# Patient Record
Sex: FEMALE | Race: OTHER
[De-identification: names, ages, dates, MRNs, and addresses within clinical notes are randomized per-mention and may not be internally consistent; named-entity substitution may affect disease eponyms.]

---

## 2017-03-10 ENCOUNTER — HOSPITAL ENCOUNTER (OUTPATIENT)
Dept: HOSPITAL 53 - M LAB | Age: 52
End: 2017-03-10
Attending: PHYSICIAN ASSISTANT
Payer: MEDICAID

## 2017-03-10 DIAGNOSIS — E11.9: Primary | ICD-10-CM

## 2017-03-10 DIAGNOSIS — J44.9: ICD-10-CM

## 2017-03-10 LAB
ALBUMIN SERPL BCG-MCNC: 3.6 GM/DL (ref 3.2–5.2)
ALBUMIN/GLOB SERPL: 1.03 {RATIO} (ref 1–1.93)
ALP SERPL-CCNC: 97 U/L (ref 45–117)
ALT SERPL W P-5'-P-CCNC: 24 U/L (ref 12–78)
ANION GAP SERPL CALC-SCNC: 8 MEQ/L (ref 8–16)
AST SERPL-CCNC: 14 U/L (ref 15–37)
BASOPHILS # BLD AUTO: 0.1 K/MM3 (ref 0–0.2)
BASOPHILS NFR BLD AUTO: 0.8 % (ref 0–1)
BILIRUB SERPL-MCNC: 0.3 MG/DL (ref 0.2–1)
BUN SERPL-MCNC: 10 MG/DL (ref 7–18)
CALCIUM SERPL-MCNC: 9.1 MG/DL (ref 8.5–10.1)
CHLORIDE SERPL-SCNC: 102 MEQ/L (ref 98–107)
CHOLEST SERPL-MCNC: 184 MG/DL (ref ?–200)
CO2 SERPL-SCNC: 31 MEQ/L (ref 21–32)
CREAT SERPL-MCNC: 0.8 MG/DL (ref 0.55–1.02)
EOSINOPHIL # BLD AUTO: 0.2 K/MM3 (ref 0–0.5)
EOSINOPHIL NFR BLD AUTO: 2.3 % (ref 0–3)
ERYTHROCYTE [DISTWIDTH] IN BLOOD BY AUTOMATED COUNT: 13.8 % (ref 11.5–14.5)
EST. AVERAGE GLUCOSE BLD GHB EST-MCNC: 169 MG/DL (ref 60–110)
GFR SERPL CREATININE-BSD FRML MDRD: > 60 ML/MIN/{1.73_M2} (ref 51–?)
GLUCOSE SERPL-MCNC: 175 MG/DL (ref 70–105)
LYMPHOCYTES # BLD AUTO: 1.7 K/MM3 (ref 1.5–4.5)
LYMPHOCYTES NFR BLD AUTO: 20 % (ref 24–44)
MCH RBC QN AUTO: 29.9 PG (ref 27–33)
MCHC RBC AUTO-ENTMCNC: 34.1 G/DL (ref 32–36.5)
MCV RBC AUTO: 87.5 FL (ref 80–96)
MONOCYTES # BLD AUTO: 0.3 K/MM3 (ref 0–0.8)
MONOCYTES NFR BLD AUTO: 3.6 % (ref 0–5)
NEUTROPHILS # BLD AUTO: 6.1 K/MM3 (ref 1.8–7.7)
NEUTROPHILS NFR BLD AUTO: 71.7 % (ref 36–66)
PLATELET # BLD AUTO: 276 K/MM3 (ref 150–450)
POTASSIUM SERPL-SCNC: 4.6 MEQ/L (ref 3.5–5.1)
PROT SERPL-MCNC: 7.1 GM/DL (ref 6.4–8.2)
SODIUM SERPL-SCNC: 141 MEQ/L (ref 136–145)
TRIGL SERPL-MCNC: 161 MG/DL (ref ?–150)
WBC # BLD AUTO: 8.6 K/MM3 (ref 4–10)

## 2017-03-10 NOTE — REP
Chest two views

 

HISTORY: COPD

 

Comparison: 01/28/2016

 

The lungs are hyperinflated.  The lungs are clear.  The cardiac silhouette is

enlarged.  The pulmonary vasculature is normal in appearance.  The bony structure

is intact.

 

IMPRESSION: Cardiomegaly.

 

 

Signed by

Ravi Greenwood MD 03/10/2017 09:57 A

## 2017-03-24 ENCOUNTER — HOSPITAL ENCOUNTER (OUTPATIENT)
Dept: HOSPITAL 53 - M RAD | Age: 52
End: 2017-03-24
Attending: PHYSICIAN ASSISTANT
Payer: MEDICAID

## 2017-03-24 DIAGNOSIS — Z12.31: Primary | ICD-10-CM

## 2017-03-24 NOTE — REPMRS
Patient History

The patient states she has not had a clinical breast exam in over

a year.

Patient is postmenopausal.

No known family history of cancer.

 

Digital Mammo Screening Bilat: March 24, 2017 - Exam #: 

[id number]

Bilateral CC and MLO view(s) were taken.

 

Technologist: Kerri Ramsey Technologist

Prior study comparison: January 28, 2016, digital woman screen 

mammo, performed at [facility].

 

FINDINGS: There are scattered fibroglandular densities.  There 

has been no change in the appearance of the mammogram from the 

prior studies.  There is a mild amount of residual fibroglandular

tissue which is fairly symmetric. There is no interval 

development of dominant mass, architectural distortion, or 

clustered microcalcification suggestive of malignancy.

 

ASSESSMENT: BI-RADS/ACR category 1 mammogram. Negative.

 

Recommendation

Routine screening mammogram in 1 year (for women over age 40).

This mammogram was interpreted with the aid of an FDA-approved 

computer-aided dectection system.

 

Electronically Signed By: Kahlil Black MD 03/24/17 [id number]

## 2017-08-23 ENCOUNTER — HOSPITAL ENCOUNTER (OUTPATIENT)
Dept: HOSPITAL 53 - M SLEEP | Age: 52
End: 2017-08-23
Attending: INTERNAL MEDICINE
Payer: COMMERCIAL

## 2017-08-23 DIAGNOSIS — G47.33: Primary | ICD-10-CM

## 2017-08-23 DIAGNOSIS — G47.61: ICD-10-CM

## 2017-08-26 NOTE — SLEEPCENT
DATE OF PROCEDURE:  08/23/2017

 

ORDERED BY:  Dr. Merino.

 

INTERPRETATION:  Nocturnal polysomnography was performed due to concern for the

obstructive sleep apnea syndrome in this patient with a history of nonrestorative

sleep and excessive somnolence.

 

8 hours and 5 minutes of data were reviewed. There were 328 minutes of sleep

identified.  Sleep latency was prolonged at 24 minutes.  REM latency was

prolonged at 365 minutes.  Sleep architecture was poor with significant

fragmentation and REM delay.  Overall sleep efficiency is 68.9%.  The patient's

EKG showed a sinus rhythm with an average heart rate of 82 beats per minute.

Rate variability was seen surrounding respiratory events.  Rate ranged .

EEG showed coarsening in background.  No focal events were seen.  There were 49

respiratory events identified of 10 seconds in duration or greater for an

apnea-hypopnea index of 9.  The events were primarily obstructive more frequent

in the supine posture not exclusive to sleep stage.  Arousal from respiratory

events occurred 7.9 times per hour.  Oxygen desaturations were seen into the low

80s.  There was also significant limb activity with four to five trains of 30

events.  Limb movement arousal index 13.2.

 

IMPRESSION:

1.  Obstructive sleep apnea syndrome (G47.31).  Apnea-hypopnea index of 9.

2.  Periodic limb movement disorder (G47.61).  Limb movement arousal index 13.2.

 

 

RECOMMENDATION:  The patient should be encouraged to return to the sleep disorder

center for pressure therapy.  In the interim, alcohol and sedative avoidance

should be practiced and caution exercised during the operation of motor vehicles.

Pending response to pressure therapy, interventions to reduce the frequency or

arousal from limb activity may also be helpful.

 

 

cc:    BEAU Rivera

## 2017-09-13 ENCOUNTER — HOSPITAL ENCOUNTER (OUTPATIENT)
Dept: HOSPITAL 53 - M SLEEP | Age: 52
End: 2017-09-13
Attending: INTERNAL MEDICINE
Payer: COMMERCIAL

## 2017-09-13 DIAGNOSIS — G47.33: Primary | ICD-10-CM

## 2017-09-14 NOTE — SLEEPCENT
DATE OF PROCEDURE:   09/13/2017

 

REQUESTING PROVIDER:  Ailin Ayers NP

 

INTERPRETATION: Nocturnal polysomnography was performed for titration of pressure

therapy in this patient with obstructive sleep apnea syndrome, apnea-hypopnea

index of 9.

 

For testing, a Resmed Quattro full face mask of medium size was used, 4 cm of

water pressure were applied to the circuit and the lights were extinguished.

 

6 hours and 45 minutes of data were reviewed.  There were 367 minutes of sleep

identified.  Sleep latency was short at 3.5 minutes.  Rapid eye movement (REM)

latency was short at 51 minutes. Sleep architecture was fairly good with two long

REM periods appreciated.  There was mild fragmentation at the end of the study.

Overall sleep efficiency was 91.8%.  The patient's EKG showed a sinus rhythm with

an average heart rate of 78 beats per minute.  Occasional unifocal ventricular

ectopic beats were seen.  EEG showed normal waveforms for awake and sleep.

Respiratory events were fully palliated with a CPAP to a pressure of +10.

Hypoventilatory oxygen desaturation prompted the addition of supplemental oxygen.

Best sleep was seen on CPAP +10 with 2 liters of oxygen bled through the system.

Remaining measures of sleep physiology were reasonably normal.

 

IMPRESSION:

1.  Obstructive sleep apnea syndrome (G47.33).

 

RECOMMENDATIONS:   Nightly use of pressure therapy at 10 cm of water with 2

liters of oxygen bled through the system to address hypoventilatory oxygen

desaturations.

## 2018-01-18 ENCOUNTER — HOSPITAL ENCOUNTER (OUTPATIENT)
Dept: HOSPITAL 53 - M LAB | Age: 53
End: 2018-01-18
Attending: NURSE PRACTITIONER
Payer: COMMERCIAL

## 2018-01-18 ENCOUNTER — HOSPITAL ENCOUNTER (OUTPATIENT)
Dept: HOSPITAL 53 - M LAB | Age: 53
End: 2018-01-18
Attending: PHYSICIAN ASSISTANT
Payer: COMMERCIAL

## 2018-01-18 DIAGNOSIS — Z79.899: ICD-10-CM

## 2018-01-18 DIAGNOSIS — Z51.81: Primary | ICD-10-CM

## 2018-01-18 DIAGNOSIS — E11.9: Primary | ICD-10-CM

## 2018-01-18 LAB
25(OH)D3 SERPL-MCNC: 35.8 NG/ML (ref 30–100)
ALBUMIN/GLOBULIN RATIO: 1.03 (ref 1–1.93)
ALBUMIN: 3.6 GM/DL (ref 3.2–5.2)
ALKALINE PHOSPHATASE: 104 U/L (ref 45–117)
ALT SERPL W P-5'-P-CCNC: 22 U/L (ref 12–78)
ANION GAP: 6 MEQ/L (ref 8–16)
AST SERPL-CCNC: 12 U/L (ref 7–37)
BASO #: 0.1 10^3/UL (ref 0–0.2)
BASO %: 1 % (ref 0–1)
BILIRUBIN,TOTAL: 0.5 MG/DL (ref 0.2–1)
BLOOD UREA NITROGEN: 10 MG/DL (ref 7–18)
CALCIUM LEVEL: 9.3 MG/DL (ref 8.5–10.1)
CARBON DIOXIDE LEVEL: 32 MEQ/L (ref 21–32)
CHLORIDE LEVEL: 99 MEQ/L (ref 98–107)
CHOLEST SERPL-MCNC: 159 MG/DL (ref ?–200)
CHOLEST SERPL-MCNC: 162 MG/DL (ref ?–200)
CHOLESTEROL RISK RATIO: 2.79 (ref ?–5)
CHOLESTEROL RISK RATIO: 2.79 (ref ?–5)
CREATININE FOR GFR: 0.7 MG/DL (ref 0.55–1.02)
EOS #: 0.2 10^3/UL (ref 0–0.5)
EOSINOPHIL NFR BLD AUTO: 1.6 % (ref 0–3)
EST. AVERAGE GLUCOSE BLD GHB EST-MCNC: 180 MG/DL (ref 60–110)
EST. AVERAGE GLUCOSE BLD GHB EST-MCNC: 192 MG/DL (ref 60–110)
GFR SERPL CREATININE-BSD FRML MDRD: > 60 ML/MIN/{1.73_M2} (ref 51–?)
GLUCOSE, FASTING: 198 MG/DL (ref 70–105)
GLUCOSE,RANDOM: 198 MG/DL (ref ?–200)
HDLC SERPL-MCNC: 57 MG/DL (ref 40–?)
HDLC SERPL-MCNC: 58 MG/DL (ref 40–?)
HEMATOCRIT: 52.2 % (ref 36–47)
HEMOGLOBIN: 18 G/DL (ref 12–16)
IMMATURE GRANULOCYTE #: 0.1 10^3/UL (ref 0–0)
IMMATURE GRANULOCYTE %: 0.6 % (ref 0–0)
LDL CHOLESTEROL: 78.2 MG/DL (ref ?–100)
LDL CHOLESTEROL: 79.8 MG/DL (ref ?–100)
LYMPH #: 1.8 10^3/UL (ref 1.5–4.5)
LYMPH %: 16.4 % (ref 24–44)
MEAN CORPUSCULAR HEMOGLOBIN: 29.7 PG (ref 27–33)
MEAN CORPUSCULAR HGB CONC: 34.5 G/DL (ref 32–36.5)
MEAN CORPUSCULAR VOLUME: 86.1 FL (ref 80–96)
MONO #: 0.6 10^3/UL (ref 0–0.8)
MONO %: 5.1 % (ref 0–5)
NEUTROPHILS #: 8.2 10^3/UL (ref 1.8–7.7)
NEUTROPHILS %: 75.3 % (ref 36–66)
NONHDLC SERPL-MCNC: 102 MG/DL
NONHDLC SERPL-MCNC: 104 MG/DL
NRBC BLD AUTO-RTO: 0 % (ref 0–0)
PLATELET COUNT, AUTOMATED: 269 10^3/UL (ref 150–450)
POTASSIUM SERUM: 4.7 MEQ/L (ref 3.5–5.1)
RED BLOOD COUNT: 6.06 10^6/UL (ref 4–5.4)
RED CELL DISTRIBUTION WIDTH: 13.7 % (ref 11.5–14.5)
SODIUM LEVEL: 137 MEQ/L (ref 136–145)
THYROID STIMULATING HORMONE: 2.23 UIU/ML (ref 0.36–3.74)
TOTAL PROTEIN: 7.1 GM/DL (ref 6.4–8.2)
TRIGLYCERIDES LEVEL: 119 MG/DL (ref ?–150)
TRIGLYCERIDES LEVEL: 121 MG/DL (ref ?–150)
WHITE BLOOD COUNT: 10.8 10^3/UL (ref 4–10)

## 2018-01-18 PROCEDURE — 82947 ASSAY GLUCOSE BLOOD QUANT: CPT

## 2018-01-18 PROCEDURE — 36415 COLL VENOUS BLD VENIPUNCTURE: CPT

## 2019-08-18 ENCOUNTER — HOSPITAL ENCOUNTER (EMERGENCY)
Dept: HOSPITAL 53 - M ED | Age: 54
Discharge: ANOTHER HOSPITAL | End: 2019-08-18
Payer: COMMERCIAL

## 2019-08-18 VITALS — BODY MASS INDEX: 50.02 KG/M2 | WEIGHT: 293 LBS | HEIGHT: 64 IN

## 2019-08-18 VITALS — DIASTOLIC BLOOD PRESSURE: 79 MMHG | SYSTOLIC BLOOD PRESSURE: 174 MMHG

## 2019-08-18 DIAGNOSIS — Z79.4: ICD-10-CM

## 2019-08-18 DIAGNOSIS — Z79.82: ICD-10-CM

## 2019-08-18 DIAGNOSIS — F20.9: ICD-10-CM

## 2019-08-18 DIAGNOSIS — E11.9: ICD-10-CM

## 2019-08-18 DIAGNOSIS — Z79.899: ICD-10-CM

## 2019-08-18 DIAGNOSIS — I63.9: Primary | ICD-10-CM

## 2019-08-18 DIAGNOSIS — I10: ICD-10-CM

## 2019-08-18 DIAGNOSIS — Z79.02: ICD-10-CM

## 2019-08-18 DIAGNOSIS — F17.210: ICD-10-CM

## 2019-08-18 DIAGNOSIS — F41.9: ICD-10-CM

## 2019-08-18 LAB
APTT BLD: 28.8 SECONDS (ref 25–38.4)
BASOPHILS # BLD AUTO: 0.1 10^3/UL (ref 0–0.2)
BASOPHILS NFR BLD AUTO: 0.9 % (ref 0–1)
CK MB CFR.DF SERPL CALC: 2.27
CK MB SERPL-MCNC: < 1 NG/ML (ref ?–3.6)
CK SERPL-CCNC: 44 U/L (ref 26–192)
EOSINOPHIL # BLD AUTO: 0.2 10^3/UL (ref 0–0.5)
EOSINOPHIL NFR BLD AUTO: 1.8 % (ref 0–3)
HCT VFR BLD AUTO: 46.9 % (ref 36–47)
HGB BLD-MCNC: 16.1 G/DL (ref 12–15.5)
INR PPP: 0.98
LYMPHOCYTES # BLD AUTO: 2.1 10^3/UL (ref 1.5–4.5)
LYMPHOCYTES NFR BLD AUTO: 21.1 % (ref 24–44)
MCH RBC QN AUTO: 30.3 PG (ref 27–33)
MCHC RBC AUTO-ENTMCNC: 34.3 G/DL (ref 32–36.5)
MCV RBC AUTO: 88.3 FL (ref 80–96)
MONOCYTES # BLD AUTO: 0.5 10^3/UL (ref 0–0.8)
MONOCYTES NFR BLD AUTO: 5.4 % (ref 0–5)
NEUTROPHILS # BLD AUTO: 6.8 10^3/UL (ref 1.8–7.7)
NEUTROPHILS NFR BLD AUTO: 70.3 % (ref 36–66)
PLATELET # BLD AUTO: 277 10^3/UL (ref 150–450)
PROTHROMBIN TIME: 12.7 SECONDS (ref 11.8–14)
RBC # BLD AUTO: 5.31 10^6/UL (ref 4–5.4)
TROPONIN I SERPL-MCNC: < 0.02 NG/ML (ref ?–0.1)
WBC # BLD AUTO: 9.7 10^3/UL (ref 4–10)

## 2019-08-18 NOTE — REPVR
EXAM: 

CT Head Without Contrast 



EXAM DATE/TIME: 

8/18/2019 9:44 PM 



CLINICAL HISTORY: 

54 years old, female; Weakness, extremity; Left; Additional info: Left-sided 

numbness 



TECHNIQUE: 

Imaging protocol: Computed tomography images of the head without contrast. 

Radiation optimization: All CT scans at this facility use at least one of these 

dose optimization techniques: automated exposure control; mA and/or kV 

adjustment per patient size (includes targeted exams where dose is matched to 

clinical indication); or iterative reconstruction. 

Other technique: STROKE PROTOCOL was implemented. 



COMPARISON: 

No relevant prior studies available. 



FINDINGS: 

Brain: There are multiple small round areas of low density in the 

periventricular white matter especially along the lateral ventricles greater on 

the right and consistent with chronic ischemic changes. 

Ventricles: Normal. No ventriculomegaly. 

Bones/joints: There is no evidence of fracture. 

Sinuses: Clear paranasal sinuses. 

Mastoid air cells: Clear mastoid air cells there is no evidence of intracranial 

bleed. 

Soft tissues: Unremarkable. 

Other findings: There is no evidence of acute stroke, however, a stroke of less 

than 24 hours might not be seen on CT. MRI is more sensitive for very early 

stroke. 



IMPRESSION: 

1. Small areas of old ischemic change in white matter along the ventricles. 

2. No evidence of acute stroke, however, MRI is more sensitive for this. 



ASSESSMENT: 

ASPECTS (Alberta Stroke Program Early CT Score) is 10.



Electronically signed by: Kvng Bentley On 08/18/2019  22:06:20 PM

## 2019-08-19 NOTE — REP
Portable chest, 10:56 p.m., single AP view with the patient semi upright:

Comparisons are the PA and lateral chest dated 01/28/2016 and CT of the abdomen

day 05/23/2009.

 

There is focal increased density inferomedially in the right lung, unchanged.  On

the comparison CT there is a large epicardial fat pad likely accounting for this

density.

Lung fields otherwise clear.

Cardiac size is mildly enlarged.  The sonya, mediastinum, and skeletal structures

are unremarkable.

Mild cardiomegaly.  Increased density inferomedially on the right, likely

artifact from a large epicardial fat pad.  The

 

 

Electronically Signed by

Kahlil Vargas MD 08/19/2019 07:11 A

## 2019-08-20 NOTE — ECGEPIP
[facility] - ED

                                       

                                       Test Date:    2019

Pat Name:     JOCELYN EPPS           Department:   

Patient ID:   [id number]                 Room:         -

Gender:       Female                   Technician:   AK

:          1965               Requested By: FRANNIE GOEL

Order Number: [id number]     Reading MD:   Viki Thompson

                                 Measurements

Intervals                              Axis          

Rate:         78                       P:            58

NM:           170                      QRS:          40

QRSD:         90                       T:            75

QT:           383                                    

QTc:          437                                    

                           Interpretive Statements

SINUS RHYTHM WITH OCCASIONAL SUPRAVENTRICULAR PREMATURE COMPLEXES

NSTTW abnormalities

NO PRIOR

Electronically Signed on 2019 10:12:13 EDT by Viki Thompson

## 2019-08-22 ENCOUNTER — HOSPITAL ENCOUNTER (INPATIENT)
Dept: HOSPITAL 53 - M PM&R | Age: 54
LOS: 34 days | Discharge: HOME HEALTH | DRG: 58 | End: 2019-09-25
Attending: PHYSICAL MEDICINE & REHABILITATION | Admitting: PHYSICAL MEDICINE & REHABILITATION
Payer: COMMERCIAL

## 2019-08-22 VITALS — DIASTOLIC BLOOD PRESSURE: 73 MMHG | SYSTOLIC BLOOD PRESSURE: 158 MMHG

## 2019-08-22 VITALS — HEIGHT: 62 IN | WEIGHT: 287.92 LBS | BODY MASS INDEX: 52.98 KG/M2

## 2019-08-22 DIAGNOSIS — Z87.891: ICD-10-CM

## 2019-08-22 DIAGNOSIS — Z90.49: ICD-10-CM

## 2019-08-22 DIAGNOSIS — E11.9: ICD-10-CM

## 2019-08-22 DIAGNOSIS — I10: ICD-10-CM

## 2019-08-22 DIAGNOSIS — G47.33: ICD-10-CM

## 2019-08-22 DIAGNOSIS — F17.200: ICD-10-CM

## 2019-08-22 DIAGNOSIS — E78.5: ICD-10-CM

## 2019-08-22 DIAGNOSIS — E66.01: ICD-10-CM

## 2019-08-22 DIAGNOSIS — I69.392: ICD-10-CM

## 2019-08-22 DIAGNOSIS — I69.354: ICD-10-CM

## 2019-08-22 DIAGNOSIS — R13.10: ICD-10-CM

## 2019-08-22 DIAGNOSIS — Z79.4: ICD-10-CM

## 2019-08-22 DIAGNOSIS — F20.9: ICD-10-CM

## 2019-08-22 DIAGNOSIS — I69.398: ICD-10-CM

## 2019-08-22 DIAGNOSIS — N39.0: ICD-10-CM

## 2019-08-22 DIAGNOSIS — Z79.899: ICD-10-CM

## 2019-08-22 DIAGNOSIS — Z79.82: ICD-10-CM

## 2019-08-22 DIAGNOSIS — I69.391: Primary | ICD-10-CM

## 2019-08-22 DIAGNOSIS — R26.89: ICD-10-CM

## 2019-08-22 RX ADMIN — SENNOSIDES SCH TAB: 8.6 TABLET, FILM COATED ORAL at 22:11

## 2019-08-22 RX ADMIN — DOCUSATE SODIUM SCH MG: 100 CAPSULE, LIQUID FILLED ORAL at 22:11

## 2019-08-22 RX ADMIN — INSULIN LISPRO SCH UNITS: 100 INJECTION, SOLUTION INTRAVENOUS; SUBCUTANEOUS at 21:00

## 2019-08-22 RX ADMIN — LATANOPROST SCH DROP: 50 SOLUTION OPHTHALMIC at 21:00

## 2019-08-22 RX ADMIN — FLUTICASONE PROPIONATE SCH SPRAY: 50 SPRAY, METERED NASAL at 21:00

## 2019-08-23 VITALS — SYSTOLIC BLOOD PRESSURE: 125 MMHG | DIASTOLIC BLOOD PRESSURE: 68 MMHG

## 2019-08-23 VITALS — SYSTOLIC BLOOD PRESSURE: 123 MMHG | DIASTOLIC BLOOD PRESSURE: 72 MMHG

## 2019-08-23 VITALS — SYSTOLIC BLOOD PRESSURE: 126 MMHG | DIASTOLIC BLOOD PRESSURE: 71 MMHG

## 2019-08-23 LAB
ALBUMIN SERPL BCG-MCNC: 3.3 GM/DL (ref 3.2–5.2)
ALT SERPL W P-5'-P-CCNC: 23 U/L (ref 12–78)
BASOPHILS # BLD AUTO: 0.1 10^3/UL (ref 0–0.2)
BASOPHILS NFR BLD AUTO: 0.8 % (ref 0–1)
BILIRUB SERPL-MCNC: 0.8 MG/DL (ref 0.2–1)
BUN SERPL-MCNC: 18 MG/DL (ref 7–18)
CALCIUM SERPL-MCNC: 9.8 MG/DL (ref 8.5–10.1)
CHLORIDE SERPL-SCNC: 101 MEQ/L (ref 98–107)
CO2 SERPL-SCNC: 31 MEQ/L (ref 21–32)
CREAT SERPL-MCNC: 0.81 MG/DL (ref 0.55–1.3)
EOSINOPHIL # BLD AUTO: 0.1 10^3/UL (ref 0–0.5)
EOSINOPHIL NFR BLD AUTO: 1.6 % (ref 0–3)
GFR SERPL CREATININE-BSD FRML MDRD: > 60 ML/MIN/{1.73_M2} (ref 51–?)
GLUCOSE SERPL-MCNC: 145 MG/DL (ref 70–100)
HCT VFR BLD AUTO: 49.6 % (ref 36–47)
HGB BLD-MCNC: 16.7 G/DL (ref 12–15.5)
LYMPHOCYTES # BLD AUTO: 1.6 10^3/UL (ref 1.5–4.5)
LYMPHOCYTES NFR BLD AUTO: 18.2 % (ref 24–44)
MCH RBC QN AUTO: 28.9 PG (ref 27–33)
MCHC RBC AUTO-ENTMCNC: 33.7 G/DL (ref 32–36.5)
MCV RBC AUTO: 85.8 FL (ref 80–96)
MONOCYTES # BLD AUTO: 0.7 10^3/UL (ref 0–0.8)
MONOCYTES NFR BLD AUTO: 7.7 % (ref 0–5)
NEUTROPHILS # BLD AUTO: 6.2 10^3/UL (ref 1.8–7.7)
NEUTROPHILS NFR BLD AUTO: 71.2 % (ref 36–66)
PLATELET # BLD AUTO: 261 10^3/UL (ref 150–450)
POTASSIUM SERPL-SCNC: 3.6 MEQ/L (ref 3.5–5.1)
PROT SERPL-MCNC: 7.4 GM/DL (ref 6.4–8.2)
RBC # BLD AUTO: 5.78 10^6/UL (ref 4–5.4)
SODIUM SERPL-SCNC: 137 MEQ/L (ref 136–145)
WBC # BLD AUTO: 8.7 10^3/UL (ref 4–10)

## 2019-08-23 RX ADMIN — FLUTICASONE PROPIONATE SCH SPRAY: 50 SPRAY, METERED NASAL at 22:43

## 2019-08-23 RX ADMIN — CLOPIDOGREL BISULFATE SCH MG: 75 TABLET ORAL at 08:50

## 2019-08-23 RX ADMIN — ASPIRIN 81 MG CHEWABLE TABLET SCH MG: 81 TABLET CHEWABLE at 08:50

## 2019-08-23 RX ADMIN — SENNOSIDES SCH TAB: 8.6 TABLET, FILM COATED ORAL at 22:41

## 2019-08-23 RX ADMIN — INSULIN LISPRO SCH UNITS: 100 INJECTION, SOLUTION INTRAVENOUS; SUBCUTANEOUS at 22:42

## 2019-08-23 RX ADMIN — LATANOPROST SCH DROP: 50 SOLUTION OPHTHALMIC at 22:42

## 2019-08-23 RX ADMIN — ACETAMINOPHEN PRN MG: 325 TABLET ORAL at 02:53

## 2019-08-23 RX ADMIN — ENOXAPARIN SODIUM SCH MG: 40 INJECTION SUBCUTANEOUS at 08:49

## 2019-08-23 RX ADMIN — LISINOPRIL SCH MG: 10 TABLET ORAL at 22:42

## 2019-08-23 RX ADMIN — IPRATROPIUM BROMIDE AND ALBUTEROL SULFATE SCH ML: .5; 3 SOLUTION RESPIRATORY (INHALATION) at 19:37

## 2019-08-23 RX ADMIN — PANTOPRAZOLE SODIUM SCH MG: 40 TABLET, DELAYED RELEASE ORAL at 08:50

## 2019-08-23 RX ADMIN — DOCUSATE SODIUM SCH MG: 100 CAPSULE, LIQUID FILLED ORAL at 22:41

## 2019-08-23 RX ADMIN — ATORVASTATIN CALCIUM SCH MG: 20 TABLET, FILM COATED ORAL at 08:50

## 2019-08-23 RX ADMIN — FLUTICASONE PROPIONATE SCH SPRAY: 50 SPRAY, METERED NASAL at 08:50

## 2019-08-23 RX ADMIN — DOCUSATE SODIUM SCH MG: 100 CAPSULE, LIQUID FILLED ORAL at 08:50

## 2019-08-23 RX ADMIN — IPRATROPIUM BROMIDE AND ALBUTEROL SULFATE SCH ML: .5; 3 SOLUTION RESPIRATORY (INHALATION) at 12:00

## 2019-08-23 RX ADMIN — INSULIN LISPRO SCH UNITS: 100 INJECTION, SOLUTION INTRAVENOUS; SUBCUTANEOUS at 12:30

## 2019-08-23 RX ADMIN — INSULIN LISPRO SCH UNITS: 100 INJECTION, SOLUTION INTRAVENOUS; SUBCUTANEOUS at 17:31

## 2019-08-23 RX ADMIN — BUPROPION HYDROCHLORIDE SCH MG: 150 TABLET, FILM COATED, EXTENDED RELEASE ORAL at 08:50

## 2019-08-23 RX ADMIN — INSULIN LISPRO SCH UNITS: 100 INJECTION, SOLUTION INTRAVENOUS; SUBCUTANEOUS at 08:49

## 2019-08-23 NOTE — HPEPDOC
Physiatrist Note


DATE OF ADMISSION: 8/22/19





SOURCE OF ADMISSION INFORMATION: [facility] records and patient





CHIEF COMPLAINT: stroke





HISTORY OF PRESENT ILLNESS: 





54F pm DM, HTN, HLD, schizophrenia,  morbid obesity, smoker, GIULIANO, recent CVA 

with unclear residual deficits, who developed left sided facial droop and left 

upper extremity weakness initially presented to [facility] ED and transferred to [facility] where she was admitted to the stroke service on 8/19/19. MRI on 8/20/19 

showed, Acute infarcts bilateral corona radiata and basal ganglia. 2. Right 

retinal detachment.  CTA head and neck showed calcified plaques in both ICA 

without significant stenosis. ECHO was negative for a LA appendage thrombus. She

was started on dual antiplatelet therapy, evaluated by therapy and noted have 

severe impairments in mobility and ADL management in addition to dysphagia. She 

was deemed medically appropriate for discharge to ARU on 8/22/19. 








REVIEW OF SYSTEMS: The following is a completed review of systems and has been 

reviewed. Review of systems otherwise unremarkable.


PAIN: Patient self reports no pain


EYES: No recent vision changes


EARS, NOSE, & THROAT: +dysphagia


CARDIOVASCULAR: Denies chest pain or palpitations


PULMONARY: Denies shortness of breath, except with exertion


GASTROINTESTINAL: Denies constipation/diarrhea


GENITOURINARY: denies dysuria


MUSCULOSKELETAL: LUE weakness and LLE weakness


NEUROLOGICAL: left sided paresis, facial droop


SKIN: no rash


PSYCHIATRIC: +schizophrenia


All other review of systems found to be negative.





PAST MEDICAL HISTORY:


as per HPI





PAST SURGICAL HISTORY:


appendectomy, cardiac cath, eye surgery





ALLERGIES: Please see below.





MEDICATIONS: Please see below.





FAMILY HISTORY:  heart disease





SOCIAL HISTORY: quit smoking a couple weeks ago, 1ppd x 33 years, 





DIET: level 2 and nectar





PHYSICAL EXAMINATION:


VITAL SIGNS: Please see below.


GENERAL: Pleasant and cooperative. No acute distress. obese


HEENT: PERRL. Extraocular movements intact. Clear conjunctiva, left sided facial

droop


CARDIOVASCULAR: Regular rate and rhythm. No murmurs, rubs, or gallops


LUNGS: decreased breath sounds, no wheezes appreciated


ABDOMEN: Soft, nontender, nondistended. Positive bowel sounds. Normal active 

bowel sounds


NEUROLOGICAL: Alert and oriented times three. Cranial nerves II through XII 

grossly intact except left facial droop. Sensation grossly intact  to light 

touch all 4 extremities


equivocal babinksi bilat, no clonus


EXTREMITIES: 5\5 strength right upper extremities. flaccid LUE, 5\5 strength 

right lower extremity. 4/5 strength in left lower extremity. 





SKIN:keratotic 





LABORATORY DATA: Please see below.





IMAGING:Imaging documentation personally reviewed by record





FUNCTIONAL STATUS: 





Premorbid:  Mod-Independent with all activities of daily life as well as 

mobility household distances with RW





On Admission: 


Maximum assistance for bathing, upper body dressing, bed chair and wheelchair 

transfers, toilet transfers, ambulation.








GOALS: Mod-I with RW household distances, supervision bathing, Mod-I toileting, 

dressing, grooming, advance diet, medical optimization, caregiver training, 

assess for DME needs





ASSESSMENT:54-year-old F with past medical history of obesity, HTN, who presents

status post stroke.





PLAN:


1. Rehab: PT- strengthen/stretch/maintain ROM bilat LE, improve gait and 

endurance, family training


OT-strengthen/stretch/maintain ROM bilat UE, improve trunk control and ADL 

management


SLP- significant dysphagia, c/u puree diet with nectar (downgraded from level 2 

per inhouse SLP recs-appreciated)


2. Neuro: recent CVA with new onset stroke in the bilateral basal ganglia and 

corona radiata- c/u ASA and Plavix, c/u statin, f/u with [facility] stroke clinic


-proza  q for motor recovery


3. Cardio: pmh HTN, c/u lisinopril and HCTZ- medicine consulted to assist in 

management


4. Resp: heavy smoker, c/u Duonebs, GIULIANO c/u CPAP, monitor pulse-ox and for 

infection


5. Psych- pmh schizophrenia c/u Wellbutryn


6. DVT ppx: lovenox and TEDs


7. GI ppx- protonix


8. : monitor PVRs


9. Dispo: tbd








POST ADMISSION PHYSICIAN EVALUATION: 


Medical and functional status: Description of medical status, medical 

assessment: As above. Rehabilitation diagnosis and current and prior cold morbid

medical conditions as above. Risk of complications and plans to mitigate them as

above. Description of functional status current status is as above. Prior status

as above.


Status compared to preadmission: There are no clinically significant differences

between the patient's current status and the information described on the 

preadmission screening document.


Treatment plan anticipated: Treatment plan is as described above. Required 

disciplines including physical therapy, occupational therapy, others as noted 

above.


Intensity of services: 3 hours a day, 6 days a week. 


Special considerations: There are no specific special or safety considerations 

that would likely preclude immediate implementation of an intensive 

rehabilitation program or subsequently influence the plan of care.





ATTESTATION: Considering all the information above, it is my best judgment that 

this patient requires intensive rehabilitation therapy as described above and an

inpatient hospital environment due to the complexity of nursing, medical, and 

rehabilitation needs required by the patient. Furthermore, this patient can 

reasonably be expected to participate in an benefit from an inpatient 

rehabilitation stay with an interdisciplinary team approach to the delivery of 

rehabilitation care under the direction and supervision of rehabilitation 

physician





PROGNOSIS: good





ESTIMATED LENGTH OF STAY:24-28 days.





PROJECTED DISCHARGE DESTINATION: Home with family support and any durable 

medical equipment required to increase functional safety and mobility





TIME SPENT COUNSELING AND COORDINATING INITIAL CARE: Greater than 70 minutes.


Vital Signs





Vital Sign - Last 24 Hours








 8/22/19 8/23/19





 19:40 06:00


 


Temp 97.1 97.1


 


Pulse 99 68


 


Resp 18 18


 


B/P (MAP) 158/73 (101) 123/72 (89)


 


Pulse Ox 91 95











Laboratory Data


CBC/BMP


Laboratory Tests


8/23/19 06:58








Red Blood Count 5.78 H, Mean Corpuscular Volume 85.8, Mean Corpuscular 

Hemoglobin 28.9, Mean Corpuscular Hemoglobin Concent 33.7, Red Cell Distribution

Width 13.9, Neutrophils (%) (Auto) 71.2 H, Lymphocytes (%) (Auto) 18.2 L, 

Monocytes (%) (Auto) 7.7 H, Eosinophils (%) (Auto) 1.6, Basophils (%) (Auto) 

0.8, Neutrophils # (Auto) 6.2, Lymphocytes # (Auto) 1.6, Monocytes # (Auto) 0.7,

Eosinophils # (Auto) 0.1, Basophils # (Auto) 0.1, Calcium Level 9.8, Aspartate 

Amino Transf (AST/SGOT) 12, Alanine Aminotransferase (ALT/SGPT) 23, Alkaline 

Phosphatase 77, Total Bilirubin 0.8, Total Protein 7.4, Albumin 3.3


Labs 24H


Laboratory Tests 2


8/22/19 20:58: Bedside Glucose (Misc Panel) 168H


8/23/19 06:45: Bedside Glucose (Misc Panel) 159H


8/23/19 06:58: 


Immature Granulocyte % (Auto) 0.5, White Blood Count 8.7, Red Blood Count 5.78H,

Hemoglobin 16.7H, Hematocrit 49.6H, Mean Corpuscular Volume 85.8, Mean 

Corpuscular Hemoglobin 28.9, Mean Corpuscular Hemoglobin Concent 33.7, Red Cell 

Distribution Width 13.9, Platelet Count 261, Neutrophils (%) (Auto) 71.2H, 

Lymphocytes (%) (Auto) 18.2L, Monocytes (%) (Auto) 7.7H, Eosinophils (%) (Auto) 

1.6, Basophils (%) (Auto) 0.8, Neutrophils # (Auto) 6.2, Lymphocytes # (Auto) 

1.6, Monocytes # (Auto) 0.7, Eosinophils # (Auto) 0.1, Basophils # (Auto) 0.1, 

Nucleated Red Blood Cells % (auto) 0.0, Anion Gap 5L, Glomerular Filtration Rate

> 60.0, Blood Urea Nitrogen 18, Creatinine 0.81, Sodium Level 137, Potassium 

Level 3.6, Chloride Level 101, Carbon Dioxide Level 31, Calcium Level 9.8, 

Aspartate Amino Transf (AST/SGOT) 12, Alanine Aminotransferase (ALT/SGPT) 23, 

Alkaline Phosphatase 77, Total Bilirubin 0.8, Total Protein 7.4, Albumin 3.3, 

Albumin/Globulin Ratio 0.80L


8/23/19 11:50: Bedside Glucose (Misc Panel) 161H


FSBS





Laboratory Tests








Test


 8/22/19


20:58 8/23/19


06:45 8/23/19


11:50 Range/Units


 


 


Bedside Glucose (Misc Panel) 168 159 161   MG/DL











Home Medications


Scheduled


Aspirin (Aspirin) 81 Mg Tab.chew, 81 MG PO DAILY, (Reported)


Atorvastatin Calcium (Atorvastatin Calcium) 80 Mg Tablet, 80 MG PO DAILY, 

(Reported)


Bupropion Hcl (Bupropion Xl) 150 Mg Tab, 150 MG PO QAM, (Reported)


Clopidogrel Bisulfate (Clopidogrel) 75 Mg Tablet, 75 MG PO DAILY, (Reported)


Docusate Sodium (Docusate Sodium) 100 Mg Capsule, 100 MG PO BID, (Reported)


Fluticasone Propionate (Flovent Hfa) 44 Mcg/Act Aer.w.adap, 2 PUFF INH BID, 

(Reported)


Hydrochlorothiazide (Hydrochlorothiazide) 25 Mg Tablet, 25 MG PO DAILY, 

(Reported)


Insulin Human Lispro (Humalog) 100 Unit/1 Ml Vial, 1 DOSE SC AC, (Reported)


   per sliding scale 


Ipratropium/Albuterol Sulfate (Iprat-Albut 0.5-3(2.5) mg/3 ml) 3 Ml Ampul.neb, 1

VIAL NEB Q8H, (Reported)


Latanoprost (Xalatan) 0.005% 2.5ML Drops, 1 DROP OU QPM, (Reported)


Lisinopril (Lisinopril) 10 Mg Tablet, 10 MG PO DAILY, (Reported)





Allergies


Coded Allergies:  


     No Known Allergies (Verified , 8/26/04)





A-FIB/CHADSVASC


A-FIB History


Current/History of A-Fib/PAF?:  No





Age/Risk Factor Scoring


CHADSVASC:  








CHADSVASC Response (Comments) Value


 


Age Risk Factor Age < 65 years old 0


 


Gender Risk Factor Female 1


 


Hx of CHF No 0


 


Hx of HTN Yes 1


 


Hx of Stroke/TIA/or VTE Yes 2


 


Hx of Diabetes Yes 1


 


Hx of Vascular Disease No 0


 


Total  5

















JASWANT ALFARO MD         Aug 23, 2019 14:59

## 2019-08-23 NOTE — REP
PA and lateral chest:

Comparison is 08/18/2019.

 

There is cardiomegaly, unchanged.

There are bilateral epicardial fat pads, unchanged.

Lung fields otherwise clear.  The sonya, mediastinum, skeletal structures are

unremarkable.

Impression:

There is chronic mild cardiomegaly, unchanged from the comparison study and also

unchanged from 01/01/2014.

Lung fields are clear.

There are bilateral epicardial fat pads.

There are no acute cardiopulmonary findings.

 

 

Electronically Signed by

Kahlil Vargas MD 08/23/2019 05:05 P

## 2019-08-23 NOTE — HPEPDOC
[facility] Medical History & Physical


Date of Admission


Aug 22, 2019


Date of Service:  Aug 22, 2019





History and Physical


CHIEF COMPLAINT: [left sided hemiparesis ]





HISTORY OF PRESENT ILLNESS: [This is a 54b yo female with pmhx of right sided 

cva dm2, htn, hld, morbid obesity, retinal detachment and right eye blindness 

who was transferred from [facility] to rehab for acute rehab for recent left sided 

cva. She denied any symptoms other than left arm weakness. She is cooperative 

and answers questions appropriately. She denied fever,chills, chest pain, sob, 

headache , new vision changes, dysuria or cough.   ]





PAST MEDICAL HISTORY:


right sided cva dm2, htn, hld, morbid obesity, retinal detachment and right eye 

blindness





PAST SURGICAL HISTORY:


tubal ligation , 


b/l cataract 





SOCIAL HISTORY:


quit smoking few weeks ago - smoked 1ppd for 33 yrs 


denied etoh use, or drug use


lives with son and his wife and her mother 





FAMILY HISTORY:


brother  of HF at 40s 





ALLERGIES: Please see below.





HOME MEDICATIONS: Please see below. 





ROS  - all 10 point review of system is negative except for whats listed in HPI







Physical exam 


Gen: NAD, healthy appearing , 


HEENT: normocephalic, atraumatic, no discharge from ears or nose, no 

oropharyngeal erythema or exudate, neck is supple, no lymphadenopathy, trachea 

midline 


CVS: RRR, normal S1n S2, no murmur, rubs, or gallops, no edema, no jvd 


Resp: LCTAB, no rhonchi, wheezes or crackles 


Abd : soft nontender, normal bowel sounds, no rebound tenderness or guarding 


MSK: no swelling, full range of motion, left arm 0/5 strength, but sensation 

intact, left facial droop but forehead and eyes are preserved 


Neuro: AOAx3, no confusion, left arm paralysis 


Psych: normal mood and affect, good judgment








LABORATORY DATA: See below.





IMAGING: [see chart ]





MICROBIOLOGY: Please see below. 





ASSESSMENT and plan 


left sided cva 


per endorsement - patient has b/l basal ganlia stroke 


aspirin 


statin 


c/w acute rehab //pt 


on plavix as well





dm2 , chronic 


hypoglycemic protocol 


on iss 





htn, chronic stable  


c/w lisinopril and hctz 





dvt ppx - lovenox 





full code





Vital Signs





Vital Signs








  Date Time  Temp Pulse Resp B/P (MAP) Pulse Ox O2 Delivery O2 Flow Rate FiO2


 


19 06:00 97.1 68 18 123/72 (89) 95   











Laboratory Data


Labs 24H


Laboratory Tests 2


19 20:58: Bedside Glucose (Misc Panel) 168H


19 06:45: Bedside Glucose (Misc Panel) 159H





Home Medications


Scheduled


Aspirin (Aspirin) 81 Mg Tab.chew, 81 MG PO DAILY


Atorvastatin Calcium (Atorvastatin Calcium) 80 Mg Tablet, 80 MG PO DAILY


Bupropion Hcl (Bupropion Xl) 150 Mg Tab, 150 MG PO QAM


Clopidogrel Bisulfate (Clopidogrel) 75 Mg Tablet, 75 MG PO DAILY


Docusate Sodium (Docusate Sodium) 100 Mg Capsule, 100 MG PO BID


Fluticasone Propionate (Flovent Hfa) 44 Mcg/Act Aer.w.adap, 2 PUFF INH BID


Hydrochlorothiazide (Hydrochlorothiazide) 25 Mg Tablet, 25 MG PO DAILY


Insulin Human Lispro (Humalog) 100 Unit/1 Ml Vial, 1 DOSE SC AC


   per sliding scale 


Ipratropium/Albuterol Sulfate (Iprat-Albut 0.5-3(2.5) mg/3 ml) 3 Ml Ampul.neb, 1

VIAL NEB Q8H


Latanoprost (Xalatan) 0.005% 2.5ML Drops, 1 DROP OU QPM


Lisinopril (Lisinopril) 10 Mg Tablet, 10 MG PO DAILY





Allergies


Coded Allergies:  


     No Known Allergies (Verified , 04)





A-FIB/CHADSVASC


A-FIB History


Current/History of A-Fib/PAF?:  No


Current PO Anticoag Therapy:  No





Age/Risk Factor Scoring


CHADSVASC:  








CHADSVASC Response (Comments) Value


 


Age Risk Factor Age < 65 years old 0


 


Gender Risk Factor Female 1


 


Hx of CHF No 0


 


Hx of HTN Yes 1


 


Hx of Stroke/TIA/or VTE Yes 2


 


Hx of Diabetes Yes 1


 


Hx of Vascular Disease No 0


 


Total  5











Treatment


Treatment ordered:  NONE


Reason Anticoagulant not given:  Not indicated/[id number]











KIMBERLI MADSEN MD               Aug 23, 2019 07:33

## 2019-08-24 VITALS — DIASTOLIC BLOOD PRESSURE: 66 MMHG | SYSTOLIC BLOOD PRESSURE: 126 MMHG

## 2019-08-24 VITALS — DIASTOLIC BLOOD PRESSURE: 58 MMHG | SYSTOLIC BLOOD PRESSURE: 118 MMHG

## 2019-08-24 VITALS — SYSTOLIC BLOOD PRESSURE: 112 MMHG | DIASTOLIC BLOOD PRESSURE: 56 MMHG

## 2019-08-24 RX ADMIN — ATORVASTATIN CALCIUM SCH MG: 20 TABLET, FILM COATED ORAL at 09:53

## 2019-08-24 RX ADMIN — ACETAMINOPHEN PRN MG: 325 TABLET ORAL at 09:54

## 2019-08-24 RX ADMIN — CLOPIDOGREL BISULFATE SCH MG: 75 TABLET ORAL at 09:53

## 2019-08-24 RX ADMIN — INSULIN LISPRO SCH UNITS: 100 INJECTION, SOLUTION INTRAVENOUS; SUBCUTANEOUS at 17:30

## 2019-08-24 RX ADMIN — FLUTICASONE PROPIONATE SCH SPRAY: 50 SPRAY, METERED NASAL at 09:52

## 2019-08-24 RX ADMIN — IPRATROPIUM BROMIDE AND ALBUTEROL SULFATE SCH ML: .5; 3 SOLUTION RESPIRATORY (INHALATION) at 19:51

## 2019-08-24 RX ADMIN — LISINOPRIL SCH MG: 10 TABLET ORAL at 21:51

## 2019-08-24 RX ADMIN — BUPROPION HYDROCHLORIDE SCH MG: 150 TABLET, FILM COATED, EXTENDED RELEASE ORAL at 09:52

## 2019-08-24 RX ADMIN — LATANOPROST SCH DROP: 50 SOLUTION OPHTHALMIC at 21:52

## 2019-08-24 RX ADMIN — PANTOPRAZOLE SODIUM SCH MG: 40 TABLET, DELAYED RELEASE ORAL at 09:53

## 2019-08-24 RX ADMIN — FLUTICASONE PROPIONATE SCH SPRAY: 50 SPRAY, METERED NASAL at 21:52

## 2019-08-24 RX ADMIN — ASPIRIN 81 MG CHEWABLE TABLET SCH MG: 81 TABLET CHEWABLE at 09:53

## 2019-08-24 RX ADMIN — IPRATROPIUM BROMIDE AND ALBUTEROL SULFATE SCH ML: .5; 3 SOLUTION RESPIRATORY (INHALATION) at 07:11

## 2019-08-24 RX ADMIN — INSULIN LISPRO SCH UNITS: 100 INJECTION, SOLUTION INTRAVENOUS; SUBCUTANEOUS at 12:46

## 2019-08-24 RX ADMIN — DOCUSATE SODIUM SCH MG: 100 CAPSULE, LIQUID FILLED ORAL at 21:00

## 2019-08-24 RX ADMIN — DOCUSATE SODIUM SCH MG: 100 CAPSULE, LIQUID FILLED ORAL at 09:52

## 2019-08-24 RX ADMIN — INSULIN LISPRO SCH UNITS: 100 INJECTION, SOLUTION INTRAVENOUS; SUBCUTANEOUS at 21:00

## 2019-08-24 RX ADMIN — INSULIN LISPRO SCH UNITS: 100 INJECTION, SOLUTION INTRAVENOUS; SUBCUTANEOUS at 07:30

## 2019-08-24 RX ADMIN — SENNOSIDES SCH TAB: 8.6 TABLET, FILM COATED ORAL at 21:52

## 2019-08-24 RX ADMIN — ENOXAPARIN SODIUM SCH MG: 40 INJECTION SUBCUTANEOUS at 09:52

## 2019-08-25 VITALS — DIASTOLIC BLOOD PRESSURE: 70 MMHG | SYSTOLIC BLOOD PRESSURE: 149 MMHG

## 2019-08-25 VITALS — SYSTOLIC BLOOD PRESSURE: 136 MMHG | DIASTOLIC BLOOD PRESSURE: 70 MMHG

## 2019-08-25 VITALS — DIASTOLIC BLOOD PRESSURE: 79 MMHG | SYSTOLIC BLOOD PRESSURE: 129 MMHG

## 2019-08-25 LAB
HCT VFR BLD AUTO: 49.5 % (ref 36–47)
HGB BLD-MCNC: 16.3 G/DL (ref 12–15.5)
MCH RBC QN AUTO: 29.5 PG (ref 27–33)
MCHC RBC AUTO-ENTMCNC: 32.9 G/DL (ref 32–36.5)
MCV RBC AUTO: 89.7 FL (ref 80–96)
PLATELET # BLD AUTO: 242 10^3/UL (ref 150–450)
RBC # BLD AUTO: 5.52 10^6/UL (ref 4–5.4)
WBC # BLD AUTO: 8.5 10^3/UL (ref 4–10)

## 2019-08-25 RX ADMIN — INSULIN LISPRO SCH UNITS: 100 INJECTION, SOLUTION INTRAVENOUS; SUBCUTANEOUS at 17:41

## 2019-08-25 RX ADMIN — FLUTICASONE PROPIONATE SCH SPRAY: 50 SPRAY, METERED NASAL at 07:47

## 2019-08-25 RX ADMIN — CLOPIDOGREL BISULFATE SCH MG: 75 TABLET ORAL at 07:45

## 2019-08-25 RX ADMIN — ENOXAPARIN SODIUM SCH MG: 40 INJECTION SUBCUTANEOUS at 07:47

## 2019-08-25 RX ADMIN — INSULIN LISPRO SCH UNITS: 100 INJECTION, SOLUTION INTRAVENOUS; SUBCUTANEOUS at 12:08

## 2019-08-25 RX ADMIN — ASPIRIN 81 MG CHEWABLE TABLET SCH MG: 81 TABLET CHEWABLE at 07:46

## 2019-08-25 RX ADMIN — INSULIN LISPRO SCH UNITS: 100 INJECTION, SOLUTION INTRAVENOUS; SUBCUTANEOUS at 20:54

## 2019-08-25 RX ADMIN — SENNOSIDES SCH TAB: 8.6 TABLET, FILM COATED ORAL at 20:53

## 2019-08-25 RX ADMIN — INSULIN LISPRO SCH UNITS: 100 INJECTION, SOLUTION INTRAVENOUS; SUBCUTANEOUS at 07:45

## 2019-08-25 RX ADMIN — LISINOPRIL SCH MG: 10 TABLET ORAL at 20:52

## 2019-08-25 RX ADMIN — LATANOPROST SCH DROP: 50 SOLUTION OPHTHALMIC at 20:54

## 2019-08-25 RX ADMIN — BUPROPION HYDROCHLORIDE SCH MG: 150 TABLET, FILM COATED, EXTENDED RELEASE ORAL at 07:46

## 2019-08-25 RX ADMIN — DOCUSATE SODIUM SCH MG: 100 CAPSULE, LIQUID FILLED ORAL at 07:46

## 2019-08-25 RX ADMIN — FLUTICASONE PROPIONATE SCH SPRAY: 50 SPRAY, METERED NASAL at 20:53

## 2019-08-25 RX ADMIN — NYSTATIN SCH DOSE: 100000 POWDER TOPICAL at 20:54

## 2019-08-25 RX ADMIN — IPRATROPIUM BROMIDE AND ALBUTEROL SULFATE SCH ML: .5; 3 SOLUTION RESPIRATORY (INHALATION) at 07:10

## 2019-08-25 RX ADMIN — DOCUSATE SODIUM SCH MG: 100 CAPSULE, LIQUID FILLED ORAL at 20:53

## 2019-08-25 RX ADMIN — IPRATROPIUM BROMIDE AND ALBUTEROL SULFATE SCH ML: .5; 3 SOLUTION RESPIRATORY (INHALATION) at 20:12

## 2019-08-25 RX ADMIN — ATORVASTATIN CALCIUM SCH MG: 20 TABLET, FILM COATED ORAL at 07:46

## 2019-08-25 RX ADMIN — PANTOPRAZOLE SODIUM SCH MG: 40 TABLET, DELAYED RELEASE ORAL at 07:46

## 2019-08-26 VITALS — DIASTOLIC BLOOD PRESSURE: 78 MMHG | SYSTOLIC BLOOD PRESSURE: 131 MMHG

## 2019-08-26 VITALS — DIASTOLIC BLOOD PRESSURE: 62 MMHG | SYSTOLIC BLOOD PRESSURE: 128 MMHG

## 2019-08-26 VITALS — SYSTOLIC BLOOD PRESSURE: 123 MMHG | DIASTOLIC BLOOD PRESSURE: 57 MMHG

## 2019-08-26 RX ADMIN — ATORVASTATIN CALCIUM SCH MG: 20 TABLET, FILM COATED ORAL at 08:42

## 2019-08-26 RX ADMIN — IPRATROPIUM BROMIDE AND ALBUTEROL SULFATE SCH ML: .5; 3 SOLUTION RESPIRATORY (INHALATION) at 07:19

## 2019-08-26 RX ADMIN — PANTOPRAZOLE SODIUM SCH MG: 40 TABLET, DELAYED RELEASE ORAL at 08:42

## 2019-08-26 RX ADMIN — NYSTATIN SCH DOSE: 100000 POWDER TOPICAL at 21:03

## 2019-08-26 RX ADMIN — FLUTICASONE PROPIONATE SCH SPRAY: 50 SPRAY, METERED NASAL at 08:43

## 2019-08-26 RX ADMIN — DOCUSATE SODIUM SCH MG: 100 CAPSULE, LIQUID FILLED ORAL at 08:43

## 2019-08-26 RX ADMIN — BUPROPION HYDROCHLORIDE SCH MG: 150 TABLET, FILM COATED, EXTENDED RELEASE ORAL at 08:42

## 2019-08-26 RX ADMIN — LATANOPROST SCH DROP: 50 SOLUTION OPHTHALMIC at 21:04

## 2019-08-26 RX ADMIN — LISINOPRIL SCH MG: 10 TABLET ORAL at 21:03

## 2019-08-26 RX ADMIN — INSULIN LISPRO SCH UNITS: 100 INJECTION, SOLUTION INTRAVENOUS; SUBCUTANEOUS at 17:59

## 2019-08-26 RX ADMIN — NYSTATIN SCH DOSE: 100000 POWDER TOPICAL at 08:44

## 2019-08-26 RX ADMIN — FLUTICASONE PROPIONATE SCH SPRAY: 50 SPRAY, METERED NASAL at 21:04

## 2019-08-26 RX ADMIN — CLOPIDOGREL BISULFATE SCH MG: 75 TABLET ORAL at 08:42

## 2019-08-26 RX ADMIN — INSULIN LISPRO SCH UNITS: 100 INJECTION, SOLUTION INTRAVENOUS; SUBCUTANEOUS at 21:00

## 2019-08-26 RX ADMIN — INSULIN LISPRO SCH UNITS: 100 INJECTION, SOLUTION INTRAVENOUS; SUBCUTANEOUS at 08:43

## 2019-08-26 RX ADMIN — DOCUSATE SODIUM SCH MG: 100 CAPSULE, LIQUID FILLED ORAL at 21:02

## 2019-08-26 RX ADMIN — SENNOSIDES SCH TAB: 8.6 TABLET, FILM COATED ORAL at 21:02

## 2019-08-26 RX ADMIN — ACETAMINOPHEN PRN MG: 325 TABLET ORAL at 08:41

## 2019-08-26 RX ADMIN — INSULIN LISPRO SCH UNITS: 100 INJECTION, SOLUTION INTRAVENOUS; SUBCUTANEOUS at 12:08

## 2019-08-26 RX ADMIN — IPRATROPIUM BROMIDE AND ALBUTEROL SULFATE SCH ML: .5; 3 SOLUTION RESPIRATORY (INHALATION) at 21:18

## 2019-08-26 RX ADMIN — ASPIRIN 81 MG CHEWABLE TABLET SCH MG: 81 TABLET CHEWABLE at 08:42

## 2019-08-26 RX ADMIN — ENOXAPARIN SODIUM SCH MG: 40 INJECTION SUBCUTANEOUS at 08:43

## 2019-08-27 VITALS — DIASTOLIC BLOOD PRESSURE: 73 MMHG | SYSTOLIC BLOOD PRESSURE: 119 MMHG

## 2019-08-27 VITALS — DIASTOLIC BLOOD PRESSURE: 75 MMHG | SYSTOLIC BLOOD PRESSURE: 136 MMHG

## 2019-08-27 VITALS — DIASTOLIC BLOOD PRESSURE: 56 MMHG | SYSTOLIC BLOOD PRESSURE: 118 MMHG

## 2019-08-27 RX ADMIN — NYSTATIN SCH DOSE: 100000 POWDER TOPICAL at 20:41

## 2019-08-27 RX ADMIN — IPRATROPIUM BROMIDE AND ALBUTEROL SULFATE SCH ML: .5; 3 SOLUTION RESPIRATORY (INHALATION) at 07:31

## 2019-08-27 RX ADMIN — PANTOPRAZOLE SODIUM SCH MG: 40 TABLET, DELAYED RELEASE ORAL at 08:45

## 2019-08-27 RX ADMIN — SENNOSIDES SCH TAB: 8.6 TABLET, FILM COATED ORAL at 20:39

## 2019-08-27 RX ADMIN — ATORVASTATIN CALCIUM SCH MG: 20 TABLET, FILM COATED ORAL at 08:45

## 2019-08-27 RX ADMIN — DOCUSATE SODIUM SCH MG: 100 CAPSULE, LIQUID FILLED ORAL at 20:40

## 2019-08-27 RX ADMIN — DOCUSATE SODIUM SCH MG: 100 CAPSULE, LIQUID FILLED ORAL at 08:45

## 2019-08-27 RX ADMIN — BUPROPION HYDROCHLORIDE SCH MG: 150 TABLET, FILM COATED, EXTENDED RELEASE ORAL at 08:45

## 2019-08-27 RX ADMIN — NYSTATIN SCH DOSE: 100000 POWDER TOPICAL at 08:46

## 2019-08-27 RX ADMIN — LISINOPRIL SCH MG: 10 TABLET ORAL at 20:39

## 2019-08-27 RX ADMIN — FLUTICASONE PROPIONATE SCH SPRAY: 50 SPRAY, METERED NASAL at 08:46

## 2019-08-27 RX ADMIN — LATANOPROST SCH DROP: 50 SOLUTION OPHTHALMIC at 20:40

## 2019-08-27 RX ADMIN — IPRATROPIUM BROMIDE AND ALBUTEROL SULFATE SCH ML: .5; 3 SOLUTION RESPIRATORY (INHALATION) at 21:33

## 2019-08-27 RX ADMIN — INSULIN LISPRO SCH UNITS: 100 INJECTION, SOLUTION INTRAVENOUS; SUBCUTANEOUS at 17:08

## 2019-08-27 RX ADMIN — INSULIN LISPRO SCH UNITS: 100 INJECTION, SOLUTION INTRAVENOUS; SUBCUTANEOUS at 12:58

## 2019-08-27 RX ADMIN — ENOXAPARIN SODIUM SCH MG: 40 INJECTION SUBCUTANEOUS at 08:44

## 2019-08-27 RX ADMIN — FLUTICASONE PROPIONATE SCH SPRAY: 50 SPRAY, METERED NASAL at 20:40

## 2019-08-27 RX ADMIN — INSULIN LISPRO SCH UNITS: 100 INJECTION, SOLUTION INTRAVENOUS; SUBCUTANEOUS at 20:40

## 2019-08-27 RX ADMIN — INSULIN LISPRO SCH UNITS: 100 INJECTION, SOLUTION INTRAVENOUS; SUBCUTANEOUS at 08:45

## 2019-08-27 RX ADMIN — ASPIRIN 81 MG CHEWABLE TABLET SCH MG: 81 TABLET CHEWABLE at 08:45

## 2019-08-27 RX ADMIN — CLOPIDOGREL BISULFATE SCH MG: 75 TABLET ORAL at 08:45

## 2019-08-27 NOTE — IPN
DATE OF SERVICE:  08/27/2019

 

This is a 54-year-old female on the acute rehab unit with a history of

right-sided CVA, left-sided hemiparesis who was transferred from [facility] to Rehab

for acute rehab for recent left-sided CVA. She has a slight left facial droop,

weakened left arm. She had no specific complaints today. Vital signs have been

stable. Hemoglobin and hematocrit is slightly elevated at 16.3, 49.5.

Electrolytes were normal. BUN 18, creatinine 0.81. She has a history of diabetes,

fasting blood sugar was 174. She is on sliding scale, pureed diet. She continues

on dual antiplatelet therapy. Recheck CBC in the morning. She is having slight

dysphagia, is tolerating purees, level 2, and nectar. She has a history of

obstructive sleep apnea, continues with continuous positive airway pressure

(CPAP).

 

OBJECTIVE:

Blood pressure 136/75, pulse 66, respirations 18, temperature 97.8, oxygen

saturation (O2 sat) 91% on room air.

The patient is alert and oriented times three.

Pupils equal and reactive to light. Extraocular movements intact. Cornea and

sclerae clear. Conjunctivae normal. She has a slight left-sided facial droop.

Tongue is midline.

Neck is supple without lymphadenopathy. No thyromegaly. No goiter.

Chest is clear to auscultation without wheeze or retractions.

Heart is regular.

Abdomen benign. Bowel sounds positive.

Genital/Rectal: Not done.

Extremities: No cyanosis, clubbing or edema. Left upper extremity flaccid.

Skin is warm and dry.

Peripheral pulses equal and palpable bilaterally.

 

IMPRESSION/PLAN:

Status post CVA, new onset stroke, bilateral basal ganglia. She continues with

aspirin, Plavix, statin.

 

Hypertension, stable. Continue with lisinopril and hydrochlorothiazide.

 

Diabetes. Continue fingerstick blood sugars and insulin coverage.

 

Obstructive sleep apnea (GIULIANO). Continues with CPAP.

 

Deep vein thrombosis (DVT) prophylaxis with Lovenox and thromboembolism

deterrents (TEDs).

 

Gastrointestinal (GI) prophylaxis with Protonix.

 

Dysphagia. Continues with level 2 diet, nectar-thick liquids.

## 2019-08-28 VITALS — DIASTOLIC BLOOD PRESSURE: 67 MMHG | SYSTOLIC BLOOD PRESSURE: 137 MMHG

## 2019-08-28 VITALS — DIASTOLIC BLOOD PRESSURE: 72 MMHG | SYSTOLIC BLOOD PRESSURE: 130 MMHG

## 2019-08-28 VITALS — SYSTOLIC BLOOD PRESSURE: 140 MMHG | DIASTOLIC BLOOD PRESSURE: 88 MMHG

## 2019-08-28 LAB
ALBUMIN SERPL BCG-MCNC: 3.2 GM/DL (ref 3.2–5.2)
ALT SERPL W P-5'-P-CCNC: 21 U/L (ref 12–78)
BASOPHILS # BLD AUTO: 0.1 10^3/UL (ref 0–0.2)
BASOPHILS NFR BLD AUTO: 1.1 % (ref 0–1)
BILIRUB SERPL-MCNC: 0.5 MG/DL (ref 0.2–1)
BUN SERPL-MCNC: 25 MG/DL (ref 7–18)
CALCIUM SERPL-MCNC: 9.3 MG/DL (ref 8.5–10.1)
CHLORIDE SERPL-SCNC: 102 MEQ/L (ref 98–107)
CO2 SERPL-SCNC: 31 MEQ/L (ref 21–32)
CREAT SERPL-MCNC: 0.73 MG/DL (ref 0.55–1.3)
EOSINOPHIL # BLD AUTO: 0.2 10^3/UL (ref 0–0.5)
EOSINOPHIL NFR BLD AUTO: 1.8 % (ref 0–3)
GFR SERPL CREATININE-BSD FRML MDRD: > 60 ML/MIN/{1.73_M2} (ref 51–?)
GLUCOSE SERPL-MCNC: 177 MG/DL (ref 70–100)
HCT VFR BLD AUTO: 48 % (ref 36–47)
HCT VFR BLD AUTO: 48.5 % (ref 36–47)
HGB BLD-MCNC: 16.1 G/DL (ref 12–15.5)
HGB BLD-MCNC: 16.1 G/DL (ref 12–15.5)
LYMPHOCYTES # BLD AUTO: 1.6 10^3/UL (ref 1.5–4.5)
LYMPHOCYTES NFR BLD AUTO: 18 % (ref 24–44)
MCH RBC QN AUTO: 29.5 PG (ref 27–33)
MCH RBC QN AUTO: 30 PG (ref 27–33)
MCHC RBC AUTO-ENTMCNC: 33.2 G/DL (ref 32–36.5)
MCHC RBC AUTO-ENTMCNC: 33.5 G/DL (ref 32–36.5)
MCV RBC AUTO: 89 FL (ref 80–96)
MCV RBC AUTO: 89.4 FL (ref 80–96)
MONOCYTES # BLD AUTO: 0.6 10^3/UL (ref 0–0.8)
MONOCYTES NFR BLD AUTO: 6.6 % (ref 0–5)
NEUTROPHILS # BLD AUTO: 6.6 10^3/UL (ref 1.8–7.7)
NEUTROPHILS NFR BLD AUTO: 72.2 % (ref 36–66)
PLATELET # BLD AUTO: 242 10^3/UL (ref 150–450)
PLATELET # BLD AUTO: 246 10^3/UL (ref 150–450)
POTASSIUM SERPL-SCNC: 4.2 MEQ/L (ref 3.5–5.1)
PROT SERPL-MCNC: 6.5 GM/DL (ref 6.4–8.2)
RBC # BLD AUTO: 5.37 10^6/UL (ref 4–5.4)
RBC # BLD AUTO: 5.45 10^6/UL (ref 4–5.4)
SODIUM SERPL-SCNC: 138 MEQ/L (ref 136–145)
WBC # BLD AUTO: 9 10^3/UL (ref 4–10)
WBC # BLD AUTO: 9.1 10^3/UL (ref 4–10)

## 2019-08-28 RX ADMIN — ASPIRIN 81 MG CHEWABLE TABLET SCH MG: 81 TABLET CHEWABLE at 08:30

## 2019-08-28 RX ADMIN — PANTOPRAZOLE SODIUM SCH MG: 40 TABLET, DELAYED RELEASE ORAL at 08:31

## 2019-08-28 RX ADMIN — LATANOPROST SCH DROP: 50 SOLUTION OPHTHALMIC at 21:05

## 2019-08-28 RX ADMIN — INSULIN LISPRO SCH UNITS: 100 INJECTION, SOLUTION INTRAVENOUS; SUBCUTANEOUS at 21:00

## 2019-08-28 RX ADMIN — FLUTICASONE PROPIONATE SCH SPRAY: 50 SPRAY, METERED NASAL at 08:32

## 2019-08-28 RX ADMIN — ATORVASTATIN CALCIUM SCH MG: 20 TABLET, FILM COATED ORAL at 08:31

## 2019-08-28 RX ADMIN — FLUTICASONE PROPIONATE SCH SPRAY: 50 SPRAY, METERED NASAL at 21:05

## 2019-08-28 RX ADMIN — SENNOSIDES SCH TAB: 8.6 TABLET, FILM COATED ORAL at 21:04

## 2019-08-28 RX ADMIN — LISINOPRIL SCH MG: 10 TABLET ORAL at 21:05

## 2019-08-28 RX ADMIN — IPRATROPIUM BROMIDE AND ALBUTEROL SULFATE SCH ML: .5; 3 SOLUTION RESPIRATORY (INHALATION) at 07:34

## 2019-08-28 RX ADMIN — NYSTATIN SCH DOSE: 100000 POWDER TOPICAL at 08:32

## 2019-08-28 RX ADMIN — DOCUSATE SODIUM SCH MG: 100 CAPSULE, LIQUID FILLED ORAL at 08:32

## 2019-08-28 RX ADMIN — NYSTATIN SCH DOSE: 100000 POWDER TOPICAL at 21:06

## 2019-08-28 RX ADMIN — CLOPIDOGREL BISULFATE SCH MG: 75 TABLET ORAL at 08:30

## 2019-08-28 RX ADMIN — ACETAMINOPHEN PRN MG: 325 TABLET ORAL at 08:30

## 2019-08-28 RX ADMIN — BUPROPION HYDROCHLORIDE SCH MG: 150 TABLET, FILM COATED, EXTENDED RELEASE ORAL at 08:31

## 2019-08-28 RX ADMIN — LIDOCAINE SCH PATCH: 50 PATCH CUTANEOUS at 21:05

## 2019-08-28 RX ADMIN — IPRATROPIUM BROMIDE AND ALBUTEROL SULFATE SCH ML: .5; 3 SOLUTION RESPIRATORY (INHALATION) at 21:33

## 2019-08-28 RX ADMIN — INSULIN LISPRO SCH UNITS: 100 INJECTION, SOLUTION INTRAVENOUS; SUBCUTANEOUS at 12:54

## 2019-08-28 RX ADMIN — INSULIN LISPRO SCH UNITS: 100 INJECTION, SOLUTION INTRAVENOUS; SUBCUTANEOUS at 17:16

## 2019-08-28 RX ADMIN — DOCUSATE SODIUM SCH MG: 100 CAPSULE, LIQUID FILLED ORAL at 21:04

## 2019-08-28 RX ADMIN — ENOXAPARIN SODIUM SCH MG: 40 INJECTION SUBCUTANEOUS at 08:31

## 2019-08-28 RX ADMIN — INSULIN LISPRO SCH UNITS: 100 INJECTION, SOLUTION INTRAVENOUS; SUBCUTANEOUS at 08:31

## 2019-08-28 NOTE — IPNPDOC
PM&R Progress Note


DATE OF SERVICE:  Aug 28, 2019


Physiatrist Progress Note


Subjective:


Patient states she is having trouble sleeping because her low back is hurting 

her. She also has a bruise on her right lower abdomen, but does not recall inj

uring it.





REVIEW OF SYSTEMS: The following is a completed review of systems and has been 

reviewed. Review of systems otherwise unremarkable.


PAIN: Patient self reports no pain


EYES: No recent vision changes


EARS, NOSE, & THROAT: +dysphagia


CARDIOVASCULAR: Denies chest pain or palpitations


PULMONARY: Denies shortness of breath, except with exertion


GASTROINTESTINAL: Denies constipation/diarrhea


GENITOURINARY: denies dysuria


MUSCULOSKELETAL: LUE weakness and LLE weakness


NEUROLOGICAL: left sided paresis, facial droop


SKIN: no rash


PSYCHIATRIC: +schizophrenia


All other review of systems found to be negative.








PHYSICAL EXAMINATION:


VITAL SIGNS: Please see below.


GENERAL: Pleasant and cooperative. No acute distress. obese


HEENT: PERRL. Extraocular movements intact. Clear conjunctiva, left sided facial

droop


CARDIOVASCULAR: Regular rate and rhythm. No murmurs, rubs, or gallops


LUNGS: decreased breath sounds, no wheezes appreciated


ABDOMEN: Soft, nontender, nondistended. Positive bowel sounds. Normal active 

bowel sounds


NEUROLOGICAL: Alert and oriented times three. Cranial nerves II through XII 

grossly intact except left facial droop. Sensation grossly intact  to light 

touch all 4 extremities


equivocal babinksi bilat, no clonus


EXTREMITIES: 5\5 strength right upper extremities. flaccid LUE, 5\5 strength 

right lower extremity. 4/5 strength in left lower extremity. 





SKIN:keratotic, RLQ of her abdomen with ecchymosis, no induration, non-TTP








ASSESSMENT:54-year-old F with past medical history of obesity, HTN, who presents

status post stroke.





PLAN:


1. Rehab: PT- strengthen/stretch/maintain ROM bilat LE, improve gait and 

endurance, family training- poor trunk control


OT-strengthen/stretch/maintain ROM bilat UE, improve trunk control and ADL 

management


SLP- significant dysphagia, c/u puree diet with honey thickened  


2. Neuro: recent CVA with new onset stroke in the bilateral basal ganglia and 

corona radiata- c/u ASA and Plavix, c/u statin, f/u with [facility] stroke clinic


-prozac  q for motor recovery


3. Cardio: pmh HTN, c/u lisinopril and HCTZ- medicine consulted to assist in 

management


4. Resp: heavy smoker, c/u Duonebs, GIULIANO c/u CPAP, monitor pulse-ox and for 

infection


-CXR 8/23/19, no infiltrate


5. Psych- pmh schizophrenia c/u Wellbutryn


6. DVT ppx: lovenox and TEDs


7. GI ppx- protonix


8. : monitor PVRs


9. Pain: lidoderm patch to low back qHS, will add standing tylenol


10. Skin: abdominal ecchymosis likely due transfers, will monitor- Hgb stable


9. Dispo: tbd


Allergies


Coded Allergies:  


     No Known Allergies (Verified , 8/26/04)





Vital Signs





Vital Signs








  Date Time  Temp Pulse Resp B/P (MAP) Pulse Ox O2 Delivery O2 Flow Rate FiO2


 


8/28/19 14:00 97.6 72 18 137/67 (90) 90   











Laboratory Data


CBC/BMP


Laboratory Tests


8/28/19 06:51








Red Blood Count 5.37, Mean Corpuscular Volume 89.4, Mean Corpuscular Hemoglobin 

30.0, Mean Corpuscular Hemoglobin Concent 33.5, Red Cell Distribution Width 

13.6, Neutrophils (%) (Auto) 72.2 H, Lymphocytes (%) (Auto) 18.0 L, Monocytes 

(%) (Auto) 6.6 H, Eosinophils (%) (Auto) 1.8, Basophils (%) (Auto) 1.1 H, [place]

trophils # (Auto) 6.6, Lymphocytes # (Auto) 1.6, Monocytes # (Auto) 0.6, 

Eosinophils # (Auto) 0.2, Basophils # (Auto) 0.1, Calcium Level 9.3, Aspartate 

Amino Transf (AST/SGOT) 10, Alanine Aminotransferase (ALT/SGPT) 21, Alkaline 

Phosphatase 78, Total Bilirubin 0.5, Total Protein 6.5, Albumin 3.2


Labs 24H


Laboratory Tests 2


8/27/19 16:42: Bedside Glucose (Misc Panel) 176H


8/27/19 20:28: Bedside Glucose (Misc Panel) 187H


8/28/19 06:51: 


Immature Granulocyte % (Auto) 0.3, White Blood Count 9.1, Red Blood Count 5.37, 

Hemoglobin 16.1H, Hematocrit 48.0H, Mean Corpuscular Volume 89.4, Mean 

Corpuscular Hemoglobin 30.0, Mean Corpuscular Hemoglobin Concent 33.5, Red Cell 

Distribution Width 13.6, Platelet Count 246, Neutrophils (%) (Auto) 72.2H, 

Lymphocytes (%) (Auto) 18.0L, Monocytes (%) (Auto) 6.6H, Eosinophils (%) (Auto) 

1.8, Basophils (%) (Auto) 1.1H, Neutrophils # (Auto) 6.6, Lymphocytes # (Auto) 

1.6, Monocytes # (Auto) 0.6, Eosinophils # (Auto) 0.2, Basophils # (Auto) 0.1, 

Nucleated Red Blood Cells % (auto) 0.0, Anion Gap 5L, Glomerular Filtration Rate

> 60.0, Blood Urea Nitrogen 25H, Creatinine 0.73, Sodium Level 138, Potassium 

Level 4.2, Chloride Level 102, Carbon Dioxide Level 31, Calcium Level 9.3, 

Aspartate Amino Transf (AST/SGOT) 10, Alanine Aminotransferase (ALT/SGPT) 21, 

Alkaline Phosphatase 78, Total Bilirubin 0.5, Total Protein 6.5, Albumin 3.2, 

Albumin/Globulin Ratio 0.97L


8/28/19 12:50: Bedside Glucose (Misc Panel) 195H





Microbiology





Microbiology


8/25/19 Urine Culture - Final, Complete





Current Medications


Current Medications





Current Medications








 Medications


  (Trade)  Dose


 Ordered  Sig/Rose


 Route


 PRN Reason  Start Time


 Stop Time Status Last Admin


Dose Admin


 


 Acetaminophen


  (Tylenol Tab)  650 mg  Q4HP  PRN


 PO


 fever/MILD PAIN (PS 1-4)  8/22/19 20:15


    8/28/19 08:30





 


 Albuterol/


 Ipratropium


  (Duoneb (Ipr


 0.5mg/Alb 2.5mg))  3 ml  RBID


 NEB


   8/23/19 08:00


    8/28/19 07:34





 


 Albuterol/


 Ipratropium


  (Duoneb (Ipr


 0.5mg/Alb 2.5mg))  3 ml  RQ8H  PRN


 NEB


 WHEEZING  8/22/19 20:15


     





 


 Aspirin


  (Aspirin


 Chewable)  81 mg  DAILY


 PO


   8/23/19 09:00


    8/28/19 08:30





 


 Atorvastatin


 Calcium


  (Lipitor)  80 mg  DAILY


 PO


   8/23/19 09:00


    8/28/19 08:31





 


 Bisacodyl


  (Dulcolax


 Suppository)  10 mg  DAILYPRN  PRN


 AR


 CONSTIPATION  8/22/19 20:15


     





 


 Bupropion HCl


  (Wellbutrin Xl)  150 mg  QAM


 PO


   8/23/19 09:00


    8/28/19 08:31





 


 Clopidogrel


 Bisulfate


  (PLAVix)  75 mg  DAILY


 PO


   8/23/19 09:00


    8/28/19 08:30





 


 Dextrose


  (Dextrose 50%)  25 ml  ASDIRECTED  PRN


 IV


 SEE LABEL COMMENTS  8/22/19 20:15


     





 


 Docusate Sodium


  (Colace)  100 mg  BID


 PO


   8/22/19 21:00


    8/28/19 08:32





 


 Enoxaparin Sodium


  (Lovenox)  40 mg  DAILY


 SC


   8/23/19 09:00


    8/28/19 08:31





 


 Fluoxetine HCl


  (PROzac)  20 mg  QHS


 PO


   8/23/19 21:00


    8/27/19 20:40





 


 Fluticasone


 Propionate


  (Flonase 0.05%


 Nasal Spray)  1 spray  BID


 NARES


   8/22/19 21:00


    8/28/19 08:32





 


 Glucagon


  (Glucagon)  1 mg  ASDIRECTED  PRN


 SC


 SEE LABEL COMMENTS  8/22/19 20:15


     





 


 Glucose


  (Glucose)  16 GM  ASDIRECTED  PRN


 PO


 SEE LABEL COMMENTS  8/22/19 20:15


     





 


 Guaifenesin


  (Robitussin Tab)  400 mg  TID


 PO


   8/23/19 16:00


    8/28/19 16:18





 


 Home Med


  (Med Rec


 Complete!)    ASDIRECTED


 XX


   8/22/19 21:15


 8/22/19 21:15 DC  





 


 Hydrochlorothiazide


  (Hydrodiuril)  25 mg  DAILY


 PO


   8/23/19 09:00


    8/28/19 08:31





 


 Insulin Human


 Lispro


  (HumaLOG INSULIN)  SEE


 PROTOCOL


 TABLE  AC


 SC


   8/23/19 07:30


    8/28/19 12:54





 


 Insulin Human


 Lispro


  (HumaLOG INSULIN)  SEE


 PROTOCOL


 TABLE  QHS


 SC


   8/22/19 21:00


     





 


 Latanoprost


  (Xalatan 0.005%


 Op Soln)  1 drop  QHS


 OU


   8/22/19 21:00


    8/27/19 20:40





 


 Lidocaine


  (Lidoderm Patch)  2 patch  QHS


 TD


   8/28/19 21:00


     





 


 Lisinopril


  (Prinivil)  10 mg  QHS


 PO


   8/23/19 21:00


    8/27/19 20:39





 


 Magnesium


 Hydroxide


  (Milk Of


 Magnesia)  30 ml  DAILYPRN  PRN


 PO


 CONSTIPATION  8/22/19 20:15


     





 


 Non-Formulary


 Medication


  (** See Comment


 Field Below **)  REMOVE


 LIDODERM


 PATCH  DAILY@0900


 XX


   8/29/19 09:00


     





 


 Nystatin


  (Mycostatin


 Powder, Nystop)    BID


 TOP


   8/25/19 21:00


    8/28/19 08:32





 


 Pantoprazole


 Sodium


  (Protonix)  40 mg  DAILY


 PO


   8/23/19 09:00


    8/28/19 08:31





 


 Senna


  (Senokot)  1 tab  QHS


 PO


   8/22/19 21:00


    8/27/19 20:39




















JASWANT ALFARO MD         Aug 28, 2019 16:25

## 2019-08-28 NOTE — REP
COOKIE SWALLOW

 

The procedure was performed under the direct supervision of Dr. Garcia.

 

The procedure was performed with Tena Savage from speech pathology present.

 

5 ml aliquots of thin, nectar, honey, pudding and soft solid consistency barium

was administered.  With thin, nectar and honey consistency barium there is

laryngeal penetration.

 

The detailed report of this examination will be provided by speech pathology.

 

0.8 minutes of fluoroscopy time was utilized for this procedure.

 

 

Reviewed by

ANI Rose 08/28/2019 04:04 P

Electronically Signed by

Andre Garcia MD 08/28/2019 04:33 P

## 2019-08-28 NOTE — IPNPDOC
PM&R Progress Note


DATE OF SERVICE:  Aug 27, 2019


Physiatrist Progress Note


Subjective:


Patient states she feels ok, she urinated on herself stating she needs help 

getting to eh bathroom.





REVIEW OF SYSTEMS: The following is a completed review of systems and has been 

reviewed. Review of systems otherwise unremarkable.


PAIN: Patient self reports no pain


EYES: No recent vision changes


EARS, NOSE, & THROAT: +dysphagia


CARDIOVASCULAR: Denies chest pain or palpitations


PULMONARY: Denies shortness of breath, except with exertion


GASTROINTESTINAL: Denies constipation/diarrhea


GENITOURINARY: denies dysuria


MUSCULOSKELETAL: LUE weakness and LLE weakness


NEUROLOGICAL: left sided paresis, facial droop


SKIN: no rash


PSYCHIATRIC: +schizophrenia


All other review of systems found to be negative.








PHYSICAL EXAMINATION:


VITAL SIGNS: Please see below.


GENERAL: Pleasant and cooperative. No acute distress. obese


HEENT: PERRL. Extraocular movements intact. Clear conjunctiva, left sided facial

droop


CARDIOVASCULAR: Regular rate and rhythm. No murmurs, rubs, or gallops


LUNGS: decreased breath sounds, no wheezes appreciated


ABDOMEN: Soft, nontender, nondistended. Positive bowel sounds. Normal active 

bowel sounds


NEUROLOGICAL: Alert and oriented times three. Cranial nerves II through XII 

grossly intact except left facial droop. Sensation grossly intact  to light 

touch all 4 extremities


equivocal babinksi bilat, no clonus


EXTREMITIES: 5\5 strength right upper extremities. flaccid LUE, 5\5 strength 

right lower extremity. 4/5 strength in left lower extremity. 





SKIN:keratotic 








ASSESSMENT:54-year-old F with past medical history of obesity, HTN, who presents

status post stroke.





PLAN:


1. Rehab: PT- strengthen/stretch/maintain ROM bilat LE, improve gait and 

endurance, family training- poor trunk control


OT-strengthen/stretch/maintain ROM bilat UE, improve trunk control and ADL 

management


SLP- significant dysphagia, c/u puree diet with honey thickened  


2. Neuro: recent CVA with new onset stroke in the bilateral basal ganglia and 

corona radiata- c/u ASA and Plavix, c/u statin, f/u with [facility] stroke clinic


-proza  qhs for motor recovery


3. Cardio: pmh HTN, c/u lisinopril and HCTZ- medicine consulted to assist in 

management


4. Resp: heavy smoker, c/u Duonebs, GIULIANO c/u CPAP, monitor pulse-ox and for 

infection


-CXR 8/23/19, no infiltrate


5. Psych- pmh schizophrenia c/u Wellbutryn


6. DVT ppx: lovenox and TEDs


7. GI ppx- protonix


8. : monitor PVRs


9. Dispo: tbd


Allergies


Coded Allergies:  


     No Known Allergies (Verified , 8/26/04)





Vital Signs





Vital Signs








  Date Time  Temp Pulse Resp B/P (MAP) Pulse Ox O2 Delivery O2 Flow Rate FiO2


 


8/28/19 14:00 97.6 72 18 137/67 (90) 90   











Laboratory Data


CBC/BMP


Laboratory Tests


8/28/19 06:51








Red Blood Count 5.37, Mean Corpuscular Volume 89.4, Mean Corpuscular Hemoglobin 

30.0, Mean Corpuscular Hemoglobin Concent 33.5, Red Cell Distribution Width 

13.6, Neutrophils (%) (Auto) 72.2 H, Lymphocytes (%) (Auto) 18.0 L, Monocytes 

(%) (Auto) 6.6 H, Eosinophils (%) (Auto) 1.8, Basophils (%) (Auto) 1.1 H, 

Neutrophils # (Auto) 6.6, Lymphocytes # (Auto) 1.6, Monocytes # (Auto) 0.6, 

Eosinophils # (Auto) 0.2, Basophils # (Auto) 0.1, Calcium Level 9.3, Aspartate 

Amino Transf (AST/SGOT) 10, Alanine Aminotransferase (ALT/SGPT) 21, Alkaline 

Phosphatase 78, Total Bilirubin 0.5, Total Protein 6.5, Albumin 3.2


Labs 24H


Laboratory Tests 2


8/27/19 16:42: Bedside Glucose (Misc Panel) 176H


8/27/19 20:28: Bedside Glucose (Misc Panel) 187H


8/28/19 06:51: 


Immature Granulocyte % (Auto) 0.3, White Blood Count 9.1, Red Blood Count 5.37, 

Hemoglobin 16.1H, Hematocrit 48.0H, Mean Corpuscular Volume 89.4, Mean 

Corpuscular Hemoglobin 30.0, Mean Corpuscular Hemoglobin Concent 33.5, Red Cell 

Distribution Width 13.6, Platelet Count 246, Neutrophils (%) (Auto) 72.2H, 

Lymphocytes (%) (Auto) 18.0L, Monocytes (%) (Auto) 6.6H, Eosinophils (%) (Auto) 

1.8, Basophils (%) (Auto) 1.1H, Neutrophils # (Auto) 6.6, Lymphocytes # (Auto) 

1.6, Monocytes # (Auto) 0.6, Eosinophils # (Auto) 0.2, Basophils # (Auto) 0.1, 

Nucleated Red Blood Cells % (auto) 0.0, Anion Gap 5L, Glomerular Filtration Rate

> 60.0, Blood Urea Nitrogen 25H, Creatinine 0.73, Sodium Level 138, Potassium 

Level 4.2, Chloride Level 102, Carbon Dioxide Level 31, Calcium Level 9.3, 

Aspartate Amino Transf (AST/SGOT) 10, Alanine Aminotransferase (ALT/SGPT) 21, 

Alkaline Phosphatase 78, Total Bilirubin 0.5, Total Protein 6.5, Albumin 3.2, 

Albumin/Globulin Ratio 0.97L


8/28/19 12:50: Bedside Glucose (Misc Panel) 195H





Microbiology





Microbiology


8/25/19 Urine Culture - Final, Complete





Current Medications


Current Medications





Current Medications








 Medications


  (Trade)  Dose


 Ordered  Sig/Rose


 Route


 PRN Reason  Start Time


 Stop Time Status Last Admin


Dose Admin


 


 Acetaminophen


  (Tylenol Tab)  650 mg  Q4HP  PRN


 PO


 fever/MILD PAIN (PS 1-4)  8/22/19 20:15


    8/28/19 08:30





 


 Albuterol/


 Ipratropium


  (Duoneb (Ipr


 0.5mg/Alb 2.5mg))  3 ml  RBID


 NEB


   8/23/19 08:00


    8/28/19 07:34





 


 Albuterol/


 Ipratropium


  (Duoneb (Ipr


 0.5mg/Alb 2.5mg))  3 ml  RQ8H  PRN


 NEB


 WHEEZING  8/22/19 20:15


     





 


 Aspirin


  (Aspirin


 Chewable)  81 mg  DAILY


 PO


   8/23/19 09:00


    8/28/19 08:30





 


 Atorvastatin


 Calcium


  (Lipitor)  80 mg  DAILY


 PO


   8/23/19 09:00


    8/28/19 08:31





 


 Bisacodyl


  (Dulcolax


 Suppository)  10 mg  DAILYPRN  PRN


 ND


 CONSTIPATION  8/22/19 20:15


     





 


 Bupropion HCl


  (Wellbutrin Xl)  150 mg  QAM


 PO


   8/23/19 09:00


    8/28/19 08:31





 


 Clopidogrel


 Bisulfate


  (PLAVix)  75 mg  DAILY


 PO


   8/23/19 09:00


    8/28/19 08:30





 


 Dextrose


  (Dextrose 50%)  25 ml  ASDIRECTED  PRN


 IV


 SEE LABEL COMMENTS  8/22/19 20:15


     





 


 Docusate Sodium


  (Colace)  100 mg  BID


 PO


   8/22/19 21:00


    8/28/19 08:32





 


 Enoxaparin Sodium


  (Lovenox)  40 mg  DAILY


 SC


   8/23/19 09:00


    8/28/19 08:31





 


 Fluoxetine HCl


  (PROzac)  20 mg  QHS


 PO


   8/23/19 21:00


    8/27/19 20:40





 


 Fluticasone


 Propionate


  (Flonase 0.05%


 Nasal Spray)  1 spray  BID


 NARES


   8/22/19 21:00


    8/28/19 08:32





 


 Glucagon


  (Glucagon)  1 mg  ASDIRECTED  PRN


 SC


 SEE LABEL COMMENTS  8/22/19 20:15


     





 


 Glucose


  (Glucose)  16 GM  ASDIRECTED  PRN


 PO


 SEE LABEL COMMENTS  8/22/19 20:15


     





 


 Guaifenesin


  (Robitussin Tab)  400 mg  TID


 PO


   8/23/19 16:00


    8/28/19 16:18





 


 Home Med


  (Med Rec


 Complete!)    ASDIRECTED


 XX


   8/22/19 21:15


 8/22/19 21:15 DC  





 


 Hydrochlorothiazide


  (Hydrodiuril)  25 mg  DAILY


 PO


   8/23/19 09:00


    8/28/19 08:31





 


 Insulin Human


 Lispro


  (HumaLOG INSULIN)  SEE


 PROTOCOL


 TABLE  AC


 SC


   8/23/19 07:30


    8/28/19 12:54





 


 Insulin Human


 Lispro


  (HumaLOG INSULIN)  SEE


 PROTOCOL


 TABLE  QHS


 SC


   8/22/19 21:00


     





 


 Latanoprost


  (Xalatan 0.005%


 Op Soln)  1 drop  QHS


 OU


   8/22/19 21:00


    8/27/19 20:40





 


 Lidocaine


  (Lidoderm Patch)  2 patch  QHS


 TD


   8/28/19 21:00


     





 


 Lisinopril


  (Prinivil)  10 mg  QHS


 PO


   8/23/19 21:00


    8/27/19 20:39





 


 Magnesium


 Hydroxide


  (Milk Of


 Magnesia)  30 ml  DAILYPRN  PRN


 PO


 CONSTIPATION  8/22/19 20:15


     





 


 Non-Formulary


 Medication


  (** See Comment


 Field Below **)  REMOVE


 LIDODERM


 PATCH  DAILY@0900


 XX


   8/29/19 09:00


     





 


 Nystatin


  (Mycostatin


 Powder, Nystop)    BID


 TOP


   8/25/19 21:00


    8/28/19 08:32





 


 Pantoprazole


 Sodium


  (Protonix)  40 mg  DAILY


 PO


   8/23/19 09:00


    8/28/19 08:31





 


 Senna


  (Senokot)  1 tab  QHS


 PO


   8/22/19 21:00


    8/27/19 20:39




















JASWANT ALFARO MD         Aug 28, 2019 16:22

## 2019-08-28 NOTE — IPNPDOC
Text Note


Date of Service


The patient was seen on 8/28/19.





NOTE


Subjective:


Patient is a 54-year-old  female with who transferred to ARU from [facility] for left-sided hemiparesis after she had a CVA. 





Patient was seen and examined at the bedside. Patient has no new concerns 

overnight. . She denies nausea, vomiting, abdominal pain, constipation, or 

urinary discomfort.





Objective:


Vitals (See below)


General: Lying in bed, no acute distress, comfortable, AAOx3


HEENT: NC, AT


CVS: RRR, +S1S2


Lungs: Fair air entry b/l, -w/r/r


Abdomen: Soft, ND, NT


Extremities: - Edema, - Calf tenderness


Neuro: Weakness of left upper and lower extremities





Assessment and plan:


s/p Acute CVA at bilateral basal ganglia


- c/w ASA, Plavix and Atorvastatin


- Physical therapy at the direction of ARU





Dysphagia / Slurred speech - 2/2 above


- c/w level 2 diet, nectar-thick liquids





HTN


- Blood pressure remains well-controlled


- c/w HCTZ and Lisinopril





DM2


- c/w ISS


 


GIULIANO on CPAP


- c/w CPAP





Mood disorder


- c/w Fluoxetine and Bupropion 





GI prophylaxis


- c/w Protonix 





DVT prophylaxis


- c/w Lovenox





VS,Fishbone, I+O


VS, Fishbone, I+O


Laboratory Tests


8/28/19 06:51








Red Blood Count 5.37, Mean Corpuscular Volume 89.4, Mean Corpuscular Hemoglobin 

30.0, Mean Corpuscular Hemoglobin Concent 33.5, Red Cell Distribution Width 

13.6, Neutrophils (%) (Auto) 72.2 H, Lymphocytes (%) (Auto) 18.0 L, Monocytes 

(%) (Auto) 6.6 H, Eosinophils (%) (Auto) 1.8, Basophils (%) (Auto) 1.1 H, 

Neutrophils # (Auto) 6.6, Lymphocytes # (Auto) 1.6, Monocytes # (Auto) 0.6, 

Eosinophils # (Auto) 0.2, Basophils # (Auto) 0.1, Calcium Level 9.3, Aspartate 

Amino Transf (AST/SGOT) 10, Alanine Aminotransferase (ALT/SGPT) 21, Alkaline 

Phosphatase 78, Total Bilirubin 0.5, Total Protein 6.5, Albumin 3.2








Vital Signs








  Date Time  Temp Pulse Resp B/P (MAP) Pulse Ox O2 Delivery O2 Flow Rate FiO2


 


8/28/19 06:00 96.4 78 18 130/72 (91) 92   














I&O- Last 24 Hours up to 6 AM 


 


 8/28/19





 06:00


 


Intake Total 620 ml


 


Balance 620 ml

















KAMAR DIXON MD                Aug 28, 2019 10:44 1.77

## 2019-08-29 VITALS — SYSTOLIC BLOOD PRESSURE: 116 MMHG | DIASTOLIC BLOOD PRESSURE: 70 MMHG

## 2019-08-29 VITALS — DIASTOLIC BLOOD PRESSURE: 80 MMHG | SYSTOLIC BLOOD PRESSURE: 135 MMHG

## 2019-08-29 VITALS — DIASTOLIC BLOOD PRESSURE: 68 MMHG | SYSTOLIC BLOOD PRESSURE: 131 MMHG

## 2019-08-29 RX ADMIN — IPRATROPIUM BROMIDE AND ALBUTEROL SULFATE SCH ML: .5; 3 SOLUTION RESPIRATORY (INHALATION) at 07:18

## 2019-08-29 RX ADMIN — ACETAMINOPHEN SCH MG: 500 TABLET ORAL at 20:08

## 2019-08-29 RX ADMIN — IPRATROPIUM BROMIDE AND ALBUTEROL SULFATE SCH ML: .5; 3 SOLUTION RESPIRATORY (INHALATION) at 20:05

## 2019-08-29 RX ADMIN — LISINOPRIL SCH MG: 10 TABLET ORAL at 20:09

## 2019-08-29 RX ADMIN — INSULIN LISPRO SCH UNITS: 100 INJECTION, SOLUTION INTRAVENOUS; SUBCUTANEOUS at 20:10

## 2019-08-29 RX ADMIN — ASPIRIN 81 MG CHEWABLE TABLET SCH MG: 81 TABLET CHEWABLE at 09:10

## 2019-08-29 RX ADMIN — ATORVASTATIN CALCIUM SCH MG: 20 TABLET, FILM COATED ORAL at 09:11

## 2019-08-29 RX ADMIN — DOCUSATE SODIUM SCH MG: 100 CAPSULE, LIQUID FILLED ORAL at 09:11

## 2019-08-29 RX ADMIN — SENNOSIDES SCH TAB: 8.6 TABLET, FILM COATED ORAL at 20:09

## 2019-08-29 RX ADMIN — Medication SCH: at 09:24

## 2019-08-29 RX ADMIN — INSULIN LISPRO SCH UNITS: 100 INJECTION, SOLUTION INTRAVENOUS; SUBCUTANEOUS at 12:32

## 2019-08-29 RX ADMIN — INSULIN LISPRO SCH UNITS: 100 INJECTION, SOLUTION INTRAVENOUS; SUBCUTANEOUS at 17:24

## 2019-08-29 RX ADMIN — CLOPIDOGREL BISULFATE SCH MG: 75 TABLET ORAL at 09:10

## 2019-08-29 RX ADMIN — LIDOCAINE SCH PATCH: 50 PATCH CUTANEOUS at 20:10

## 2019-08-29 RX ADMIN — FLUTICASONE PROPIONATE SCH SPRAY: 50 SPRAY, METERED NASAL at 20:10

## 2019-08-29 RX ADMIN — BUPROPION HYDROCHLORIDE SCH MG: 150 TABLET, FILM COATED, EXTENDED RELEASE ORAL at 09:11

## 2019-08-29 RX ADMIN — PANTOPRAZOLE SODIUM SCH MG: 40 TABLET, DELAYED RELEASE ORAL at 09:11

## 2019-08-29 RX ADMIN — INSULIN LISPRO SCH UNITS: 100 INJECTION, SOLUTION INTRAVENOUS; SUBCUTANEOUS at 09:12

## 2019-08-29 RX ADMIN — LATANOPROST SCH DROP: 50 SOLUTION OPHTHALMIC at 20:10

## 2019-08-29 RX ADMIN — NYSTATIN SCH DOSE: 100000 POWDER TOPICAL at 09:17

## 2019-08-29 RX ADMIN — NYSTATIN SCH DOSE: 100000 POWDER TOPICAL at 20:10

## 2019-08-29 RX ADMIN — DOCUSATE SODIUM SCH MG: 100 CAPSULE, LIQUID FILLED ORAL at 20:09

## 2019-08-29 RX ADMIN — ENOXAPARIN SODIUM SCH MG: 40 INJECTION SUBCUTANEOUS at 09:10

## 2019-08-29 RX ADMIN — ACETAMINOPHEN SCH MG: 500 TABLET ORAL at 16:38

## 2019-08-29 RX ADMIN — FLUTICASONE PROPIONATE SCH SPRAY: 50 SPRAY, METERED NASAL at 09:16

## 2019-08-29 NOTE — IPNPDOC
PM&R Progress Note


DATE OF SERVICE:  Aug 29, 2019


Physiatrist Progress Note


Subjective:


Patient stating the lidoderm patch partially helped, but she needs the patch 

closer to her right side. 





REVIEW OF SYSTEMS: The following is a completed review of systems and has been 

reviewed. Review of systems otherwise unremarkable.


PAIN: Patient self reports no pain


EYES: No recent vision changes


EARS, NOSE, & THROAT: +dysphagia


CARDIOVASCULAR: Denies chest pain or palpitations


PULMONARY: Denies shortness of breath, except with exertion


GASTROINTESTINAL: Denies constipation/diarrhea


GENITOURINARY: denies dysuria


MUSCULOSKELETAL: LUE weakness and LLE weakness


NEUROLOGICAL: left sided paresis, facial droop


SKIN: no rash


PSYCHIATRIC: +schizophrenia


All other review of systems found to be negative.








PHYSICAL EXAMINATION:


VITAL SIGNS: Please see below.


GENERAL: Pleasant and cooperative. No acute distress. obese


HEENT: PERRL. Extraocular movements intact. Clear conjunctiva, left sided facial

droop


CARDIOVASCULAR: Regular rate and rhythm. No murmurs, rubs, or gallops


LUNGS: decreased breath sounds, no wheezes appreciated


ABDOMEN: Soft, nontender, nondistended. Positive bowel sounds. Normal active 

bowel sounds


NEUROLOGICAL: Alert and oriented times three. Cranial nerves II through XII 

grossly intact except left facial droop. Sensation grossly intact  to light 

touch all 4 extremities


equivocal babinksi bilat, no clonus


EXTREMITIES: 5\5 strength right upper extremities. flaccid LUE, 5\5 strength 

right lower extremity. 4/5 strength in left lower extremity. 





SKIN:keratotic, RLQ of her abdomen with ecchymosis, no induration, non-TTP








ASSESSMENT:54-year-old F with past medical history of obesity, HTN, who presents

status post stroke.





PLAN:


1. Rehab: PT- strengthen/stretch/maintain ROM bilat LE, improve gait and 

endurance, family training- poor trunk control


OT-strengthen/stretch/maintain ROM bilat UE, improve trunk control and ADL 

management


SLP- significant dysphagia, c/u puree diet with honey thickened  


2. Neuro: recent CVA with new onset stroke in the bilateral basal ganglia and 

corona radiata- c/u ASA and Plavix, c/u statin, f/u with [facility] stroke [facility]


-proza  q for motor recovery


3. Cardio: pmh HTN, c/u lisinopril and HCTZ- medicine consulted to assist in 

management


4. Resp: heavy smoker, c/u Duonebs, GIULIANO c/u CPAP, monitor pulse-ox and for infec

tion


-CXR 8/23/19, no infiltrate


5. Psych- pmh schizophrenia c/u Wellbutryn


6. DVT ppx: lovenox and TEDs


7. GI ppx- protonix


8. : monitor PVRs


9. Pain: lidoderm patch to low back qHS, c/u standing tylenol


10. Skin: abdominal ecchymosis likely due transfers, will monitor- Hgb stable


9. Dispo: 9/17/19 to home, progressing slowly towards goals


Allergies


Coded Allergies:  


     No Known Allergies (Verified , 8/26/04)





Vital Signs





Vital Signs








  Date Time  Temp Pulse Resp B/P (MAP) Pulse Ox O2 Delivery O2 Flow Rate FiO2


 


8/29/19 05:35 97.8 60 18 131/68 (89) 95   











Laboratory Data


Labs 24H


Laboratory Tests 2


8/28/19 12:50: Bedside Glucose (Misc Panel) 195H


8/28/19 17:05: Bedside Glucose (Misc Panel) 126H


8/28/19 20:08: Bedside Glucose (Misc Panel) 165H


8/29/19 06:28: Bedside Glucose (Misc Panel) 184H





Microbiology





Microbiology


8/25/19 Urine Culture - Final, Complete





Current Medications


Current Medications





Current Medications








 Medications


  (Trade)  Dose


 Ordered  Sig/Rose


 Route


 PRN Reason  Start Time


 Stop Time Status Last Admin


Dose Admin


 


 Acetaminophen


  (Tylenol Tab)  650 mg  Q4HP  PRN


 PO


 fever/MILD PAIN (PS 1-4)  8/22/19 20:15


    8/28/19 08:30





 


 Albuterol/


 Ipratropium


  (Duoneb (Ipr


 0.5mg/Alb 2.5mg))  3 ml  RBID


 NEB


   8/23/19 08:00


    8/29/19 07:18





 


 Albuterol/


 Ipratropium


  (Duoneb (Ipr


 0.5mg/Alb 2.5mg))  3 ml  RQ8H  PRN


 NEB


 WHEEZING  8/22/19 20:15


     





 


 Aspirin


  (Aspirin


 Chewable)  81 mg  DAILY


 PO


   8/23/19 09:00


    8/29/19 09:10





 


 Atorvastatin


 Calcium


  (Lipitor)  80 mg  DAILY


 PO


   8/23/19 09:00


    8/29/19 09:11





 


 Bisacodyl


  (Dulcolax


 Suppository)  10 mg  DAILYPRN  PRN


 CO


 CONSTIPATION  8/22/19 20:15


     





 


 Bupropion HCl


  (Wellbutrin Xl)  150 mg  QAM


 PO


   8/23/19 09:00


    8/29/19 09:11





 


 Clopidogrel


 Bisulfate


  (PLAVix)  75 mg  DAILY


 PO


   8/23/19 09:00


    8/29/19 09:10





 


 Dextrose


  (Dextrose 50%)  25 ml  ASDIRECTED  PRN


 IV


 SEE LABEL COMMENTS  8/22/19 20:15


     





 


 Docusate Sodium


  (Colace)  100 mg  BID


 PO


   8/22/19 21:00


    8/29/19 09:11





 


 Enoxaparin Sodium


  (Lovenox)  40 mg  DAILY


 SC


   8/23/19 09:00


    8/29/19 09:10





 


 Fluoxetine HCl


  (PROzac)  20 mg  QHS


 PO


   8/23/19 21:00


    8/28/19 21:05





 


 Fluticasone


 Propionate


  (Flonase 0.05%


 Nasal Spray)  1 spray  BID


 NARES


   8/22/19 21:00


    8/29/19 09:16





 


 Glucagon


  (Glucagon)  1 mg  ASDIRECTED  PRN


 SC


 SEE LABEL COMMENTS  8/22/19 20:15


     





 


 Glucose


  (Glucose)  16 GM  ASDIRECTED  PRN


 PO


 SEE LABEL COMMENTS  8/22/19 20:15


     





 


 Guaifenesin


  (Robitussin Tab)  400 mg  TID


 PO


   8/23/19 16:00


    8/29/19 09:11





 


 Home Med


  (Med Rec


 Complete!)    ASDIRECTED


 XX


   8/22/19 21:15


 8/22/19 21:15 DC  





 


 Hydrochlorothiazide


  (Hydrodiuril)  25 mg  DAILY


 PO


   8/23/19 09:00


    8/29/19 09:11





 


 Insulin Human


 Lispro


  (HumaLOG INSULIN)  SEE


 PROTOCOL


 TABLE  AC


 SC


   8/23/19 07:30


    8/29/19 09:12





 


 Insulin Human


 Lispro


  (HumaLOG INSULIN)  SEE


 PROTOCOL


 TABLE  QHS


 SC


   8/22/19 21:00


     





 


 Latanoprost


  (Xalatan 0.005%


 Op Soln)  1 drop  QHS


 OU


   8/22/19 21:00


    8/28/19 21:05





 


 Lidocaine


  (Lidoderm Patch)  2 patch  QHS


 TD


   8/28/19 21:00


    8/28/19 21:05





 


 Lisinopril


  (Prinivil)  10 mg  QHS


 PO


   8/23/19 21:00


    8/28/19 21:05





 


 Magnesium


 Hydroxide


  (Milk Of


 Magnesia)  30 ml  DAILYPRN  PRN


 PO


 CONSTIPATION  8/22/19 20:15


     





 


 Non-Formulary


 Medication


  (** See Comment


 Field Below **)  REMOVE


 LIDODERM


 PATCH  DAILY@0900


 XX


   8/29/19 09:00


    8/29/19 09:24





 


 Nystatin


  (Mycostatin


 Powder, Nystop)    BID


 TOP


   8/25/19 21:00


    8/29/19 09:17





 


 Pantoprazole


 Sodium


  (Protonix)  40 mg  DAILY


 PO


   8/23/19 09:00


    8/29/19 09:11





 


 Senna


  (Senokot)  1 tab  QHS


 PO


   8/22/19 21:00


    8/28/19 21:04




















JASWANT ALFARO MD         Aug 29, 2019 11:27

## 2019-08-30 VITALS — SYSTOLIC BLOOD PRESSURE: 134 MMHG | DIASTOLIC BLOOD PRESSURE: 77 MMHG

## 2019-08-30 VITALS — SYSTOLIC BLOOD PRESSURE: 154 MMHG | DIASTOLIC BLOOD PRESSURE: 54 MMHG

## 2019-08-30 VITALS — SYSTOLIC BLOOD PRESSURE: 126 MMHG | DIASTOLIC BLOOD PRESSURE: 66 MMHG

## 2019-08-30 RX ADMIN — NYSTATIN SCH DOSE: 100000 POWDER TOPICAL at 07:51

## 2019-08-30 RX ADMIN — LIDOCAINE SCH PATCH: 50 PATCH CUTANEOUS at 21:48

## 2019-08-30 RX ADMIN — INSULIN LISPRO SCH UNITS: 100 INJECTION, SOLUTION INTRAVENOUS; SUBCUTANEOUS at 12:38

## 2019-08-30 RX ADMIN — INSULIN LISPRO SCH UNITS: 100 INJECTION, SOLUTION INTRAVENOUS; SUBCUTANEOUS at 07:47

## 2019-08-30 RX ADMIN — ENOXAPARIN SODIUM SCH MG: 40 INJECTION SUBCUTANEOUS at 07:47

## 2019-08-30 RX ADMIN — IPRATROPIUM BROMIDE AND ALBUTEROL SULFATE SCH ML: .5; 3 SOLUTION RESPIRATORY (INHALATION) at 08:06

## 2019-08-30 RX ADMIN — ATORVASTATIN CALCIUM SCH MG: 20 TABLET, FILM COATED ORAL at 07:48

## 2019-08-30 RX ADMIN — DOCUSATE SODIUM SCH MG: 100 CAPSULE, LIQUID FILLED ORAL at 21:39

## 2019-08-30 RX ADMIN — INSULIN LISPRO SCH UNITS: 100 INJECTION, SOLUTION INTRAVENOUS; SUBCUTANEOUS at 21:00

## 2019-08-30 RX ADMIN — NYSTATIN SCH DOSE: 100000 POWDER TOPICAL at 21:47

## 2019-08-30 RX ADMIN — ACETAMINOPHEN SCH MG: 500 TABLET ORAL at 07:50

## 2019-08-30 RX ADMIN — MENTHOL, METHYL SALICYLATE SCH DOSE: 10; 15 CREAM TOPICAL at 14:30

## 2019-08-30 RX ADMIN — ACETAMINOPHEN SCH MG: 500 TABLET ORAL at 16:31

## 2019-08-30 RX ADMIN — CLOPIDOGREL BISULFATE SCH MG: 75 TABLET ORAL at 09:22

## 2019-08-30 RX ADMIN — SENNOSIDES SCH TAB: 8.6 TABLET, FILM COATED ORAL at 21:40

## 2019-08-30 RX ADMIN — PANTOPRAZOLE SODIUM SCH MG: 40 TABLET, DELAYED RELEASE ORAL at 07:49

## 2019-08-30 RX ADMIN — Medication SCH: at 07:50

## 2019-08-30 RX ADMIN — FLUTICASONE PROPIONATE SCH SPRAY: 50 SPRAY, METERED NASAL at 21:45

## 2019-08-30 RX ADMIN — INSULIN LISPRO SCH UNITS: 100 INJECTION, SOLUTION INTRAVENOUS; SUBCUTANEOUS at 17:47

## 2019-08-30 RX ADMIN — ASPIRIN 81 MG CHEWABLE TABLET SCH MG: 81 TABLET CHEWABLE at 07:51

## 2019-08-30 RX ADMIN — LATANOPROST SCH DROP: 50 SOLUTION OPHTHALMIC at 21:46

## 2019-08-30 RX ADMIN — MENTHOL, METHYL SALICYLATE SCH DOSE: 10; 15 CREAM TOPICAL at 16:32

## 2019-08-30 RX ADMIN — DOCUSATE SODIUM SCH MG: 100 CAPSULE, LIQUID FILLED ORAL at 07:52

## 2019-08-30 RX ADMIN — ACETAMINOPHEN SCH MG: 500 TABLET ORAL at 21:39

## 2019-08-30 RX ADMIN — BUPROPION HYDROCHLORIDE SCH MG: 150 TABLET, FILM COATED, EXTENDED RELEASE ORAL at 07:49

## 2019-08-30 RX ADMIN — FLUTICASONE PROPIONATE SCH SPRAY: 50 SPRAY, METERED NASAL at 07:51

## 2019-08-30 RX ADMIN — IPRATROPIUM BROMIDE AND ALBUTEROL SULFATE SCH ML: .5; 3 SOLUTION RESPIRATORY (INHALATION) at 20:33

## 2019-08-30 RX ADMIN — LISINOPRIL SCH MG: 10 TABLET ORAL at 21:40

## 2019-08-30 RX ADMIN — MENTHOL, METHYL SALICYLATE SCH DOSE: 10; 15 CREAM TOPICAL at 21:46

## 2019-08-30 NOTE — IPNPDOC
PM&R Progress Note


DATE OF SERVICE:  Aug 30, 2019


Physiatrist Progress Note


Subjective:


Patient stating she is feeling ok, reports a persistent cough, but denies fevers

or chills.





REVIEW OF SYSTEMS: The following is a completed review of systems and has been 

reviewed. Review of systems otherwise unremarkable.


PAIN: Patient self reports no pain


EYES: No recent vision changes


EARS, NOSE, & THROAT: +dysphagia


CARDIOVASCULAR: Denies chest pain or palpitations


PULMONARY: Denies shortness of breath, except with exertion


GASTROINTESTINAL: Denies constipation/diarrhea


GENITOURINARY: denies dysuria


MUSCULOSKELETAL: LUE weakness and LLE weakness


NEUROLOGICAL: left sided paresis, facial droop


SKIN: no rash


PSYCHIATRIC: +schizophrenia


All other review of systems found to be negative.








PHYSICAL EXAMINATION:


VITAL SIGNS: Please see below.


GENERAL: Pleasant and cooperative. No acute distress. obese


HEENT: PERRL. Extraocular movements intact. Clear conjunctiva, left sided facial

droop


CARDIOVASCULAR: Regular rate and rhythm. No murmurs, rubs, or gallops


LUNGS: decreased breath sounds, no wheezes appreciated


ABDOMEN: Soft, nontender, nondistended. Positive bowel sounds. Normal active 

bowel sounds


NEUROLOGICAL: Alert and oriented times three. Cranial nerves II through XII 

grossly intact except left facial droop. Sensation grossly intact  to light 

touch all 4 extremities


equivocal babinksi bilat, no clonus


EXTREMITIES: 5\5 strength right upper extremities. flaccid LUE, 5\5 strength 

right lower extremity. 4/5 strength in left lower extremity. 





SKIN:keratotic, RLQ of her abdomen with ecchymosis, no induration, non-TTP








ASSESSMENT:54-year-old F with past medical history of obesity, HTN, who presents

status post stroke.





PLAN:


1. Rehab: PT- strengthen/stretch/maintain ROM bilat LE, improve gait and 

endurance, family training- poor trunk control


OT-strengthen/stretch/maintain ROM bilat UE, improve trunk control and ADL 

management


SLP- significant dysphagia, c/u puree diet with honey thickened  


2. Neuro: recent CVA with new onset stroke in the bilateral basal ganglia and 

corona radiata- c/u ASA and Plavix, c/u statin, f/u with [facility] stroke [facility]


-proza  qhs for motor recovery


3. Cardio: pmh HTN, c/u lisinopril and HCTZ- medicine consulted to assist in 

management


4. Resp: heavy smoker, c/u Duonebs, GIULIANO c/u CPAP, monitor pulse-ox and for 

infection


-CXR 8/23/19, no infiltrate


5. Psych- pmh schizophrenia c/u Wellbutryn


6. DVT ppx: lovenox and TEDs


7. GI ppx- protonix


8. : monitor PVRs


9. Pain: lidoderm patch to low back qHS, c/u standing tylenol, adding oxycodone


10. Skin: abdominal ecchymosis likely due transfers, will monitor- Hgb stable


9. Dispo: 9/17/19 to home, progressing slowly towards goals


Allergies


Coded Allergies:  


     No Known Allergies (Verified , 8/26/04)





Vital Signs





Vital Signs








  Date Time  Temp Pulse Resp B/P (MAP) Pulse Ox O2 Delivery O2 Flow Rate FiO2


 


8/30/19 04:41 97.8 60 18 154/54 (87) 95   











Laboratory Data


Labs 24H


Laboratory Tests 2


8/29/19 12:13: Bedside Glucose (Misc Panel) 222H


8/29/19 16:53: Bedside Glucose (Misc Panel) 162H


8/29/19 20:06: Bedside Glucose (Misc Panel) 182H


8/30/19 06:10: Bedside Glucose (Misc Panel) 193H





Microbiology





Microbiology


8/25/19 Urine Culture - Final, Complete





Current Medications


Current Medications





Current Medications








 Medications


  (Trade)  Dose


 Ordered  Sig/Rose


 Route


 PRN Reason  Start Time


 Stop Time Status Last Admin


Dose Admin


 


 Acetaminophen


  (Tylenol Tab)  650 mg  Q4HP  PRN


 PO


 fever/MILD PAIN (PS 1-4)  8/22/19 20:15


 8/29/19 11:26 DC 8/28/19 08:30





 


 Acetaminophen


  (Tylenol Tab)  1,000 mg  TID


 PO


   8/29/19 16:00


    8/30/19 07:50





 


 Albuterol/


 Ipratropium


  (Duoneb (Ipr


 0.5mg/Alb 2.5mg))  3 ml  RBID


 NEB


   8/23/19 08:00


    8/30/19 08:06





 


 Albuterol/


 Ipratropium


  (Duoneb (Ipr


 0.5mg/Alb 2.5mg))  3 ml  RQ8H  PRN


 NEB


 WHEEZING  8/22/19 20:15


     





 


 Aspirin


  (Aspirin


 Chewable)  81 mg  DAILY


 PO


   8/23/19 09:00


    8/30/19 07:51





 


 Atorvastatin


 Calcium


  (Lipitor)  80 mg  DAILY


 PO


   8/23/19 09:00


    8/30/19 07:48





 


 Bisacodyl


  (Dulcolax


 Suppository)  10 mg  DAILYPRN  PRN


 SD


 CONSTIPATION  8/22/19 20:15


     





 


 Bupropion HCl


  (Wellbutrin Xl)  150 mg  QAM


 PO


   8/23/19 09:00


    8/30/19 07:49





 


 Clopidogrel


 Bisulfate


  (PLAVix)  75 mg  DAILY


 PO


   8/23/19 09:00


    8/30/19 09:22





 


 Dextrose


  (Dextrose 50%)  25 ml  ASDIRECTED  PRN


 IV


 SEE LABEL COMMENTS  8/22/19 20:15


     





 


 Docusate Sodium


  (Colace)  100 mg  BID


 PO


   8/22/19 21:00


    8/30/19 07:52





 


 Enoxaparin Sodium


  (Lovenox)  40 mg  DAILY


 SC


   8/23/19 09:00


    8/30/19 07:47





 


 Fluoxetine HCl


  (PROzac)  20 mg  QHS


 PO


   8/23/19 21:00


    8/29/19 20:09





 


 Fluticasone


 Propionate


  (Flonase 0.05%


 Nasal Spray)  1 spray  BID


 NARES


   8/22/19 21:00


    8/30/19 07:51





 


 Glucagon


  (Glucagon)  1 mg  ASDIRECTED  PRN


 SC


 SEE LABEL COMMENTS  8/22/19 20:15


     





 


 Glucose


  (Glucose)  16 GM  ASDIRECTED  PRN


 PO


 SEE LABEL COMMENTS  8/22/19 20:15


     





 


 Guaifenesin


  (Robitussin Tab)  400 mg  TID


 PO


   8/23/19 16:00


    8/30/19 07:48





 


 Home Med


  (Med Rec


 Complete!)    ASDIRECTED


 XX


   8/22/19 21:15


 8/22/19 21:15 DC  





 


 Hydrochlorothiazide


  (Hydrodiuril)  25 mg  DAILY


 PO


   8/23/19 09:00


    8/30/19 07:52





 


 Insulin Human


 Lispro


  (HumaLOG INSULIN)  SEE


 PROTOCOL


 TABLE  AC


 SC


   8/23/19 07:30


    8/30/19 07:47





 


 Insulin Human


 Lispro


  (HumaLOG INSULIN)  SEE


 PROTOCOL


 TABLE  QHS


 SC


   8/22/19 21:00


     





 


 Latanoprost


  (Xalatan 0.005%


 Op Soln)  1 drop  QHS


 OU


   8/22/19 21:00


    8/29/19 20:10





 


 Lidocaine


  (Lidoderm Patch)  2 patch  QHS


 TD


   8/28/19 21:00


    8/29/19 20:10





 


 Lisinopril


  (Prinivil)  10 mg  QHS


 PO


   8/23/19 21:00


    8/29/19 20:09





 


 Magnesium


 Hydroxide


  (Milk Of


 Magnesia)  30 ml  DAILYPRN  PRN


 PO


 CONSTIPATION  8/22/19 20:15


     





 


 Non-Formulary


 Medication


  (** See Comment


 Field Below **)  REMOVE


 LIDODERM


 PATCH  DAILY@0900


 XX


   8/29/19 09:00


    8/30/19 07:50





 


 Nystatin


  (Mycostatin


 Powder, Nystop)    BID


 TOP


   8/25/19 21:00


    8/30/19 07:51





 


 Pantoprazole


 Sodium


  (Protonix)  40 mg  DAILY


 PO


   8/23/19 09:00


    8/30/19 07:49





 


 Senna


  (Senokot)  1 tab  QHS


 PO


   8/22/19 21:00


    8/29/19 20:09




















JASWANT ALFARO MD         Aug 30, 2019 11:07

## 2019-08-30 NOTE — IPNPDOC
Text Note


Date of Service


The patient was seen on 8/30/19.





NOTE


Subjective:


Patient is a 54-year-old  female with who transferred to ARU from [facility] for left-sided hemiparesis after she had a CVA. 





Patient was seen and examined at the bedside. Currently, patient reports that 

she is not expressing any problems overnight. . She reports that she's been 

working with physical therapy, however, has been slow to progress. She denies 

any chest pain, shortness of breath or palpitations.





Objective:


Vitals (See below)


General: Lying in bed, no acute distress, comfortable, AAOx3


HEENT: NC, AT


CVS: RRR, +S1S2


Lungs: Fair air entry b/l, no appreciable wheezing, rhonchi or rales


Abdomen: Remains soft without distention or tenderness, obese


Extremities: No evidence of lower extremity edema, - Calf tenderness


Neuro: Still with persistent left upper and lower extremity weakness





Assessment and plan:


s/p Acute CVA at bilateral basal ganglia


- c/w ASA, Plavix and Atorvastatin


- Physical therapy at the direction of ARU; patient will possibly require 

skilled nursing facility





Dysphagia / Slurred speech - 2/2 above


- c/w level 2 diet, nectar-thick liquids





HTN


- Blood pressure remains well-controlled


- c/w HCTZ and Lisinopril





DM2


- c/w ISS


 


GIULIANO on CPAP


- c/w CPAP





Mood disorder


- c/w Fluoxetine and Bupropion 





GI prophylaxis


- c/w Protonix 





DVT prophylaxis


- c/w Lovenox





VS,Fishbone, I+O


VS, Fishbone, I+O





Vital Signs








  Date Time  Temp Pulse Resp B/P (MAP) Pulse Ox O2 Delivery O2 Flow Rate FiO2


 


8/30/19 04:41 97.8 60 18 154/54 (87 95   














I&O- Last 24 Hours up to 6 AM 


 


 8/30/19





 06:00


 


Intake Total 840 ml


 


Output Total 100 ml


 


Balance 740 ml

















KAMAR DIXON MD                Aug 30, 2019 10:44

## 2019-08-31 VITALS — DIASTOLIC BLOOD PRESSURE: 75 MMHG | SYSTOLIC BLOOD PRESSURE: 129 MMHG

## 2019-08-31 VITALS — SYSTOLIC BLOOD PRESSURE: 138 MMHG | DIASTOLIC BLOOD PRESSURE: 69 MMHG

## 2019-08-31 VITALS — DIASTOLIC BLOOD PRESSURE: 64 MMHG | SYSTOLIC BLOOD PRESSURE: 103 MMHG

## 2019-08-31 LAB
HCT VFR BLD AUTO: 49 % (ref 36–47)
HGB BLD-MCNC: 16.2 G/DL (ref 12–15.5)
MCH RBC QN AUTO: 29.6 PG (ref 27–33)
MCHC RBC AUTO-ENTMCNC: 33.1 G/DL (ref 32–36.5)
MCV RBC AUTO: 89.4 FL (ref 80–96)
PLATELET # BLD AUTO: 270 10^3/UL (ref 150–450)
RBC # BLD AUTO: 5.48 10^6/UL (ref 4–5.4)
WBC # BLD AUTO: 8.3 10^3/UL (ref 4–10)

## 2019-08-31 RX ADMIN — DOCUSATE SODIUM SCH MG: 100 CAPSULE, LIQUID FILLED ORAL at 21:52

## 2019-08-31 RX ADMIN — ACETAMINOPHEN SCH MG: 500 TABLET ORAL at 09:01

## 2019-08-31 RX ADMIN — INSULIN LISPRO SCH UNITS: 100 INJECTION, SOLUTION INTRAVENOUS; SUBCUTANEOUS at 17:31

## 2019-08-31 RX ADMIN — ENOXAPARIN SODIUM SCH MG: 40 INJECTION SUBCUTANEOUS at 08:59

## 2019-08-31 RX ADMIN — FLUTICASONE PROPIONATE SCH SPRAY: 50 SPRAY, METERED NASAL at 09:03

## 2019-08-31 RX ADMIN — ACETAMINOPHEN SCH MG: 500 TABLET ORAL at 21:53

## 2019-08-31 RX ADMIN — CLOPIDOGREL BISULFATE SCH MG: 75 TABLET ORAL at 08:59

## 2019-08-31 RX ADMIN — SENNOSIDES SCH TAB: 8.6 TABLET, FILM COATED ORAL at 21:52

## 2019-08-31 RX ADMIN — INSULIN LISPRO SCH UNITS: 100 INJECTION, SOLUTION INTRAVENOUS; SUBCUTANEOUS at 12:40

## 2019-08-31 RX ADMIN — LISINOPRIL SCH MG: 10 TABLET ORAL at 21:52

## 2019-08-31 RX ADMIN — DOCUSATE SODIUM SCH MG: 100 CAPSULE, LIQUID FILLED ORAL at 09:00

## 2019-08-31 RX ADMIN — Medication SCH: at 09:03

## 2019-08-31 RX ADMIN — ATORVASTATIN CALCIUM SCH MG: 20 TABLET, FILM COATED ORAL at 09:01

## 2019-08-31 RX ADMIN — FLUTICASONE PROPIONATE SCH SPRAY: 50 SPRAY, METERED NASAL at 21:53

## 2019-08-31 RX ADMIN — IPRATROPIUM BROMIDE AND ALBUTEROL SULFATE SCH ML: .5; 3 SOLUTION RESPIRATORY (INHALATION) at 19:34

## 2019-08-31 RX ADMIN — INSULIN LISPRO SCH UNITS: 100 INJECTION, SOLUTION INTRAVENOUS; SUBCUTANEOUS at 21:00

## 2019-08-31 RX ADMIN — LATANOPROST SCH DROP: 50 SOLUTION OPHTHALMIC at 21:53

## 2019-08-31 RX ADMIN — LIDOCAINE SCH PATCH: 50 PATCH CUTANEOUS at 21:54

## 2019-08-31 RX ADMIN — MENTHOL, METHYL SALICYLATE SCH DOSE: 10; 15 CREAM TOPICAL at 09:02

## 2019-08-31 RX ADMIN — MENTHOL, METHYL SALICYLATE SCH DOSE: 10; 15 CREAM TOPICAL at 21:53

## 2019-08-31 RX ADMIN — ACETAMINOPHEN SCH MG: 500 TABLET ORAL at 16:47

## 2019-08-31 RX ADMIN — ASPIRIN 81 MG CHEWABLE TABLET SCH MG: 81 TABLET CHEWABLE at 08:59

## 2019-08-31 RX ADMIN — IPRATROPIUM BROMIDE AND ALBUTEROL SULFATE SCH ML: .5; 3 SOLUTION RESPIRATORY (INHALATION) at 07:59

## 2019-08-31 RX ADMIN — PANTOPRAZOLE SODIUM SCH MG: 40 TABLET, DELAYED RELEASE ORAL at 08:59

## 2019-08-31 RX ADMIN — NYSTATIN SCH DOSE: 100000 POWDER TOPICAL at 09:03

## 2019-08-31 RX ADMIN — NYSTATIN SCH DOSE: 100000 POWDER TOPICAL at 21:53

## 2019-08-31 RX ADMIN — INSULIN LISPRO SCH UNITS: 100 INJECTION, SOLUTION INTRAVENOUS; SUBCUTANEOUS at 08:59

## 2019-08-31 RX ADMIN — MENTHOL, METHYL SALICYLATE SCH DOSE: 10; 15 CREAM TOPICAL at 16:48

## 2019-08-31 RX ADMIN — BUPROPION HYDROCHLORIDE SCH MG: 150 TABLET, FILM COATED, EXTENDED RELEASE ORAL at 08:59

## 2019-09-01 VITALS — SYSTOLIC BLOOD PRESSURE: 111 MMHG | DIASTOLIC BLOOD PRESSURE: 59 MMHG

## 2019-09-01 VITALS — SYSTOLIC BLOOD PRESSURE: 117 MMHG | DIASTOLIC BLOOD PRESSURE: 73 MMHG

## 2019-09-01 VITALS — SYSTOLIC BLOOD PRESSURE: 124 MMHG | DIASTOLIC BLOOD PRESSURE: 84 MMHG

## 2019-09-01 RX ADMIN — BUPROPION HYDROCHLORIDE SCH MG: 150 TABLET, FILM COATED, EXTENDED RELEASE ORAL at 08:10

## 2019-09-01 RX ADMIN — Medication SCH: at 08:13

## 2019-09-01 RX ADMIN — LIDOCAINE SCH PATCH: 50 PATCH CUTANEOUS at 21:55

## 2019-09-01 RX ADMIN — INSULIN LISPRO SCH UNITS: 100 INJECTION, SOLUTION INTRAVENOUS; SUBCUTANEOUS at 12:48

## 2019-09-01 RX ADMIN — ASPIRIN 81 MG CHEWABLE TABLET SCH MG: 81 TABLET CHEWABLE at 08:10

## 2019-09-01 RX ADMIN — ENOXAPARIN SODIUM SCH MG: 40 INJECTION SUBCUTANEOUS at 08:10

## 2019-09-01 RX ADMIN — MENTHOL, METHYL SALICYLATE SCH DOSE: 10; 15 CREAM TOPICAL at 08:12

## 2019-09-01 RX ADMIN — ATORVASTATIN CALCIUM SCH MG: 20 TABLET, FILM COATED ORAL at 08:10

## 2019-09-01 RX ADMIN — MENTHOL, METHYL SALICYLATE SCH DOSE: 10; 15 CREAM TOPICAL at 21:56

## 2019-09-01 RX ADMIN — MENTHOL, METHYL SALICYLATE SCH DOSE: 10; 15 CREAM TOPICAL at 16:00

## 2019-09-01 RX ADMIN — DOCUSATE SODIUM SCH MG: 100 CAPSULE, LIQUID FILLED ORAL at 21:56

## 2019-09-01 RX ADMIN — FLUTICASONE PROPIONATE SCH SPRAY: 50 SPRAY, METERED NASAL at 21:56

## 2019-09-01 RX ADMIN — INSULIN LISPRO SCH UNITS: 100 INJECTION, SOLUTION INTRAVENOUS; SUBCUTANEOUS at 08:11

## 2019-09-01 RX ADMIN — ACETAMINOPHEN SCH MG: 500 TABLET ORAL at 16:35

## 2019-09-01 RX ADMIN — LATANOPROST SCH DROP: 50 SOLUTION OPHTHALMIC at 21:56

## 2019-09-01 RX ADMIN — IPRATROPIUM BROMIDE AND ALBUTEROL SULFATE SCH ML: .5; 3 SOLUTION RESPIRATORY (INHALATION) at 20:17

## 2019-09-01 RX ADMIN — NYSTATIN SCH DOSE: 100000 POWDER TOPICAL at 21:57

## 2019-09-01 RX ADMIN — ACETAMINOPHEN SCH MG: 500 TABLET ORAL at 21:56

## 2019-09-01 RX ADMIN — ACETAMINOPHEN SCH MG: 500 TABLET ORAL at 08:12

## 2019-09-01 RX ADMIN — NYSTATIN SCH DOSE: 100000 POWDER TOPICAL at 08:13

## 2019-09-01 RX ADMIN — CLOPIDOGREL BISULFATE SCH MG: 75 TABLET ORAL at 08:10

## 2019-09-01 RX ADMIN — SENNOSIDES SCH TAB: 8.6 TABLET, FILM COATED ORAL at 21:55

## 2019-09-01 RX ADMIN — PANTOPRAZOLE SODIUM SCH MG: 40 TABLET, DELAYED RELEASE ORAL at 08:10

## 2019-09-01 RX ADMIN — IPRATROPIUM BROMIDE AND ALBUTEROL SULFATE SCH ML: .5; 3 SOLUTION RESPIRATORY (INHALATION) at 08:00

## 2019-09-01 RX ADMIN — LISINOPRIL SCH MG: 10 TABLET ORAL at 21:55

## 2019-09-01 RX ADMIN — DOCUSATE SODIUM SCH MG: 100 CAPSULE, LIQUID FILLED ORAL at 08:10

## 2019-09-01 RX ADMIN — INSULIN LISPRO SCH UNITS: 100 INJECTION, SOLUTION INTRAVENOUS; SUBCUTANEOUS at 20:18

## 2019-09-01 RX ADMIN — FLUTICASONE PROPIONATE SCH SPRAY: 50 SPRAY, METERED NASAL at 08:10

## 2019-09-01 RX ADMIN — INSULIN LISPRO SCH UNITS: 100 INJECTION, SOLUTION INTRAVENOUS; SUBCUTANEOUS at 17:26

## 2019-09-01 NOTE — IPNPDOC
Text Note


Date of Service


The patient was seen on 9/1/19.





NOTE


Subjective:


Patient is a 54-year-old  female with who transferred to ARU from [facility] for left-sided hemiparesis after she had a CVA. 





Patient was seen and examined at the bedside. Patient reports she has had an 

uneventful evening. She denies any chest pain, shortness of breath or 

palpitations. She does report some aching of her back, however, she attributes 

this to position. 





Objective:


Vitals (See below)


General: Lying in bed, no acute distress, comfortable, AAOx3


HEENT: NC, AT


CVS: RRR


Lungs: Fair air entry b/l, auscultation is free of rhonchi, rales or wheezing


Abdomen: Soft, no distention, no tenderness, obese


Extremities: Lower extremities do not appear to reveal any edema, - Calf 

tenderness


Neuro: Upper and lower extremities on the left side with persistent weakness





Assessment and plan:


s/p Acute CVA at bilateral basal ganglia


- c/w ASA, Plavix and Atorvastatin


- Physical therapy at the direction of ARU


- Currently patient has been slow to progress with physical therapy





Dysphagia / Slurred speech - 2/2 above


- c/w level 2 diet, nectar-thick liquids





HTN


- Blood pressure remains well-controlled


- c/w HCTZ and Lisinopril





DM2


- c/w ISS


 


GIULIANO on CPAP


- c/w CPAP





Mood disorder


- c/w Fluoxetine and Bupropion 





GI prophylaxis


- c/w Protonix 





DVT prophylaxis


- c/w Lovenox





VS,Fishbone, I+O


VS, Fishbone, I+O





Vital Signs








  Date Time  Temp Pulse Resp B/P (MAP) Pulse Ox O2 Delivery O2 Flow Rate FiO2


 


9/1/19 06:00 96.5 64 18 111/59 (51) 95   














I&O- Last 24 Hours up to 6 AM 


 


 9/1/19





 06:00


 


Intake Total 640 ml


 


Balance 640 ml

















KAMAR DIXON MD                 Sep 1, 2019 09:31

## 2019-09-02 VITALS — SYSTOLIC BLOOD PRESSURE: 127 MMHG | DIASTOLIC BLOOD PRESSURE: 62 MMHG

## 2019-09-02 VITALS — SYSTOLIC BLOOD PRESSURE: 141 MMHG | DIASTOLIC BLOOD PRESSURE: 63 MMHG

## 2019-09-02 VITALS — SYSTOLIC BLOOD PRESSURE: 119 MMHG | DIASTOLIC BLOOD PRESSURE: 62 MMHG

## 2019-09-02 RX ADMIN — BUPROPION HYDROCHLORIDE SCH MG: 150 TABLET, FILM COATED, EXTENDED RELEASE ORAL at 08:46

## 2019-09-02 RX ADMIN — Medication SCH: at 08:49

## 2019-09-02 RX ADMIN — MENTHOL, METHYL SALICYLATE SCH DOSE: 10; 15 CREAM TOPICAL at 21:00

## 2019-09-02 RX ADMIN — NYSTATIN SCH DOSE: 100000 POWDER TOPICAL at 08:47

## 2019-09-02 RX ADMIN — MENTHOL, METHYL SALICYLATE SCH DOSE: 10; 15 CREAM TOPICAL at 15:30

## 2019-09-02 RX ADMIN — DOCUSATE SODIUM SCH MG: 100 CAPSULE, LIQUID FILLED ORAL at 21:46

## 2019-09-02 RX ADMIN — DOCUSATE SODIUM SCH MG: 100 CAPSULE, LIQUID FILLED ORAL at 08:46

## 2019-09-02 RX ADMIN — FLUTICASONE PROPIONATE SCH SPRAY: 50 SPRAY, METERED NASAL at 08:47

## 2019-09-02 RX ADMIN — LIDOCAINE SCH PATCH: 50 PATCH CUTANEOUS at 21:45

## 2019-09-02 RX ADMIN — ACETAMINOPHEN SCH MG: 500 TABLET ORAL at 21:46

## 2019-09-02 RX ADMIN — FLUTICASONE PROPIONATE SCH SPRAY: 50 SPRAY, METERED NASAL at 21:46

## 2019-09-02 RX ADMIN — INSULIN LISPRO SCH UNITS: 100 INJECTION, SOLUTION INTRAVENOUS; SUBCUTANEOUS at 21:00

## 2019-09-02 RX ADMIN — INSULIN LISPRO SCH UNITS: 100 INJECTION, SOLUTION INTRAVENOUS; SUBCUTANEOUS at 08:45

## 2019-09-02 RX ADMIN — INSULIN LISPRO SCH UNITS: 100 INJECTION, SOLUTION INTRAVENOUS; SUBCUTANEOUS at 17:09

## 2019-09-02 RX ADMIN — ENOXAPARIN SODIUM SCH MG: 40 INJECTION SUBCUTANEOUS at 08:45

## 2019-09-02 RX ADMIN — IPRATROPIUM BROMIDE AND ALBUTEROL SULFATE SCH ML: .5; 3 SOLUTION RESPIRATORY (INHALATION) at 07:27

## 2019-09-02 RX ADMIN — LATANOPROST SCH DROP: 50 SOLUTION OPHTHALMIC at 21:46

## 2019-09-02 RX ADMIN — CLOPIDOGREL BISULFATE SCH MG: 75 TABLET ORAL at 08:46

## 2019-09-02 RX ADMIN — ACETAMINOPHEN SCH MG: 500 TABLET ORAL at 08:46

## 2019-09-02 RX ADMIN — MENTHOL, METHYL SALICYLATE SCH DOSE: 10; 15 CREAM TOPICAL at 08:47

## 2019-09-02 RX ADMIN — ATORVASTATIN CALCIUM SCH MG: 20 TABLET, FILM COATED ORAL at 08:46

## 2019-09-02 RX ADMIN — LISINOPRIL SCH MG: 10 TABLET ORAL at 21:46

## 2019-09-02 RX ADMIN — ACETAMINOPHEN SCH MG: 500 TABLET ORAL at 15:28

## 2019-09-02 RX ADMIN — PANTOPRAZOLE SODIUM SCH MG: 40 TABLET, DELAYED RELEASE ORAL at 08:46

## 2019-09-02 RX ADMIN — ASPIRIN 81 MG CHEWABLE TABLET SCH MG: 81 TABLET CHEWABLE at 08:46

## 2019-09-02 RX ADMIN — SENNOSIDES SCH TAB: 8.6 TABLET, FILM COATED ORAL at 21:46

## 2019-09-02 RX ADMIN — NYSTATIN SCH DOSE: 100000 POWDER TOPICAL at 21:47

## 2019-09-02 RX ADMIN — INSULIN LISPRO SCH UNITS: 100 INJECTION, SOLUTION INTRAVENOUS; SUBCUTANEOUS at 12:14

## 2019-09-02 RX ADMIN — IPRATROPIUM BROMIDE AND ALBUTEROL SULFATE SCH ML: .5; 3 SOLUTION RESPIRATORY (INHALATION) at 20:00

## 2019-09-03 VITALS — DIASTOLIC BLOOD PRESSURE: 64 MMHG | SYSTOLIC BLOOD PRESSURE: 114 MMHG

## 2019-09-03 VITALS — DIASTOLIC BLOOD PRESSURE: 61 MMHG | SYSTOLIC BLOOD PRESSURE: 102 MMHG

## 2019-09-03 LAB
HCT VFR BLD AUTO: 46.6 % (ref 36–47)
HGB BLD-MCNC: 15.7 G/DL (ref 12–15.5)
MCH RBC QN AUTO: 29.1 PG (ref 27–33)
MCHC RBC AUTO-ENTMCNC: 33.7 G/DL (ref 32–36.5)
MCV RBC AUTO: 86.5 FL (ref 80–96)
PLATELET # BLD AUTO: 304 10^3/UL (ref 150–450)
RBC # BLD AUTO: 5.39 10^6/UL (ref 4–5.4)
WBC # BLD AUTO: 8.3 10^3/UL (ref 4–10)

## 2019-09-03 RX ADMIN — INSULIN LISPRO SCH UNITS: 100 INJECTION, SOLUTION INTRAVENOUS; SUBCUTANEOUS at 12:44

## 2019-09-03 RX ADMIN — DOCUSATE SODIUM SCH MG: 100 CAPSULE, LIQUID FILLED ORAL at 08:35

## 2019-09-03 RX ADMIN — ATORVASTATIN CALCIUM SCH MG: 20 TABLET, FILM COATED ORAL at 08:35

## 2019-09-03 RX ADMIN — INSULIN LISPRO SCH UNITS: 100 INJECTION, SOLUTION INTRAVENOUS; SUBCUTANEOUS at 08:33

## 2019-09-03 RX ADMIN — MENTHOL, METHYL SALICYLATE SCH DOSE: 10; 15 CREAM TOPICAL at 17:31

## 2019-09-03 RX ADMIN — IPRATROPIUM BROMIDE AND ALBUTEROL SULFATE SCH ML: .5; 3 SOLUTION RESPIRATORY (INHALATION) at 07:47

## 2019-09-03 RX ADMIN — ENOXAPARIN SODIUM SCH MG: 40 INJECTION SUBCUTANEOUS at 08:34

## 2019-09-03 RX ADMIN — IPRATROPIUM BROMIDE AND ALBUTEROL SULFATE SCH ML: .5; 3 SOLUTION RESPIRATORY (INHALATION) at 20:57

## 2019-09-03 RX ADMIN — BUPROPION HYDROCHLORIDE SCH MG: 150 TABLET, FILM COATED, EXTENDED RELEASE ORAL at 08:35

## 2019-09-03 RX ADMIN — ACETAMINOPHEN SCH MG: 500 TABLET ORAL at 21:05

## 2019-09-03 RX ADMIN — ASPIRIN 81 MG CHEWABLE TABLET SCH MG: 81 TABLET CHEWABLE at 08:35

## 2019-09-03 RX ADMIN — LIDOCAINE SCH PATCH: 50 PATCH CUTANEOUS at 21:06

## 2019-09-03 RX ADMIN — NYSTATIN SCH DOSE: 100000 POWDER TOPICAL at 21:07

## 2019-09-03 RX ADMIN — DOCUSATE SODIUM SCH MG: 100 CAPSULE, LIQUID FILLED ORAL at 21:05

## 2019-09-03 RX ADMIN — Medication SCH: at 08:36

## 2019-09-03 RX ADMIN — LISINOPRIL SCH MG: 10 TABLET ORAL at 21:05

## 2019-09-03 RX ADMIN — FLUTICASONE PROPIONATE SCH SPRAY: 50 SPRAY, METERED NASAL at 21:07

## 2019-09-03 RX ADMIN — FLUTICASONE PROPIONATE SCH SPRAY: 50 SPRAY, METERED NASAL at 10:06

## 2019-09-03 RX ADMIN — LATANOPROST SCH DROP: 50 SOLUTION OPHTHALMIC at 21:07

## 2019-09-03 RX ADMIN — ACETAMINOPHEN SCH MG: 500 TABLET ORAL at 08:35

## 2019-09-03 RX ADMIN — MENTHOL, METHYL SALICYLATE SCH DOSE: 10; 15 CREAM TOPICAL at 21:06

## 2019-09-03 RX ADMIN — ACETAMINOPHEN SCH MG: 500 TABLET ORAL at 17:29

## 2019-09-03 RX ADMIN — PANTOPRAZOLE SODIUM SCH MG: 40 TABLET, DELAYED RELEASE ORAL at 08:35

## 2019-09-03 RX ADMIN — INSULIN LISPRO SCH UNITS: 100 INJECTION, SOLUTION INTRAVENOUS; SUBCUTANEOUS at 17:32

## 2019-09-03 RX ADMIN — INSULIN LISPRO SCH UNITS: 100 INJECTION, SOLUTION INTRAVENOUS; SUBCUTANEOUS at 21:00

## 2019-09-03 RX ADMIN — MENTHOL, METHYL SALICYLATE SCH DOSE: 10; 15 CREAM TOPICAL at 10:05

## 2019-09-03 RX ADMIN — NYSTATIN SCH DOSE: 100000 POWDER TOPICAL at 10:05

## 2019-09-03 RX ADMIN — SENNOSIDES SCH TAB: 8.6 TABLET, FILM COATED ORAL at 21:05

## 2019-09-03 RX ADMIN — CLOPIDOGREL BISULFATE SCH MG: 75 TABLET ORAL at 08:35

## 2019-09-03 NOTE — IPNPDOC
PM&R Progress Note


DATE OF SERVICE:  Sep 3, 2019


Physiatrist Progress Note


Subjective:


Patient stating she thinks she can go home with her sister who found housing in 

[place]. She reports her back pain is helped with oxycodone, but she feels seh

only needs it right before PT and OT.





REVIEW OF SYSTEMS: The following is a completed review of systems and has been 

reviewed. Review of systems otherwise unremarkable.


PAIN: Patient self reports no pain


EYES: No recent vision changes


EARS, NOSE, & THROAT: +dysphagia


CARDIOVASCULAR: Denies chest pain or palpitations


PULMONARY: Denies shortness of breath, except with exertion


GASTROINTESTINAL: Denies constipation/diarrhea


GENITOURINARY: denies dysuria


MUSCULOSKELETAL: LUE weakness and LLE weakness


NEUROLOGICAL: left sided paresis, facial droop


SKIN: no rash


PSYCHIATRIC: +schizophrenia


All other review of systems found to be negative.








PHYSICAL EXAMINATION:


VITAL SIGNS: Please see below.


GENERAL: Pleasant and cooperative. No acute distress. obese


HEENT: PERRL. Extraocular movements intact. Clear conjunctiva, left sided facial

droop


CARDIOVASCULAR: Regular rate and rhythm. No murmurs, rubs, or gallops


LUNGS: decreased breath sounds, no wheezes appreciated


ABDOMEN: Soft, nontender, nondistended. Positive bowel sounds. Normal active 

bowel sounds


NEUROLOGICAL: Alert and oriented times three. Cranial nerves II through XII 

grossly intact except left facial droop. Sensation grossly intact  to light 

touch all 4 extremities


equivocal babinksi bilat, no clonus


EXTREMITIES: 5\5 strength right upper extremities. flaccid LUE, 5\5 strength 

right lower extremity. 4/5 strength in left lower extremity. 





SKIN:keratotic, RLQ of her abdomen with ecchymosis, no induration, non-TTP








ASSESSMENT:54-year-old F with past medical history of obesity, HTN, who presents

status post stroke.





PLAN:


1. Rehab: PT- strengthen/stretch/maintain ROM bilat LE, improve gait and 

endurance, family training- poor trunk control


OT-strengthen/stretch/maintain ROM bilat UE, improve trunk control and ADL manag

ement


SLP- significant dysphagia, c/u puree diet with upgraded to nectar thickened  

liquids


2. Neuro: recent CVA with new onset stroke in the bilateral basal ganglia and 

corona radiata- c/u ASA and Plavix, c/u statin, f/u with [facility]


-proza  qhs for motor recovery


3. Cardio: pmh HTN, c/u lisinopril and HCTZ- medicine consulted to assist in 

management


4. Resp: heavy smoker, c/u Duonebs, GIULIANO c/u CPAP, monitor pulse-ox and for 

infection


-CXR 8/23/19, no infiltrate


5. Psych- pmh schizophrenia c/u Wellbutryn


6. DVT ppx: lovenox and TEDs


7. GI ppx- protonix


8. : monitor PVRs


9. Pain: lidoderm patch to low back qHS, c/u standing tylenol, change oxycodone 

to standing 10 am and 1pm, then q12h prn 


10. Skin: abdominal ecchymosis likely due transfers, will monitor- Hgb stable


9. Dispo: 9/17/19 to home, progressing slowly towards goals


Allergies


Coded Allergies:  


     No Known Allergies (Verified , 8/26/04)





Vital Signs





Vital Signs








  Date Time  Temp Pulse Resp B/P (MAP) Pulse Ox O2 Delivery O2 Flow Rate FiO2


 


9/3/19 10:03   20     


 


9/3/19 05:30 96.7 73  102/61 (75) 96   











Laboratory Data


CBC/BMP


Laboratory Tests


9/3/19 06:51








Red Blood Count 5.39, Mean Corpuscular Volume 86.5, Mean Corpuscular Hemoglobin 

29.1, Mean Corpuscular Hemoglobin Concent 33.7, Red Cell Distribution Width 13.3


Labs 24H


Laboratory Tests 2


9/2/19 16:42: Bedside Glucose (Misc Panel) 146H


9/2/19 19:47: Bedside Glucose (Misc Panel) 200H


9/3/19 05:59: Bedside Glucose (Misc Panel) 219H


9/3/19 06:51: Nucleated Red Blood Cells % (auto) 0.0


9/3/19 12:05: Bedside Glucose (Misc Panel) 237H





Microbiology





Microbiology


8/25/19 Urine Culture - Final, Complete





Current Medications


Current Medications





Current Medications








 Medications


  (Trade)  Dose


 Ordered  Sig/Rose


 Route


 PRN Reason  Start Time


 Stop Time Status Last Admin


Dose Admin


 


 Acetaminophen


  (Tylenol Tab)  650 mg  Q4HP  PRN


 PO


 fever/MILD PAIN (PS 1-4)  8/22/19 20:15


 8/29/19 11:26 DC 8/28/19 08:30





 


 Acetaminophen


  (Tylenol Tab)  1,000 mg  TID


 PO


   8/29/19 16:00


    9/3/19 08:35





 


 Albuterol/


 Ipratropium


  (Duoneb (Ipr


 0.5mg/Alb 2.5mg))  3 ml  RBID


 NEB


   8/23/19 08:00


    9/3/19 07:47





 


 Albuterol/


 Ipratropium


  (Duoneb (Ipr


 0.5mg/Alb 2.5mg))  3 ml  RQ8H  PRN


 NEB


 WHEEZING  8/22/19 20:15


     





 


 Aspirin


  (Aspirin


 Chewable)  81 mg  DAILY


 PO


   8/23/19 09:00


    9/3/19 08:35





 


 Atorvastatin


 Calcium


  (Lipitor)  80 mg  DAILY


 PO


   8/23/19 09:00


    9/3/19 08:35





 


 Bisacodyl


  (Dulcolax


 Suppository)  10 mg  DAILYPRN  PRN


 AK


 CONSTIPATION  8/22/19 20:15


     





 


 Bupropion HCl


  (Wellbutrin Xl)  150 mg  QAM


 PO


   8/23/19 09:00


    9/3/19 08:35





 


 Clopidogrel


 Bisulfate


  (PLAVix)  75 mg  DAILY


 PO


   8/23/19 09:00


    9/3/19 08:35





 


 Dextrose


  (Dextrose 50%)  25 ml  ASDIRECTED  PRN


 IV


 SEE LABEL COMMENTS  8/22/19 20:15


     





 


 Docusate Sodium


  (Colace)  100 mg  BID


 PO


   8/22/19 21:00


    9/3/19 08:35





 


 Enoxaparin Sodium


  (Lovenox)  40 mg  DAILY


 SC


   8/23/19 09:00


    9/3/19 08:34





 


 Fluoxetine HCl


  (PROzac)  20 mg  QHS


 PO


   8/23/19 21:00


    9/2/19 21:46





 


 Fluticasone


 Propionate


  (Flonase 0.05%


 Nasal Spray)  1 spray  BID


 NARES


   8/22/19 21:00


    9/3/19 10:06





 


 Glucagon


  (Glucagon)  1 mg  ASDIRECTED  PRN


 SC


 SEE LABEL COMMENTS  8/22/19 20:15


     





 


 Glucose


  (Glucose)  16 GM  ASDIRECTED  PRN


 PO


 SEE LABEL COMMENTS  8/22/19 20:15


     





 


 Guaifenesin


  (Robitussin Tab)  400 mg  TID


 PO


   8/23/19 16:00


    9/3/19 08:34





 


 Home Med


  (Med Rec


 Complete!)    ASDIRECTED


 XX


   8/22/19 21:15


 8/22/19 21:15 DC  





 


 Hydrochlorothiazide


  (Hydrodiuril)  25 mg  DAILY


 PO


   8/23/19 09:00


    9/2/19 08:46





 


 Insulin Human


 Lispro


  (HumaLOG INSULIN)  SEE


 PROTOCOL


 TABLE  AC


 SC


   8/23/19 07:30


    9/3/19 12:44





 


 Insulin Human


 Lispro


  (HumaLOG INSULIN)  SEE


 PROTOCOL


 TABLE  QHS


 SC


   8/22/19 21:00


     





 


 Latanoprost


  (Xalatan 0.005%


 Op Soln)  1 drop  QHS


 OU


   8/22/19 21:00


    9/2/19 21:46





 


 Lidocaine


  (Lidoderm Patch)  2 patch  QHS


 TD


   8/28/19 21:00


    9/2/19 21:45





 


 Lisinopril


  (Prinivil)  10 mg  QHS


 PO


   8/23/19 21:00


    9/2/19 21:46





 


 Magnesium


 Hydroxide


  (Milk Of


 Magnesia)  30 ml  DAILYPRN  PRN


 PO


 CONSTIPATION  8/22/19 20:15


     





 


 Menthol/Methyl


 Salicylate


  (Bengay Cream)  1 dose  TID


 TOP


   8/30/19 09:00


    9/3/19 10:05





 


 Non-Formulary


 Medication


  (** See Comment


 Field Below **)  REMOVE


 LIDODERM


 PATCH  DAILY@0900


 XX


   8/29/19 09:00


    9/3/19 08:36





 


 Nystatin


  (Mycostatin


 Powder, Nystop)    BID


 TOP


   8/25/19 21:00


    9/3/19 10:05





 


 Oxycodone HCl


  (Roxicodone,


 Oxyir)  5 mg  Q4HP  PRN


 PO


 PAIN  8/30/19 15:00


    9/3/19 10:03





 


 Pantoprazole


 Sodium


  (Protonix)  40 mg  DAILY


 PO


   8/23/19 09:00


    9/3/19 08:35





 


 Senna


  (Senokot)  1 tab  QHS


 PO


   8/22/19 21:00


    9/2/19 21:46




















JASWANT ALFARO MD          Sep 3, 2019 15:57

## 2019-09-04 VITALS — DIASTOLIC BLOOD PRESSURE: 56 MMHG | SYSTOLIC BLOOD PRESSURE: 116 MMHG

## 2019-09-04 VITALS — DIASTOLIC BLOOD PRESSURE: 80 MMHG | SYSTOLIC BLOOD PRESSURE: 142 MMHG

## 2019-09-04 VITALS — SYSTOLIC BLOOD PRESSURE: 123 MMHG | DIASTOLIC BLOOD PRESSURE: 67 MMHG

## 2019-09-04 LAB
BUN SERPL-MCNC: 21 MG/DL (ref 7–18)
CALCIUM SERPL-MCNC: 9.9 MG/DL (ref 8.5–10.1)
CHLORIDE SERPL-SCNC: 98 MEQ/L (ref 98–107)
CO2 SERPL-SCNC: 32 MEQ/L (ref 21–32)
CREAT SERPL-MCNC: 0.82 MG/DL (ref 0.55–1.3)
GFR SERPL CREATININE-BSD FRML MDRD: > 60 ML/MIN/{1.73_M2} (ref 51–?)
GLUCOSE SERPL-MCNC: 179 MG/DL (ref 70–100)
POTASSIUM SERPL-SCNC: 4.3 MEQ/L (ref 3.5–5.1)
SODIUM SERPL-SCNC: 134 MEQ/L (ref 136–145)

## 2019-09-04 RX ADMIN — INSULIN LISPRO SCH UNITS: 100 INJECTION, SOLUTION INTRAVENOUS; SUBCUTANEOUS at 17:01

## 2019-09-04 RX ADMIN — IPRATROPIUM BROMIDE AND ALBUTEROL SULFATE SCH ML: .5; 3 SOLUTION RESPIRATORY (INHALATION) at 21:49

## 2019-09-04 RX ADMIN — NYSTATIN SCH DOSE: 100000 POWDER TOPICAL at 21:14

## 2019-09-04 RX ADMIN — ACETAMINOPHEN SCH MG: 500 TABLET ORAL at 21:12

## 2019-09-04 RX ADMIN — MENTHOL, METHYL SALICYLATE SCH DOSE: 10; 15 CREAM TOPICAL at 08:26

## 2019-09-04 RX ADMIN — MENTHOL, METHYL SALICYLATE SCH DOSE: 10; 15 CREAM TOPICAL at 21:00

## 2019-09-04 RX ADMIN — LIDOCAINE SCH PATCH: 50 PATCH CUTANEOUS at 21:13

## 2019-09-04 RX ADMIN — INSULIN LISPRO SCH UNITS: 100 INJECTION, SOLUTION INTRAVENOUS; SUBCUTANEOUS at 21:00

## 2019-09-04 RX ADMIN — INSULIN LISPRO SCH UNITS: 100 INJECTION, SOLUTION INTRAVENOUS; SUBCUTANEOUS at 13:00

## 2019-09-04 RX ADMIN — PANTOPRAZOLE SODIUM SCH MG: 40 TABLET, DELAYED RELEASE ORAL at 08:23

## 2019-09-04 RX ADMIN — ATORVASTATIN CALCIUM SCH MG: 20 TABLET, FILM COATED ORAL at 08:24

## 2019-09-04 RX ADMIN — BUPROPION HYDROCHLORIDE SCH MG: 150 TABLET, FILM COATED, EXTENDED RELEASE ORAL at 08:24

## 2019-09-04 RX ADMIN — ACETAMINOPHEN SCH MG: 500 TABLET ORAL at 08:23

## 2019-09-04 RX ADMIN — IPRATROPIUM BROMIDE AND ALBUTEROL SULFATE SCH ML: .5; 3 SOLUTION RESPIRATORY (INHALATION) at 21:46

## 2019-09-04 RX ADMIN — Medication SCH: at 08:27

## 2019-09-04 RX ADMIN — ENOXAPARIN SODIUM SCH MG: 40 INJECTION SUBCUTANEOUS at 08:22

## 2019-09-04 RX ADMIN — NYSTATIN SCH DOSE: 100000 POWDER TOPICAL at 08:26

## 2019-09-04 RX ADMIN — CLOPIDOGREL BISULFATE SCH MG: 75 TABLET ORAL at 08:22

## 2019-09-04 RX ADMIN — ACETAMINOPHEN SCH MG: 500 TABLET ORAL at 17:02

## 2019-09-04 RX ADMIN — LISINOPRIL SCH MG: 10 TABLET ORAL at 21:12

## 2019-09-04 RX ADMIN — SENNOSIDES SCH TAB: 8.6 TABLET, FILM COATED ORAL at 21:12

## 2019-09-04 RX ADMIN — IPRATROPIUM BROMIDE AND ALBUTEROL SULFATE SCH ML: .5; 3 SOLUTION RESPIRATORY (INHALATION) at 07:38

## 2019-09-04 RX ADMIN — FLUTICASONE PROPIONATE SCH SPRAY: 50 SPRAY, METERED NASAL at 08:26

## 2019-09-04 RX ADMIN — FLUTICASONE PROPIONATE SCH SPRAY: 50 SPRAY, METERED NASAL at 21:14

## 2019-09-04 RX ADMIN — DOCUSATE SODIUM SCH MG: 100 CAPSULE, LIQUID FILLED ORAL at 08:22

## 2019-09-04 RX ADMIN — ASPIRIN 81 MG CHEWABLE TABLET SCH MG: 81 TABLET CHEWABLE at 08:24

## 2019-09-04 RX ADMIN — DOCUSATE SODIUM SCH MG: 100 CAPSULE, LIQUID FILLED ORAL at 21:11

## 2019-09-04 RX ADMIN — MENTHOL, METHYL SALICYLATE SCH DOSE: 10; 15 CREAM TOPICAL at 16:00

## 2019-09-04 RX ADMIN — INSULIN LISPRO SCH UNITS: 100 INJECTION, SOLUTION INTRAVENOUS; SUBCUTANEOUS at 08:22

## 2019-09-04 RX ADMIN — LATANOPROST SCH DROP: 50 SOLUTION OPHTHALMIC at 21:14

## 2019-09-04 NOTE — IPNPDOC
PM&R Progress Note


DATE OF SERVICE:  Sep 4, 2019


Physiatrist Progress Note


Subjective:


Patient seen in her room, stating the oxycodone does help her back pain. She 

says she is feeling well overall and denies any fevers, chills, dysuria.





REVIEW OF SYSTEMS: The following is a completed review of systems and has been 

reviewed. Review of systems otherwise unremarkable.


PAIN: Patient self reports no pain


EYES: No recent vision changes


EARS, NOSE, & THROAT: +dysphagia


CARDIOVASCULAR: Denies chest pain or palpitations


PULMONARY: Denies shortness of breath, except with exertion


GASTROINTESTINAL: Denies constipation/diarrhea


GENITOURINARY: denies dysuria


MUSCULOSKELETAL: LUE weakness and LLE weakness


NEUROLOGICAL: left sided paresis, facial droop


SKIN: no rash


PSYCHIATRIC: +schizophrenia


All other review of systems found to be negative.








PHYSICAL EXAMINATION:


VITAL SIGNS: Please see below.


GENERAL: Pleasant and cooperative. No acute distress. obese


HEENT: PERRL. Extraocular movements intact. Clear conjunctiva, left sided facial

droop


CARDIOVASCULAR: Regular rate and rhythm. No murmurs, rubs, or gallops


LUNGS: decreased breath sounds, no wheezes appreciated


ABDOMEN: Soft, nontender, nondistended. Positive bowel sounds. Normal active 

bowel sounds


NEUROLOGICAL: Alert and oriented times three. Cranial nerves II through XII 

grossly intact except left facial droop. Sensation grossly intact  to light 

touch all 4 extremities


equivocal babinksi bilat, no clonus


EXTREMITIES: 5\5 strength right upper extremities. flaccid LUE, 5\5 strength 

right lower extremity. 4/5 strength in left lower extremity. 





SKIN:keratotic, RLQ of her abdomen with ecchymosis, no induration, non-TTP








ASSESSMENT:54-year-old F with past medical history of obesity, HTN, who presents

status post stroke.





PLAN:


1. Rehab: PT- strengthen/stretch/maintain ROM bilat LE, improve gait and 

endurance, family training- trunk control improving 


OT-strengthen/stretch/maintain ROM bilat UE, improve trunk control and ADL 

management


SLP- significant dysphagia, c/u puree diet with upgraded to nectar thickened  

liquids


2. Neuro: recent CVA with new onset stroke in the bilateral basal ganglia and 

corona radiata- c/u ASA and Plavix, c/u statin, f/u with [facility] stroke clinic


-[facility]  qhs for motor recovery


3. Cardio: pmh HTN, c/u lisinopril and HCTZ- medicine consulted to assist in 

management


4. Resp: heavy smoker, c/u Duonebs, GIULIANO c/u CPAP, monitor pulse-ox and for in

fection


-CXR 8/23/19, no infiltrate


5. Psych- pmh schizophrenia c/u Wellbutryn


6. DVT ppx: lovenox and TEDs, will order Dopplers today


7. GI ppx- protonix


8. : monitor PVRs


9. Pain: lidoderm patch to low back qHS, c/u standing tylenol, change oxycodone 

to standing 10 am and 1pm, then q12h prn 


10. Skin: abdominal ecchymosis likely due transfers, will monitor- Hgb stable


9. Dispo: 9/17/19 to home, progressing slowly towards goals


Allergies


Coded Allergies:  


     No Known Allergies (Verified , 8/26/04)





Vital Signs





Vital Signs








  Date Time  Temp Pulse Resp B/P (MAP) Pulse Ox O2 Delivery O2 Flow Rate FiO2


 


9/4/19 10:44   20     


 


9/4/19 06:00 97.2 59  116/56 (76) 91   











Laboratory Data


CBC/BMP


Laboratory Tests


9/4/19 07:06








Calcium Level 9.9


Labs 24H


Laboratory Tests 2


9/3/19 17:02: Bedside Glucose (Misc Panel) 207H


9/3/19 19:40: Bedside Glucose (Misc Panel) 201H


9/4/19 07:06: 


Anion Gap 4L, Glomerular Filtration Rate > 60.0, Blood Urea Nitrogen 21H, 

Creatinine 0.82, Sodium Level 134L, Potassium Level 4.3, Chloride Level 98, 

Carbon Dioxide Level 32, Calcium Level 9.9


9/4/19 12:13: Bedside Glucose (Misc Panel) 150H





Microbiology





Microbiology


8/25/19 Urine Culture - Final, Complete





Current Medications


Current Medications





Current Medications








 Medications


  (Trade)  Dose


 Ordered  Sig/Rose


 Route


 PRN Reason  Start Time


 Stop Time Status Last Admin


Dose Admin


 


 Acetaminophen


  (Tylenol Tab)  650 mg  Q4HP  PRN


 PO


 fever/MILD PAIN (PS 1-4)  8/22/19 20:15


 8/29/19 11:26 DC 8/28/19 08:30





 


 Acetaminophen


  (Tylenol Tab)  1,000 mg  TID


 PO


   8/29/19 16:00


    9/4/19 08:23





 


 Albuterol/


 Ipratropium


  (Duoneb (Ipr


 0.5mg/Alb 2.5mg))  3 ml  RBID


 NEB


   8/23/19 08:00


    9/3/19 20:57





 


 Albuterol/


 Ipratropium


  (Duoneb (Ipr


 0.5mg/Alb 2.5mg))  3 ml  RQ8H  PRN


 NEB


 WHEEZING  8/22/19 20:15


     





 


 Aspirin


  (Aspirin


 Chewable)  81 mg  DAILY


 PO


   8/23/19 09:00


    9/4/19 08:24





 


 Atorvastatin


 Calcium


  (Lipitor)  80 mg  DAILY


 PO


   8/23/19 09:00


    9/4/19 08:24





 


 Bisacodyl


  (Dulcolax


 Suppository)  10 mg  DAILYPRN  PRN


 [place]


 CONSTIPATION  8/22/19 20:15


     





 


 Bupropion HCl


  (Wellbutrin Xl)  150 mg  QAM


 PO


   8/23/19 09:00


    9/4/19 08:24





 


 Clopidogrel


 Bisulfate


  (PLAVix)  75 mg  DAILY


 PO


   8/23/19 09:00


    9/4/19 08:22





 


 Dextrose


  (Dextrose 50%)  25 ml  ASDIRECTED  PRN


 IV


 SEE LABEL COMMENTS  8/22/19 20:15


     





 


 Docusate Sodium


  (Colace)  100 mg  BID


 PO


   8/22/19 21:00


    9/4/19 08:22





 


 Enoxaparin Sodium


  (Lovenox)  40 mg  DAILY


 SC


   8/23/19 09:00


    9/4/19 08:22





 


 Fluoxetine HCl


  (PROzac)  20 mg  QHS


 PO


   8/23/19 21:00


    9/3/19 21:05





 


 Fluticasone


 Propionate


  (Flonase 0.05%


 Nasal Spray)  1 spray  BID


 NARES


   8/22/19 21:00


    9/4/19 08:26





 


 Glucagon


  (Glucagon)  1 mg  ASDIRECTED  PRN


 SC


 SEE LABEL COMMENTS  8/22/19 20:15


     





 


 Glucose


  (Glucose)  16 GM  ASDIRECTED  PRN


 PO


 SEE LABEL COMMENTS  8/22/19 20:15


     





 


 Guaifenesin


  (Robitussin Tab)  400 mg  TID


 PO


   8/23/19 16:00


    9/4/19 08:23





 


 Home Med


  (Med Rec


 Complete!)    ASDIRECTED


 XX


   8/22/19 21:15


 8/22/19 21:15 DC  





 


 Hydrochlorothiazide


  (Hydrodiuril)  25 mg  DAILY


 PO


   8/23/19 09:00


    9/4/19 08:23





 


 Insulin Human


 Lispro


  (HumaLOG INSULIN)  SEE


 PROTOCOL


 TABLE  AC


 SC


   8/23/19 07:30


    9/4/19 08:22





 


 Insulin Human


 Lispro


  (HumaLOG INSULIN)  SEE


 PROTOCOL


 TABLE  QHS


 SC


   8/22/19 21:00


     





 


 Latanoprost


  (Xalatan 0.005%


 Op Soln)  1 drop  QHS


 OU


   8/22/19 21:00


    9/3/19 21:07





 


 Lidocaine


  (Lidoderm Patch)  2 patch  QHS


 TD


   8/28/19 21:00


    9/3/19 21:06





 


 Lisinopril


  (Prinivil)  10 mg  QHS


 PO


   8/23/19 21:00


    9/3/19 21:05





 


 Magnesium


 Hydroxide


  (Milk Of


 Magnesia)  30 ml  DAILYPRN  PRN


 PO


 CONSTIPATION  8/22/19 20:15


     





 


 Menthol/Methyl


 Salicylate


  (Bengay Cream)  1 dose  TID


 TOP


   8/30/19 09:00


    9/4/19 08:26





 


 Non-Formulary


 Medication


  (** See Comment


 Field Below **)  REMOVE


 LIDODERM


 PATCH  DAILY@0900


 XX


   8/29/19 09:00


    9/4/19 08:27





 


 Nystatin


  (Mycostatin


 Powder, Nystop)    BID


 TOP


   8/25/19 21:00


    9/4/19 08:26





 


 Oxycodone HCl


  (Roxicodone,


 Oxyir)  5 mg  BID@1000,1300


 PO


   9/4/19 10:00


    9/4/19 10:44





 


 Oxycodone HCl


  (Roxicodone,


 Oxyir)  5 mg  Q4HP  PRN


 PO


 PAIN  8/30/19 15:00


    9/4/19 08:25





 


 Pantoprazole


 Sodium


  (Protonix)  40 mg  DAILY


 PO


   8/23/19 09:00


    9/4/19 08:23





 


 Senna


  (Senokot)  1 tab  QHS


 PO


   8/22/19 21:00


    9/3/19 21:05




















JASWANT ALFARO MD          Sep 4, 2019 12:20

## 2019-09-04 NOTE — REP
Duplex extremity venous ultrasound: Bilateral lower extremities.

 

History:  Immobility.  Evaluate for DVT.

 

Findings:  The deep veins are anechoic and fully compressible from the groin to

the popliteal fossa in the left and right lower extremity.  Color flow imaging is

homogeneous.  Spectral Doppler interrogation demonstrates intact respiratory

variation in flow and normal manual augmentation of flow.  There is no evidence

of deep vein thrombosis.

 

Impression:

 

Negative bilateral lower extremity duplex venous ultrasound.  No evidence of deep

vein thrombosis.

 

 

Electronically Signed by

Andre Garcia MD 09/04/2019 01:46 P

## 2019-09-05 VITALS — SYSTOLIC BLOOD PRESSURE: 109 MMHG | DIASTOLIC BLOOD PRESSURE: 60 MMHG

## 2019-09-05 VITALS — SYSTOLIC BLOOD PRESSURE: 111 MMHG | DIASTOLIC BLOOD PRESSURE: 67 MMHG

## 2019-09-05 VITALS — DIASTOLIC BLOOD PRESSURE: 80 MMHG | SYSTOLIC BLOOD PRESSURE: 143 MMHG

## 2019-09-05 RX ADMIN — CLOPIDOGREL BISULFATE SCH MG: 75 TABLET ORAL at 08:25

## 2019-09-05 RX ADMIN — SENNOSIDES SCH TAB: 8.6 TABLET, FILM COATED ORAL at 20:39

## 2019-09-05 RX ADMIN — DOCUSATE SODIUM SCH MG: 100 CAPSULE, LIQUID FILLED ORAL at 20:40

## 2019-09-05 RX ADMIN — TOLTERODINE SCH MG: 2 CAPSULE, EXTENDED RELEASE ORAL at 15:12

## 2019-09-05 RX ADMIN — IPRATROPIUM BROMIDE AND ALBUTEROL SULFATE SCH ML: .5; 3 SOLUTION RESPIRATORY (INHALATION) at 20:00

## 2019-09-05 RX ADMIN — MENTHOL, METHYL SALICYLATE SCH DOSE: 10; 15 CREAM TOPICAL at 17:01

## 2019-09-05 RX ADMIN — FLUTICASONE PROPIONATE SCH SPRAY: 50 SPRAY, METERED NASAL at 09:56

## 2019-09-05 RX ADMIN — LISINOPRIL SCH MG: 10 TABLET ORAL at 20:39

## 2019-09-05 RX ADMIN — ASPIRIN 81 MG CHEWABLE TABLET SCH MG: 81 TABLET CHEWABLE at 08:25

## 2019-09-05 RX ADMIN — PROBIOTIC PRODUCT - TAB SCH EA: TAB at 16:59

## 2019-09-05 RX ADMIN — PROBIOTIC PRODUCT - TAB SCH EA: TAB at 10:16

## 2019-09-05 RX ADMIN — LATANOPROST SCH DROP: 50 SOLUTION OPHTHALMIC at 20:42

## 2019-09-05 RX ADMIN — INSULIN LISPRO SCH UNITS: 100 INJECTION, SOLUTION INTRAVENOUS; SUBCUTANEOUS at 12:25

## 2019-09-05 RX ADMIN — INSULIN LISPRO SCH UNITS: 100 INJECTION, SOLUTION INTRAVENOUS; SUBCUTANEOUS at 17:00

## 2019-09-05 RX ADMIN — ACETAMINOPHEN SCH MG: 500 TABLET ORAL at 08:25

## 2019-09-05 RX ADMIN — ATORVASTATIN CALCIUM SCH MG: 20 TABLET, FILM COATED ORAL at 08:26

## 2019-09-05 RX ADMIN — MENTHOL, METHYL SALICYLATE SCH DOSE: 10; 15 CREAM TOPICAL at 08:26

## 2019-09-05 RX ADMIN — PANTOPRAZOLE SODIUM SCH MG: 40 TABLET, DELAYED RELEASE ORAL at 08:26

## 2019-09-05 RX ADMIN — ACETAMINOPHEN SCH MG: 500 TABLET ORAL at 16:59

## 2019-09-05 RX ADMIN — MENTHOL, METHYL SALICYLATE SCH DOSE: 10; 15 CREAM TOPICAL at 20:46

## 2019-09-05 RX ADMIN — PROBIOTIC PRODUCT - TAB SCH EA: TAB at 20:39

## 2019-09-05 RX ADMIN — INSULIN LISPRO SCH UNITS: 100 INJECTION, SOLUTION INTRAVENOUS; SUBCUTANEOUS at 20:40

## 2019-09-05 RX ADMIN — FLUTICASONE PROPIONATE SCH SPRAY: 50 SPRAY, METERED NASAL at 20:41

## 2019-09-05 RX ADMIN — NYSTATIN SCH DOSE: 100000 POWDER TOPICAL at 08:27

## 2019-09-05 RX ADMIN — DOCUSATE SODIUM SCH MG: 100 CAPSULE, LIQUID FILLED ORAL at 08:26

## 2019-09-05 RX ADMIN — NYSTATIN SCH DOSE: 100000 POWDER TOPICAL at 21:00

## 2019-09-05 RX ADMIN — ENOXAPARIN SODIUM SCH MG: 40 INJECTION SUBCUTANEOUS at 08:26

## 2019-09-05 RX ADMIN — INSULIN LISPRO SCH UNITS: 100 INJECTION, SOLUTION INTRAVENOUS; SUBCUTANEOUS at 08:23

## 2019-09-05 RX ADMIN — Medication SCH: at 09:56

## 2019-09-05 RX ADMIN — IPRATROPIUM BROMIDE AND ALBUTEROL SULFATE SCH ML: .5; 3 SOLUTION RESPIRATORY (INHALATION) at 08:00

## 2019-09-05 RX ADMIN — BUPROPION HYDROCHLORIDE SCH MG: 150 TABLET, FILM COATED, EXTENDED RELEASE ORAL at 08:26

## 2019-09-05 RX ADMIN — ACETAMINOPHEN SCH MG: 500 TABLET ORAL at 20:40

## 2019-09-05 RX ADMIN — LIDOCAINE SCH PATCH: 50 PATCH CUTANEOUS at 20:38

## 2019-09-05 RX ADMIN — TOLTERODINE SCH MG: 2 CAPSULE, EXTENDED RELEASE ORAL at 22:31

## 2019-09-05 NOTE — IPNPDOC
PM&R Progress Note


DATE OF SERVICE:  Sep 5, 2019


Physiatrist Progress Note


Subjective:


Patient reporting she urinated every 10 minutes last night. She currently does 

not have low back pain.





REVIEW OF SYSTEMS: The following is a completed review of systems and has been 

reviewed. Review of systems otherwise unremarkable.


PAIN: Patient self reports no pain


EYES: No recent vision changes


EARS, NOSE, & THROAT: +dysphagia


CARDIOVASCULAR: Denies chest pain or palpitations


PULMONARY: Denies shortness of breath, except with exertion


GASTROINTESTINAL: Denies constipation/diarrhea


GENITOURINARY: denies dysuria


MUSCULOSKELETAL: LUE weakness and LLE weakness


NEUROLOGICAL: left sided paresis, facial droop


SKIN: no rash


PSYCHIATRIC: +schizophrenia


All other review of systems found to be negative.








PHYSICAL EXAMINATION:


VITAL SIGNS: Please see below.


GENERAL: Pleasant and cooperative. No acute distress. obese


HEENT: PERRL. Extraocular movements intact. Clear conjunctiva, left sided facial

droop


CARDIOVASCULAR: Regular rate and rhythm. No murmurs, rubs, or gallops


LUNGS: decreased breath sounds, no wheezes appreciated


ABDOMEN: Soft, nontender, nondistended. Positive bowel sounds. Normal active 

bowel sounds


NEUROLOGICAL: Alert and oriented times three. Cranial nerves II through XII 

grossly intact except left facial droop. Sensation grossly intact  to light 

touch all 4 extremities


equivocal babinksi bilat, no clonus


EXTREMITIES: 5\5 strength right upper extremities. flaccid LUE, 5\5 strength 

right lower extremity. 4/5 strength in left lower extremity. 





SKIN:keratotic, RLQ of her abdomen with ecchymosis, no induration, non-TTP








ASSESSMENT:54-year-old F with past medical history of obesity, HTN, who presents

status post stroke.





PLAN:


1. Rehab: PT- strengthen/stretch/maintain ROM bilat LE, improve gait and 

endurance, family training- trunk control improving 


OT-strengthen/stretch/maintain ROM bilat UE, improve trunk control and ADL 

management


SLP- significant dysphagia, c/u puree diet with upgraded to nectar thickened  

liquids


2. Neuro: recent CVA with new onset stroke in the bilateral basal ganglia and 

corona radiata- c/u ASA and Plavix, c/u statin, f/u with [facility] stroke clinic


-proza  q for motor recovery


3. Cardio: pmh HTN, c/u lisinopril and HCTZ- medicine consulted to assist in 

management


4. Resp: heavy smoker, c/u Duonebs, GIULIANO c/u CPAP, monitor pulse-ox and for 

infection


-CXR 8/23/19, no infiltrate


5. Psych- pmh schizophrenia c/u Wellbutryn


6. DVT ppx: lovenox and TEDs, will order Dopplers today


7. GI ppx- protonix


8. : monitor PVRs, patient with increased urination, will rehck UA, last Ucx c

ontaminated, will give one time dose Fosfomycin 


9. Pain: lidoderm patch to low back qHS, c/u standing tylenol, change oxycodone 

to standing 10 am and 1pm, then q12h prn 


10. Skin: abdominal ecchymosis likely due transfers, will monitor- Hgb stable


9. Dispo: 9/17/19 to home, progressing slowly towards goals


Allergies


Coded Allergies:  


     No Known Allergies (Verified , 8/26/04)





Vital Signs





Vital Signs








  Date Time  Temp Pulse Resp B/P (MAP) Pulse Ox O2 Delivery O2 Flow Rate FiO2


 


9/5/19 08:25   18     


 


9/5/19 04:00 96.7 83  143/80 (101) 92   











Laboratory Data


Labs 24H


Laboratory Tests 2


9/4/19 12:13: Bedside Glucose (Misc Panel) 150H


9/4/19 16:42: Bedside Glucose (Misc Panel) 151H


9/4/19 20:08: Bedside Glucose (Misc Panel) 187H


9/5/19 06:20: Bedside Glucose (Misc Panel) 166H





Current Medications


Current Medications





Current Medications








 Medications


  (Trade)  Dose


 Ordered  Sig/Rose


 Route


 PRN Reason  Start Time


 Stop Time Status Last Admin


Dose Admin


 


 Acetaminophen


  (Tylenol Tab)  650 mg  Q4HP  PRN


 PO


 fever/MILD PAIN (PS 1-4)  8/22/19 20:15


 8/29/19 11:26 DC 8/28/19 08:30





 


 Acetaminophen


  (Tylenol Tab)  1,000 mg  TID


 PO


   8/29/19 16:00


    9/5/19 08:25





 


 Albuterol/


 Ipratropium


  (Duoneb (Ipr


 0.5mg/Alb 2.5mg))  3 ml  RBID


 NEB


   8/23/19 08:00


    9/5/19 08:00





 


 Albuterol/


 Ipratropium


  (Duoneb (Ipr


 0.5mg/Alb 2.5mg))  3 ml  RQ8H  PRN


 NEB


 WHEEZING  8/22/19 20:15


     





 


 Aspirin


  (Aspirin


 Chewable)  81 mg  DAILY


 PO


   8/23/19 09:00


    9/5/19 08:25





 


 Atorvastatin


 Calcium


  (Lipitor)  80 mg  DAILY


 PO


   8/23/19 09:00


    9/5/19 08:26





 


 Bisacodyl


  (Dulcolax


 Suppository)  10 mg  DAILYPRN  PRN


 [place]


 CONSTIPATION  8/22/19 20:15


     





 


 Bupropion HCl


  (Wellbutrin Xl)  150 mg  QAM


 PO


   8/23/19 09:00


    9/5/19 08:26





 


 Clopidogrel


 Bisulfate


  (PLAVix)  75 mg  DAILY


 PO


   8/23/19 09:00


    9/5/19 08:25





 


 Dextrose


  (Dextrose 50%)  25 ml  ASDIRECTED  PRN


 IV


 SEE LABEL COMMENTS  8/22/19 20:15


     





 


 Docusate Sodium


  (Colace)  100 mg  BID


 PO


   8/22/19 21:00


    9/5/19 08:26





 


 Enoxaparin Sodium


  (Lovenox)  40 mg  DAILY


 SC


   8/23/19 09:00


    9/5/19 08:26





 


 Fluoxetine HCl


  (PROzac)  20 mg  QHS


 PO


   8/23/19 21:00


    9/4/19 21:12





 


 Fluticasone


 Propionate


  (Flonase 0.05%


 Nasal Spray)  1 spray  BID


 NARES


   8/22/19 21:00


    9/4/19 21:14





 


 Glucagon


  (Glucagon)  1 mg  ASDIRECTED  PRN


 SC


 SEE LABEL COMMENTS  8/22/19 20:15


     





 


 Glucose


  (Glucose)  16 GM  ASDIRECTED  PRN


 PO


 SEE LABEL COMMENTS  8/22/19 20:15


     





 


 Guaifenesin


  (Robitussin Tab)  400 mg  TID


 PO


   8/23/19 16:00


    9/4/19 21:11





 


 Home Med


  (Med Rec


 Complete!)    ASDIRECTED


 XX


   8/22/19 21:15


 8/22/19 21:15 DC  





 


 Hydrochlorothiazide


  (Hydrodiuril)  25 mg  DAILY


 PO


   8/23/19 09:00


    9/5/19 08:26





 


 Insulin Human


 Lispro


  (HumaLOG INSULIN)  SEE


 PROTOCOL


 TABLE  AC


 SC


   8/23/19 07:30


    9/5/19 08:23





 


 Insulin Human


 Lispro


  (HumaLOG INSULIN)  SEE


 PROTOCOL


 TABLE  QHS


 SC


   8/22/19 21:00


     





 


 Latanoprost


  (Xalatan 0.005%


 Op Soln)  1 drop  QHS


 OU


   8/22/19 21:00


    9/4/19 21:14





 


 Lidocaine


  (Lidoderm Patch)  2 patch  QHS


 TD


   8/28/19 21:00


    9/4/19 21:13





 


 Lisinopril


  (Prinivil)  10 mg  QHS


 PO


   8/23/19 21:00


    9/4/19 21:12





 


 Magnesium


 Hydroxide


  (Milk Of


 Magnesia)  30 ml  DAILYPRN  PRN


 PO


 CONSTIPATION  8/22/19 20:15


     





 


 Menthol/Methyl


 Salicylate


  (Bengay Cream)  1 dose  TID


 TOP


   8/30/19 09:00


    9/5/19 08:26





 


 Non-Formulary


 Medication


  (** See Comment


 Field Below **)  REMOVE


 LIDODERM


 PATCH  DAILY@0900


 XX


   8/29/19 09:00


    9/4/19 08:27





 


 Nystatin


  (Mycostatin


 Powder, Nystop)    BID


 TOP


   8/25/19 21:00


    9/5/19 08:27





 


 Oxycodone HCl


  (Roxicodone,


 Oxyir)  5 mg  BID@1000,1300


 PO


   9/4/19 10:00


    9/4/19 14:11





 


 Oxycodone HCl


  (Roxicodone,


 Oxyir)  5 mg  Q4HP  PRN


 PO


 PAIN  8/30/19 15:00


    9/5/19 08:25





 


 Pantoprazole


 Sodium


  (Protonix)  40 mg  DAILY


 PO


   8/23/19 09:00


    9/5/19 08:26





 


 Senna


  (Senokot)  1 tab  QHS


 PO


   8/22/19 21:00


    9/4/19 21:12




















JASWANT ALFARO MD          Sep 5, 2019 09:55

## 2019-09-06 VITALS — SYSTOLIC BLOOD PRESSURE: 145 MMHG | DIASTOLIC BLOOD PRESSURE: 80 MMHG

## 2019-09-06 VITALS — DIASTOLIC BLOOD PRESSURE: 62 MMHG | SYSTOLIC BLOOD PRESSURE: 130 MMHG

## 2019-09-06 VITALS — SYSTOLIC BLOOD PRESSURE: 107 MMHG | DIASTOLIC BLOOD PRESSURE: 57 MMHG

## 2019-09-06 RX ADMIN — BUPROPION HYDROCHLORIDE SCH MG: 150 TABLET, FILM COATED, EXTENDED RELEASE ORAL at 09:17

## 2019-09-06 RX ADMIN — IPRATROPIUM BROMIDE AND ALBUTEROL SULFATE SCH ML: .5; 3 SOLUTION RESPIRATORY (INHALATION) at 07:59

## 2019-09-06 RX ADMIN — MENTHOL, METHYL SALICYLATE SCH DOSE: 10; 15 CREAM TOPICAL at 21:00

## 2019-09-06 RX ADMIN — ACETAMINOPHEN SCH MG: 500 TABLET ORAL at 17:11

## 2019-09-06 RX ADMIN — MENTHOL, METHYL SALICYLATE SCH DOSE: 10; 15 CREAM TOPICAL at 09:15

## 2019-09-06 RX ADMIN — PROBIOTIC PRODUCT - TAB SCH EA: TAB at 17:11

## 2019-09-06 RX ADMIN — MENTHOL, METHYL SALICYLATE SCH DOSE: 10; 15 CREAM TOPICAL at 17:12

## 2019-09-06 RX ADMIN — ACETAMINOPHEN SCH MG: 500 TABLET ORAL at 21:21

## 2019-09-06 RX ADMIN — ENOXAPARIN SODIUM SCH MG: 40 INJECTION SUBCUTANEOUS at 09:16

## 2019-09-06 RX ADMIN — TOLTERODINE SCH MG: 2 CAPSULE, EXTENDED RELEASE ORAL at 09:18

## 2019-09-06 RX ADMIN — LATANOPROST SCH DROP: 50 SOLUTION OPHTHALMIC at 21:22

## 2019-09-06 RX ADMIN — IPRATROPIUM BROMIDE AND ALBUTEROL SULFATE SCH ML: .5; 3 SOLUTION RESPIRATORY (INHALATION) at 19:41

## 2019-09-06 RX ADMIN — ACETAMINOPHEN SCH MG: 500 TABLET ORAL at 09:17

## 2019-09-06 RX ADMIN — ASPIRIN 81 MG CHEWABLE TABLET SCH MG: 81 TABLET CHEWABLE at 09:16

## 2019-09-06 RX ADMIN — TOLTERODINE SCH MG: 2 CAPSULE, EXTENDED RELEASE ORAL at 21:21

## 2019-09-06 RX ADMIN — ATORVASTATIN CALCIUM SCH MG: 20 TABLET, FILM COATED ORAL at 09:17

## 2019-09-06 RX ADMIN — INSULIN LISPRO SCH UNITS: 100 INJECTION, SOLUTION INTRAVENOUS; SUBCUTANEOUS at 12:51

## 2019-09-06 RX ADMIN — NYSTATIN SCH DOSE: 100000 POWDER TOPICAL at 21:23

## 2019-09-06 RX ADMIN — FLUTICASONE PROPIONATE SCH SPRAY: 50 SPRAY, METERED NASAL at 21:22

## 2019-09-06 RX ADMIN — INSULIN LISPRO SCH UNITS: 100 INJECTION, SOLUTION INTRAVENOUS; SUBCUTANEOUS at 17:11

## 2019-09-06 RX ADMIN — PROBIOTIC PRODUCT - TAB SCH EA: TAB at 21:20

## 2019-09-06 RX ADMIN — INSULIN LISPRO SCH UNITS: 100 INJECTION, SOLUTION INTRAVENOUS; SUBCUTANEOUS at 09:16

## 2019-09-06 RX ADMIN — LISINOPRIL SCH MG: 10 TABLET ORAL at 21:20

## 2019-09-06 RX ADMIN — PANTOPRAZOLE SODIUM SCH MG: 40 TABLET, DELAYED RELEASE ORAL at 09:17

## 2019-09-06 RX ADMIN — Medication SCH: at 08:38

## 2019-09-06 RX ADMIN — LIDOCAINE SCH PATCH: 50 PATCH CUTANEOUS at 21:22

## 2019-09-06 RX ADMIN — CLOPIDOGREL BISULFATE SCH MG: 75 TABLET ORAL at 09:17

## 2019-09-06 RX ADMIN — NYSTATIN SCH DOSE: 100000 POWDER TOPICAL at 09:16

## 2019-09-06 RX ADMIN — DOCUSATE SODIUM SCH MG: 100 CAPSULE, LIQUID FILLED ORAL at 09:17

## 2019-09-06 RX ADMIN — FLUTICASONE PROPIONATE SCH SPRAY: 50 SPRAY, METERED NASAL at 09:15

## 2019-09-06 RX ADMIN — DOCUSATE SODIUM SCH MG: 100 CAPSULE, LIQUID FILLED ORAL at 21:23

## 2019-09-06 RX ADMIN — PROBIOTIC PRODUCT - TAB SCH EA: TAB at 09:17

## 2019-09-06 RX ADMIN — SENNOSIDES SCH TAB: 8.6 TABLET, FILM COATED ORAL at 21:20

## 2019-09-06 RX ADMIN — INSULIN LISPRO SCH UNITS: 100 INJECTION, SOLUTION INTRAVENOUS; SUBCUTANEOUS at 21:00

## 2019-09-06 NOTE — IPNPDOC
PM&R Progress Note


DATE OF SERVICE:  Sep 6, 2019


Physiatrist Progress Note


Subjective:


Patient reporting her urgency in urination is better today after taking 

Fosfomycin and starting Detrol.





REVIEW OF SYSTEMS: The following is a completed review of systems and has been 

reviewed. Review of systems otherwise unremarkable.


PAIN: Patient self reports no pain


EYES: No recent vision changes


EARS, NOSE, & THROAT: +dysphagia


CARDIOVASCULAR: Denies chest pain or palpitations


PULMONARY: Denies shortness of breath, except with exertion


GASTROINTESTINAL: Denies constipation/diarrhea


GENITOURINARY: denies dysuria


MUSCULOSKELETAL: LUE weakness and LLE weakness


NEUROLOGICAL: left sided paresis, facial droop


SKIN: no rash


PSYCHIATRIC: +schizophrenia


All other review of systems found to be negative.








PHYSICAL EXAMINATION:


VITAL SIGNS: Please see below.


GENERAL: Pleasant and cooperative. No acute distress. obese


HEENT: PERRL. Extraocular movements intact. Clear conjunctiva, left sided facial

droop


CARDIOVASCULAR: Regular rate and rhythm. No murmurs, rubs, or gallops


LUNGS: decreased breath sounds, no wheezes appreciated


ABDOMEN: Soft, nontender, nondistended. Positive bowel sounds. Normal active 

bowel sounds


NEUROLOGICAL: Alert and oriented times three. Cranial nerves II through XII 

grossly intact except left facial droop. Sensation grossly intact  to light 

touch all 4 extremities


equivocal babinksi bilat, no clonus


EXTREMITIES: 5\5 strength right upper extremities. flaccid LUE, 5\5 strength 

right lower extremity. 4/5 strength in left lower extremity. 





SKIN:keratotic, RLQ of her abdomen with ecchymosis, no induration, non-TTP








ASSESSMENT:54-year-old F with past medical history of obesity, HTN, who presents

status post stroke.





PLAN:


1. Rehab: PT- strengthen/stretch/maintain ROM bilat LE, improve gait and 

endurance, family training- trunk control improving 


OT-strengthen/stretch/maintain ROM bilat UE, improve trunk control and ADL 

management


SLP- significant dysphagia, c/u puree diet with upgraded to nectar thickened  

liquids


2. Neuro: recent CVA with new onset stroke in the bilateral basal ganglia and 

corona radiata- c/u ASA and Plavix, c/u statin, f/u with [facility] stroke [facility]


-proza  qhs for motor recovery


3. Cardio: pmh HTN, c/u lisinopril and HCTZ- medicine consulted to assist in 

management


4. Resp: heavy smoker, c/u Duonebs, GIULIANO c/u CPAP, monitor pulse-ox and for 

infection


-CXR 8/23/19, no infiltrate


5. Psych- pmh schizophrenia c/u Wellbutryn


6. DVT ppx: lovenox and TEDs, will order Dopplers today


7. GI ppx- protonix


8. : monitor PVRs, patient with increased urination, initial Ucx contaminated,

 s/p one time dose Fosfomycin 9/5/19, Detrol started, urgency symptoms 

improving, will still recheck UA


9. Pain: lidoderm patch to low back qHS, c/u standing tylenol, c/u [id number] 

am and 1pm and q12h prn 


10. Skin: abdominal ecchymosis likely due transfers, will monitor- Hgb stable


9. Dispo: 9/17/19 to home, progressing slowly towards goals


Allergies


Coded Allergies:  


     No Known Allergies (Verified , 8/26/04)





Vital Signs





Vital Signs








  Date Time  Temp Pulse Resp B/P (MAP) Pulse Ox O2 Delivery O2 Flow Rate FiO2


 


9/6/19 09:48   16     


 


9/6/19 05:56 97.9 69  145/80 (101) 96   











Laboratory Data


Labs 24H


Laboratory Tests 2


9/5/19 16:47: Bedside Glucose (Misc Panel) 150H


9/5/19 19:48: Bedside Glucose (Misc Panel) 230H


9/6/19 06:18: Bedside Glucose (Misc Panel) 148H


9/6/19 11:36: Bedside Glucose (Misc Panel) 229H





Current Medications


Current Medications





Current Medications








 Medications


  (Trade)  Dose


 Ordered  Sig/Rose


 Route


 PRN Reason  Start Time


 Stop Time Status Last Admin


Dose Admin


 


 Acetaminophen


  (Tylenol Tab)  650 mg  Q4HP  PRN


 PO


 fever/MILD PAIN (PS 1-4)  8/22/19 20:15


 8/29/19 11:26 DC 8/28/19 08:30





 


 Acetaminophen


  (Tylenol Tab)  1,000 mg  TID


 PO


   8/29/19 16:00


    9/6/19 09:17





 


 Albuterol/


 Ipratropium


  (Duoneb (Ipr


 0.5mg/Alb 2.5mg))  3 ml  RBID


 NEB


   8/23/19 08:00


    9/6/19 07:59





 


 Albuterol/


 Ipratropium


  (Duoneb (Ipr


 0.5mg/Alb 2.5mg))  3 ml  RQ8H  PRN


 NEB


 WHEEZING  8/22/19 20:15


     





 


 Aspirin


  (Aspirin


 Chewable)  81 mg  DAILY


 PO


   8/23/19 09:00


    9/6/19 09:16





 


 Atorvastatin


 Calcium


  (Lipitor)  80 mg  DAILY


 PO


   8/23/19 09:00


    9/6/19 09:17





 


 Bisacodyl


  (Dulcolax


 Suppository)  10 mg  DAILYPRN  PRN


 [place]


 CONSTIPATION  8/22/19 20:15


     





 


 Bupropion HCl


  (Wellbutrin Xl)  150 mg  QAM


 PO


   8/23/19 09:00


    9/6/19 09:17





 


 Clopidogrel


 Bisulfate


  (PLAVix)  75 mg  DAILY


 PO


   8/23/19 09:00


    9/6/19 09:17





 


 Dextrose


  (Dextrose 50%)  25 ml  ASDIRECTED  PRN


 IV


 SEE LABEL COMMENTS  8/22/19 20:15


     





 


 Docusate Sodium


  (Colace)  100 mg  BID


 PO


   8/22/19 21:00


    9/6/19 09:17





 


 Enoxaparin Sodium


  (Lovenox)  40 mg  DAILY


 SC


   8/23/19 09:00


    9/6/19 09:16





 


 Fluoxetine HCl


  (PROzac)  20 mg  QHS


 PO


   8/23/19 21:00


    9/5/19 20:40





 


 Fluticasone


 Propionate


  (Flonase 0.05%


 Nasal Spray)  1 spray  BID


 NARES


   8/22/19 21:00


    9/6/19 09:15





 


 Glucagon


  (Glucagon)  1 mg  ASDIRECTED  PRN


 SC


 SEE LABEL COMMENTS  8/22/19 20:15


     





 


 Glucose


  (Glucose)  16 GM  ASDIRECTED  PRN


 PO


 SEE LABEL COMMENTS  8/22/19 20:15


     





 


 Guaifenesin


  (Robitussin Tab)  400 mg  TID


 PO


   8/23/19 16:00


    9/6/19 09:18





 


 Home Med


  (Med Rec


 Complete!)    ASDIRECTED


 XX


   8/22/19 21:15


 8/22/19 21:15 DC  





 


 Hydrochlorothiazide


  (Hydrodiuril)  25 mg  DAILY


 PO


   8/23/19 09:00


    9/6/19 09:17





 


 Insulin Human


 Lispro


  (HumaLOG INSULIN)  SEE


 PROTOCOL


 TABLE  AC


 SC


   8/23/19 07:30


    9/6/19 09:16





 


 Insulin Human


 Lispro


  (HumaLOG INSULIN)  SEE


 PROTOCOL


 TABLE  QHS


 SC


   8/22/19 21:00


     





 


 Lactobacillus


 Acidophilus


  (Bacid)  1 ea  TID


 PO


   9/5/19 09:00


    9/6/19 09:17





 


 Latanoprost


  (Xalatan 0.005%


 Op Soln)  1 drop  QHS


 OU


   8/22/19 21:00


    9/5/19 20:42





 


 Lidocaine


  (Lidoderm Patch)  2 patch  QHS


 TD


   8/28/19 21:00


    9/5/19 20:38





 


 Lisinopril


  (Prinivil)  10 mg  QHS


 PO


   8/23/19 21:00


    9/5/19 20:39





 


 Magnesium


 Hydroxide


  (Milk Of


 Magnesia)  30 ml  DAILYPRN  PRN


 PO


 CONSTIPATION  8/22/19 20:15


     





 


 Menthol/Methyl


 Salicylate


  (Bengay Cream)  1 dose  TID


 TOP


   8/30/19 09:00


    9/6/19 09:15





 


 Non-Formulary


 Medication


  (** See Comment


 Field Below **)  REMOVE


 LIDODERM


 PATCH  DAILY@0900


 XX


   8/29/19 09:00


    9/6/19 08:38





 


 Nystatin


  (Mycostatin


 Powder, Nystop)    BID


 TOP


   8/25/19 21:00


    9/6/19 09:16





 


 Oxycodone HCl


  (Roxicodone,


 Oxyir)  5 mg  BID@1000,1300


 PO


   9/4/19 10:00


    9/6/19 09:18





 


 Oxycodone HCl


  (Roxicodone,


 Oxyir)  5 mg  Q4HP  PRN


 PO


 PAIN  8/30/19 15:00


    9/5/19 17:00





 


 Pantoprazole


 Sodium


  (Protonix)  40 mg  DAILY


 PO


   8/23/19 09:00


    9/6/19 09:17





 


 Senna


  (Senokot)  1 tab  QHS


 PO


   8/22/19 21:00


    9/5/19 20:39





 


 Tolterodine


 Tartrate


  (Detrol La)  2 mg  BID


 PO


   9/6/19 09:00


    9/6/19 09:18





 


 Tolterodine


 Tartrate


  (Detrol La)  2 mg  DAILY@1500,2300


 PO


   9/5/19 15:00


 9/5/19 23:01 DC 9/5/19 22:31




















JASWANT ALFARO MD          Sep 6, 2019 12:16

## 2019-09-07 VITALS — DIASTOLIC BLOOD PRESSURE: 56 MMHG | SYSTOLIC BLOOD PRESSURE: 122 MMHG

## 2019-09-07 VITALS — SYSTOLIC BLOOD PRESSURE: 140 MMHG | DIASTOLIC BLOOD PRESSURE: 73 MMHG

## 2019-09-07 VITALS — DIASTOLIC BLOOD PRESSURE: 70 MMHG | SYSTOLIC BLOOD PRESSURE: 129 MMHG

## 2019-09-07 LAB
BUN SERPL-MCNC: 17 MG/DL (ref 7–18)
CALCIUM SERPL-MCNC: 9.8 MG/DL (ref 8.5–10.1)
CHLORIDE SERPL-SCNC: 99 MEQ/L (ref 98–107)
CO2 SERPL-SCNC: 28 MEQ/L (ref 21–32)
CREAT SERPL-MCNC: 0.72 MG/DL (ref 0.55–1.3)
GFR SERPL CREATININE-BSD FRML MDRD: > 60 ML/MIN/{1.73_M2} (ref 51–?)
GLUCOSE SERPL-MCNC: 161 MG/DL (ref 70–100)
HCT VFR BLD AUTO: 43.5 % (ref 36–47)
HGB BLD-MCNC: 14.9 G/DL (ref 12–15.5)
MCH RBC QN AUTO: 29.3 PG (ref 27–33)
MCHC RBC AUTO-ENTMCNC: 34.3 G/DL (ref 32–36.5)
MCV RBC AUTO: 85.6 FL (ref 80–96)
PLATELET # BLD AUTO: 311 10^3/UL (ref 150–450)
POTASSIUM SERPL-SCNC: 4.9 MEQ/L (ref 3.5–5.1)
RBC # BLD AUTO: 5.08 10^6/UL (ref 4–5.4)
SODIUM SERPL-SCNC: 134 MEQ/L (ref 136–145)
WBC # BLD AUTO: 10.6 10^3/UL (ref 4–10)

## 2019-09-07 RX ADMIN — DOCUSATE SODIUM SCH MG: 100 CAPSULE, LIQUID FILLED ORAL at 20:00

## 2019-09-07 RX ADMIN — SENNOSIDES SCH TAB: 8.6 TABLET, FILM COATED ORAL at 20:00

## 2019-09-07 RX ADMIN — PROBIOTIC PRODUCT - TAB SCH EA: TAB at 15:18

## 2019-09-07 RX ADMIN — CIPROFLOXACIN HYDROCHLORIDE SCH MG: 500 TABLET, FILM COATED ORAL at 12:29

## 2019-09-07 RX ADMIN — INSULIN LISPRO SCH UNITS: 100 INJECTION, SOLUTION INTRAVENOUS; SUBCUTANEOUS at 21:00

## 2019-09-07 RX ADMIN — LATANOPROST SCH DROP: 50 SOLUTION OPHTHALMIC at 20:01

## 2019-09-07 RX ADMIN — ATORVASTATIN CALCIUM SCH MG: 20 TABLET, FILM COATED ORAL at 08:12

## 2019-09-07 RX ADMIN — CLOPIDOGREL BISULFATE SCH MG: 75 TABLET ORAL at 08:13

## 2019-09-07 RX ADMIN — FLUTICASONE PROPIONATE SCH SPRAY: 50 SPRAY, METERED NASAL at 20:02

## 2019-09-07 RX ADMIN — MENTHOL, METHYL SALICYLATE SCH DOSE: 10; 15 CREAM TOPICAL at 08:14

## 2019-09-07 RX ADMIN — Medication SCH: at 08:14

## 2019-09-07 RX ADMIN — NYSTATIN SCH DOSE: 100000 POWDER TOPICAL at 20:01

## 2019-09-07 RX ADMIN — ENOXAPARIN SODIUM SCH MG: 40 INJECTION SUBCUTANEOUS at 08:14

## 2019-09-07 RX ADMIN — TOLTERODINE SCH MG: 2 CAPSULE, EXTENDED RELEASE ORAL at 20:00

## 2019-09-07 RX ADMIN — IPRATROPIUM BROMIDE AND ALBUTEROL SULFATE SCH ML: .5; 3 SOLUTION RESPIRATORY (INHALATION) at 07:20

## 2019-09-07 RX ADMIN — MENTHOL, METHYL SALICYLATE SCH DOSE: 10; 15 CREAM TOPICAL at 20:02

## 2019-09-07 RX ADMIN — ACETAMINOPHEN SCH MG: 500 TABLET ORAL at 15:18

## 2019-09-07 RX ADMIN — ASPIRIN 81 MG CHEWABLE TABLET SCH MG: 81 TABLET CHEWABLE at 08:12

## 2019-09-07 RX ADMIN — IPRATROPIUM BROMIDE AND ALBUTEROL SULFATE SCH ML: .5; 3 SOLUTION RESPIRATORY (INHALATION) at 18:14

## 2019-09-07 RX ADMIN — BUPROPION HYDROCHLORIDE SCH MG: 150 TABLET, FILM COATED, EXTENDED RELEASE ORAL at 08:13

## 2019-09-07 RX ADMIN — LISINOPRIL SCH MG: 10 TABLET ORAL at 20:01

## 2019-09-07 RX ADMIN — DOCUSATE SODIUM SCH MG: 100 CAPSULE, LIQUID FILLED ORAL at 08:15

## 2019-09-07 RX ADMIN — LIDOCAINE SCH PATCH: 50 PATCH CUTANEOUS at 20:02

## 2019-09-07 RX ADMIN — PROBIOTIC PRODUCT - TAB SCH EA: TAB at 08:13

## 2019-09-07 RX ADMIN — FLUTICASONE PROPIONATE SCH SPRAY: 50 SPRAY, METERED NASAL at 08:14

## 2019-09-07 RX ADMIN — PANTOPRAZOLE SODIUM SCH MG: 40 TABLET, DELAYED RELEASE ORAL at 08:13

## 2019-09-07 RX ADMIN — PROBIOTIC PRODUCT - TAB SCH EA: TAB at 20:01

## 2019-09-07 RX ADMIN — ACETAMINOPHEN SCH MG: 500 TABLET ORAL at 20:00

## 2019-09-07 RX ADMIN — INSULIN LISPRO SCH UNITS: 100 INJECTION, SOLUTION INTRAVENOUS; SUBCUTANEOUS at 08:12

## 2019-09-07 RX ADMIN — MENTHOL, METHYL SALICYLATE SCH DOSE: 10; 15 CREAM TOPICAL at 15:19

## 2019-09-07 RX ADMIN — TOLTERODINE SCH MG: 2 CAPSULE, EXTENDED RELEASE ORAL at 08:13

## 2019-09-07 RX ADMIN — INSULIN LISPRO SCH UNITS: 100 INJECTION, SOLUTION INTRAVENOUS; SUBCUTANEOUS at 17:17

## 2019-09-07 RX ADMIN — ACETAMINOPHEN SCH MG: 500 TABLET ORAL at 08:13

## 2019-09-07 RX ADMIN — NYSTATIN SCH DOSE: 100000 POWDER TOPICAL at 08:14

## 2019-09-07 RX ADMIN — INSULIN LISPRO SCH UNITS: 100 INJECTION, SOLUTION INTRAVENOUS; SUBCUTANEOUS at 12:30

## 2019-09-07 RX ADMIN — CIPROFLOXACIN HYDROCHLORIDE SCH MG: 500 TABLET, FILM COATED ORAL at 17:16

## 2019-09-07 NOTE — IPNPDOC
Date Seen


The patient was seen on 9/7/19.





Progress Note


Subjective:


Patient reports feeling well, she does complain of urgency and frequency with 

urination but denies any burning back pain or suprapubic pain. She denies any 

chest pain, shortness of breath or palpitations. She does report some chronic 

aching of her back which she has had for years





Objective:


Vitals (See below)


General: Sitting in a chair no acute distress, comfortable, AAOx3


HEENT: NC, AT


CVS: RRR


Lungs: Fair air entry b/l, auscultation is free of rhonchi, rales or wheezing


Abdomen: Soft, no distention, no tenderness, obese


Extremities: Lower extremities do not appear to reveal any edema, - Calf 

tenderness


Neuro: Upper and lower extremities on the left side with persistent weakness, 

cranial nerves appear grossly intact


Psychological: Appropriate





Labs: Drawn today 





microbiology: See below





Assessment and plan:


s/p Acute CVA, bilateral basal ganglia


- c/w ASA, Plavix and Atorvastatin


- Physical therapy at the direction of ARU





Dysphagia / Slurred speech - 2/2 above


- c/w level 2 diet, nectar-thick liquids





HTN


- Blood pressure controlled


- c/w HCTZ and Lisinopril





DM2


- c/w ISS


 


GIULIANO on CPAP


- c/w CPAP





Mood disorder


- c/w Fluoxetine and Bupropion 





Urinary tract infection


-Patient has symptoms UA is abnormal for the second time, I will begin her on 

ciprofloxacin by mouth and follow-up her cultures previously she is growing 

pansensitive Escherichia coli in the past





DVT prophylaxis


- c/w Lovenox





VS, I&O, 24H, Fishbone


Vital Signs/I&O





Vital Signs








  Date Time  Temp Pulse Resp B/P (MAP) Pulse Ox O2 Delivery O2 Flow Rate FiO2


 


9/7/19 10:44   16     


 


9/7/19 04:00 98.2 81  140/73 (95) 93   














I&O- Last 24 Hours up to 6 AM 


 


 9/7/19





 06:00


 


Intake Total 2275 ml


 


Balance 2275 ml











Laboratory Data


24H LABS


Laboratory Tests 2


9/6/19 11:36: Bedside Glucose (Misc Panel) 229H


9/6/19 15:20: 


Urine Color YELLOW, Urine Appearance TURBIDH, Urine pH 6.0, Urine Specific 

Gravity 1.016, Urine Protein 1+H, Urine Glucose (UA) NEGATIVE, Urine Ketones 

NEGATIVE, Urine Blood 1+H, Urine Nitrite NEGATIVE, Urine Bilirubin NEGATIVE, 

Urine Urobilinogen 0.2, Urine Leukocyte Esterase 3+H, Urine WBC (Auto) TNTCH, 

Urine RBC (Auto) 14H, Urine Hyaline Casts (Auto) 0, Urine Bacteria (Auto) 

NEGATIVE, Urine Squamous Epithelial Cells 4, Urine Sperm (Auto) 


9/6/19 16:24: Bedside Glucose (Misc Panel) 126H


9/6/19 20:20: Bedside Glucose (Misc Panel) 158H


9/7/19 07:00: Bedside Glucose (Misc Panel) 189H


Microbiology





Microbiology


9/6/19 Urine Culture, Received


         Pending











SHIRA ROMERO MD               Sep 7, 2019 10:57

## 2019-09-08 VITALS — DIASTOLIC BLOOD PRESSURE: 66 MMHG | SYSTOLIC BLOOD PRESSURE: 130 MMHG

## 2019-09-08 VITALS — DIASTOLIC BLOOD PRESSURE: 70 MMHG | SYSTOLIC BLOOD PRESSURE: 120 MMHG

## 2019-09-08 VITALS — SYSTOLIC BLOOD PRESSURE: 116 MMHG | DIASTOLIC BLOOD PRESSURE: 69 MMHG

## 2019-09-08 RX ADMIN — Medication SCH: at 08:32

## 2019-09-08 RX ADMIN — SENNOSIDES SCH TAB: 8.6 TABLET, FILM COATED ORAL at 21:14

## 2019-09-08 RX ADMIN — ENOXAPARIN SODIUM SCH MG: 40 INJECTION SUBCUTANEOUS at 08:31

## 2019-09-08 RX ADMIN — IPRATROPIUM BROMIDE AND ALBUTEROL SULFATE SCH ML: .5; 3 SOLUTION RESPIRATORY (INHALATION) at 19:59

## 2019-09-08 RX ADMIN — MENTHOL, METHYL SALICYLATE SCH DOSE: 10; 15 CREAM TOPICAL at 17:02

## 2019-09-08 RX ADMIN — DOCUSATE SODIUM SCH MG: 100 CAPSULE, LIQUID FILLED ORAL at 08:30

## 2019-09-08 RX ADMIN — INSULIN LISPRO SCH UNITS: 100 INJECTION, SOLUTION INTRAVENOUS; SUBCUTANEOUS at 08:30

## 2019-09-08 RX ADMIN — PROBIOTIC PRODUCT - TAB SCH EA: TAB at 21:14

## 2019-09-08 RX ADMIN — ACETAMINOPHEN SCH MG: 500 TABLET ORAL at 08:31

## 2019-09-08 RX ADMIN — INSULIN LISPRO SCH UNITS: 100 INJECTION, SOLUTION INTRAVENOUS; SUBCUTANEOUS at 21:00

## 2019-09-08 RX ADMIN — INSULIN LISPRO SCH UNITS: 100 INJECTION, SOLUTION INTRAVENOUS; SUBCUTANEOUS at 12:05

## 2019-09-08 RX ADMIN — ACETAMINOPHEN SCH MG: 500 TABLET ORAL at 21:15

## 2019-09-08 RX ADMIN — NYSTATIN SCH DOSE: 100000 POWDER TOPICAL at 08:32

## 2019-09-08 RX ADMIN — BUPROPION HYDROCHLORIDE SCH MG: 150 TABLET, FILM COATED, EXTENDED RELEASE ORAL at 08:30

## 2019-09-08 RX ADMIN — PROBIOTIC PRODUCT - TAB SCH EA: TAB at 17:02

## 2019-09-08 RX ADMIN — INSULIN LISPRO SCH UNITS: 100 INJECTION, SOLUTION INTRAVENOUS; SUBCUTANEOUS at 17:01

## 2019-09-08 RX ADMIN — DOCUSATE SODIUM SCH MG: 100 CAPSULE, LIQUID FILLED ORAL at 21:14

## 2019-09-08 RX ADMIN — LISINOPRIL SCH MG: 10 TABLET ORAL at 21:14

## 2019-09-08 RX ADMIN — MENTHOL, METHYL SALICYLATE SCH DOSE: 10; 15 CREAM TOPICAL at 21:00

## 2019-09-08 RX ADMIN — CIPROFLOXACIN HYDROCHLORIDE SCH MG: 500 TABLET, FILM COATED ORAL at 17:02

## 2019-09-08 RX ADMIN — FLUTICASONE PROPIONATE SCH SPRAY: 50 SPRAY, METERED NASAL at 21:14

## 2019-09-08 RX ADMIN — NYSTATIN SCH DOSE: 100000 POWDER TOPICAL at 21:15

## 2019-09-08 RX ADMIN — IPRATROPIUM BROMIDE AND ALBUTEROL SULFATE SCH ML: .5; 3 SOLUTION RESPIRATORY (INHALATION) at 07:23

## 2019-09-08 RX ADMIN — CIPROFLOXACIN HYDROCHLORIDE SCH MG: 500 TABLET, FILM COATED ORAL at 05:38

## 2019-09-08 RX ADMIN — ATORVASTATIN CALCIUM SCH MG: 20 TABLET, FILM COATED ORAL at 08:30

## 2019-09-08 RX ADMIN — TOLTERODINE SCH MG: 2 CAPSULE, EXTENDED RELEASE ORAL at 21:14

## 2019-09-08 RX ADMIN — LATANOPROST SCH DROP: 50 SOLUTION OPHTHALMIC at 21:14

## 2019-09-08 RX ADMIN — PANTOPRAZOLE SODIUM SCH MG: 40 TABLET, DELAYED RELEASE ORAL at 08:30

## 2019-09-08 RX ADMIN — ACETAMINOPHEN SCH MG: 500 TABLET ORAL at 17:02

## 2019-09-08 RX ADMIN — CLOPIDOGREL BISULFATE SCH MG: 75 TABLET ORAL at 08:30

## 2019-09-08 RX ADMIN — FLUTICASONE PROPIONATE SCH SPRAY: 50 SPRAY, METERED NASAL at 08:31

## 2019-09-08 RX ADMIN — MENTHOL, METHYL SALICYLATE SCH DOSE: 10; 15 CREAM TOPICAL at 08:32

## 2019-09-08 RX ADMIN — LIDOCAINE SCH PATCH: 50 PATCH CUTANEOUS at 21:14

## 2019-09-08 RX ADMIN — ASPIRIN 81 MG CHEWABLE TABLET SCH MG: 81 TABLET CHEWABLE at 08:30

## 2019-09-08 RX ADMIN — TOLTERODINE SCH MG: 2 CAPSULE, EXTENDED RELEASE ORAL at 08:31

## 2019-09-08 RX ADMIN — PROBIOTIC PRODUCT - TAB SCH EA: TAB at 08:30

## 2019-09-09 VITALS — DIASTOLIC BLOOD PRESSURE: 75 MMHG | SYSTOLIC BLOOD PRESSURE: 141 MMHG

## 2019-09-09 VITALS — DIASTOLIC BLOOD PRESSURE: 80 MMHG | SYSTOLIC BLOOD PRESSURE: 130 MMHG

## 2019-09-09 VITALS — SYSTOLIC BLOOD PRESSURE: 114 MMHG | DIASTOLIC BLOOD PRESSURE: 75 MMHG

## 2019-09-09 RX ADMIN — IPRATROPIUM BROMIDE AND ALBUTEROL SULFATE SCH ML: .5; 3 SOLUTION RESPIRATORY (INHALATION) at 07:31

## 2019-09-09 RX ADMIN — NYSTATIN SCH DOSE: 100000 POWDER TOPICAL at 20:43

## 2019-09-09 RX ADMIN — TOLTERODINE SCH MG: 2 CAPSULE, EXTENDED RELEASE ORAL at 08:57

## 2019-09-09 RX ADMIN — DOCUSATE SODIUM SCH MG: 100 CAPSULE, LIQUID FILLED ORAL at 20:41

## 2019-09-09 RX ADMIN — MENTHOL, METHYL SALICYLATE SCH DOSE: 10; 15 CREAM TOPICAL at 20:42

## 2019-09-09 RX ADMIN — MENTHOL, METHYL SALICYLATE SCH DOSE: 10; 15 CREAM TOPICAL at 08:59

## 2019-09-09 RX ADMIN — MENTHOL, METHYL SALICYLATE SCH DOSE: 10; 15 CREAM TOPICAL at 16:00

## 2019-09-09 RX ADMIN — PROBIOTIC PRODUCT - TAB SCH EA: TAB at 16:19

## 2019-09-09 RX ADMIN — LIDOCAINE SCH PATCH: 50 PATCH CUTANEOUS at 20:41

## 2019-09-09 RX ADMIN — INSULIN LISPRO SCH UNITS: 100 INJECTION, SOLUTION INTRAVENOUS; SUBCUTANEOUS at 17:41

## 2019-09-09 RX ADMIN — NYSTATIN SCH DOSE: 100000 POWDER TOPICAL at 08:59

## 2019-09-09 RX ADMIN — INSULIN LISPRO SCH UNITS: 100 INJECTION, SOLUTION INTRAVENOUS; SUBCUTANEOUS at 12:25

## 2019-09-09 RX ADMIN — ATORVASTATIN CALCIUM SCH MG: 20 TABLET, FILM COATED ORAL at 08:56

## 2019-09-09 RX ADMIN — ASPIRIN 81 MG CHEWABLE TABLET SCH MG: 81 TABLET CHEWABLE at 08:57

## 2019-09-09 RX ADMIN — INSULIN LISPRO SCH UNITS: 100 INJECTION, SOLUTION INTRAVENOUS; SUBCUTANEOUS at 21:00

## 2019-09-09 RX ADMIN — BUPROPION HYDROCHLORIDE SCH MG: 150 TABLET, FILM COATED, EXTENDED RELEASE ORAL at 08:58

## 2019-09-09 RX ADMIN — FLUTICASONE PROPIONATE SCH SPRAY: 50 SPRAY, METERED NASAL at 08:58

## 2019-09-09 RX ADMIN — LATANOPROST SCH DROP: 50 SOLUTION OPHTHALMIC at 20:42

## 2019-09-09 RX ADMIN — PANTOPRAZOLE SODIUM SCH MG: 40 TABLET, DELAYED RELEASE ORAL at 08:56

## 2019-09-09 RX ADMIN — ACETAMINOPHEN SCH MG: 500 TABLET ORAL at 20:41

## 2019-09-09 RX ADMIN — CIPROFLOXACIN HYDROCHLORIDE SCH MG: 500 TABLET, FILM COATED ORAL at 17:41

## 2019-09-09 RX ADMIN — LISINOPRIL SCH MG: 10 TABLET ORAL at 20:42

## 2019-09-09 RX ADMIN — ACETAMINOPHEN SCH MG: 500 TABLET ORAL at 16:19

## 2019-09-09 RX ADMIN — PROBIOTIC PRODUCT - TAB SCH EA: TAB at 08:56

## 2019-09-09 RX ADMIN — DOCUSATE SODIUM SCH MG: 100 CAPSULE, LIQUID FILLED ORAL at 08:57

## 2019-09-09 RX ADMIN — ENOXAPARIN SODIUM SCH MG: 40 INJECTION SUBCUTANEOUS at 08:58

## 2019-09-09 RX ADMIN — Medication SCH: at 08:59

## 2019-09-09 RX ADMIN — IPRATROPIUM BROMIDE AND ALBUTEROL SULFATE SCH ML: .5; 3 SOLUTION RESPIRATORY (INHALATION) at 20:00

## 2019-09-09 RX ADMIN — SENNOSIDES SCH TAB: 8.6 TABLET, FILM COATED ORAL at 20:41

## 2019-09-09 RX ADMIN — FLUTICASONE PROPIONATE SCH SPRAY: 50 SPRAY, METERED NASAL at 20:42

## 2019-09-09 RX ADMIN — CIPROFLOXACIN HYDROCHLORIDE SCH MG: 500 TABLET, FILM COATED ORAL at 06:51

## 2019-09-09 RX ADMIN — CLOPIDOGREL BISULFATE SCH MG: 75 TABLET ORAL at 08:56

## 2019-09-09 RX ADMIN — TOLTERODINE SCH MG: 2 CAPSULE, EXTENDED RELEASE ORAL at 20:41

## 2019-09-09 RX ADMIN — PROBIOTIC PRODUCT - TAB SCH EA: TAB at 20:41

## 2019-09-09 RX ADMIN — ACETAMINOPHEN SCH MG: 500 TABLET ORAL at 08:58

## 2019-09-09 RX ADMIN — INSULIN LISPRO SCH UNITS: 100 INJECTION, SOLUTION INTRAVENOUS; SUBCUTANEOUS at 08:56

## 2019-09-09 NOTE — IPNPDOC
Date Seen


The patient was seen on 9/9/19.





Progress Note


Subjective:


Patient reports feeling well, she reports good improvement in her urgency and 

frequency with urination. She denies any chest pain, shortness of breath or 

palpitations. She does report some chronic aching of her back which she has had 

for years





Objective:


Vitals (See below)


General: Sitting in a chair no acute distress, comfortable, AAOx3


HEENT: NC, AT, unchanged asymmetry


CVS: RRR, no additional heart sounds appreciated


Lungs: Fair air entry b/l, auscultation is free of rhonchi, rales or wheezing


Abdomen: Soft, no distention, no tenderness, obese


Extremities: Lower extremities do not appear to reveal any edema, no Calf 

tenderness


Neuro: Upper and lower extremities on the left side with persistent weakness, 

cranial nerves appear grossly intact


Psychological: Appropriate





microbiology: See below





Assessment and plan:


s/p Acute CVA, bilateral basal ganglia, stable


- c/w ASA, Plavix and Atorvastatin


- Physical therapy at the direction of ARU





Dysphagia / Slurred speech - 2/2 above


- c/w level 2 diet, nectar-thick liquids, stable





HTN


- Blood pressure controlled


- c/w HCTZ and Lisinopril





DM2


- c/w ISS


 


GIULIANO on CPAP


- c/w CPAP





Mood disorder


- c/w Fluoxetine and Bupropion 





Urinary tract infection


-Patient previously was having symptoms urine culture is positive. She was 

started on ciprofloxacin and also sensitivity of the staff to quinolone has not 

been demonstrated her symptoms have been improving would simply monitor on this 

for now should she worsen or fail to have continued improvement could consider 

switching to Bactrim. Doing well and resolving at this time





DVT prophylaxis


- c/w Lovenox





VS, I&O, 24H, Fishbone


Vital Signs/I&O





Vital Signs








  Date Time  Temp Pulse Resp B/P (MAP) Pulse Ox O2 Delivery O2 Flow Rate FiO2


 


9/9/19 09:00   18     


 


9/9/19 06:00 97.9 69  130/80 (97) 95   














I&O- Last 24 Hours up to 6 AM 


 


 9/9/19





 05:59


 


Intake Total 900 ml


 


Balance 900 ml











Laboratory Data


24H LABS


Laboratory Tests 2


9/8/19 11:47: Bedside Glucose (Misc Panel) 185H


9/8/19 16:42: Bedside Glucose (Misc Panel) 146H


9/8/19 19:46: Bedside Glucose (Misc Panel) 175H


9/9/19 06:25: Bedside Glucose (Misc Panel) 177H


Microbiology





Microbiology


9/6/19 Urine Culture - Final, Complete


         Escherichia Coli


         Staphylococcus Silvanoneri











SHIRA ROMERO MD               Sep 9, 2019 10:05

## 2019-09-09 NOTE — IPNPDOC
PM&R Progress Note


DATE OF SERVICE:  Sep 9, 2019


Physiatrist Progress Note


Subjective:


Patient reporting she does not have low back pain right now and that the 

oxycodone seems to help.





REVIEW OF SYSTEMS: The following is a completed review of systems and has been 

reviewed. Review of systems otherwise unremarkable.


PAIN: Patient self reports no pain


EYES: No recent vision changes


EARS, NOSE, & THROAT: +dysphagia


CARDIOVASCULAR: Denies chest pain or palpitations


PULMONARY: Denies shortness of breath, except with exertion


GASTROINTESTINAL: Denies constipation/diarrhea


GENITOURINARY: denies dysuria


MUSCULOSKELETAL: LUE weakness and LLE weakness


NEUROLOGICAL: left sided paresis, facial droop


SKIN: no rash


PSYCHIATRIC: +schizophrenia


All other review of systems found to be negative.








PHYSICAL EXAMINATION:


VITAL SIGNS: Please see below.


GENERAL: Pleasant and cooperative. No acute distress. obese


HEENT: PERRL. Extraocular movements intact. Clear conjunctiva, left sided facial

droop


CARDIOVASCULAR: Regular rate and rhythm. No murmurs, rubs, or gallops


LUNGS: decreased breath sounds, no wheezes appreciated


ABDOMEN: Soft, nontender, nondistended. Positive bowel sounds. Normal active 

bowel sounds


NEUROLOGICAL: Alert and oriented times three. Cranial nerves II through XII 

grossly intact except left facial droop. Sensation grossly intact  to light 

touch all 4 extremities


equivocal babinksi bilat, no clonus


EXTREMITIES: 5\5 strength right upper extremities. flaccid LUE, 5\5 strength 

right lower extremity. 4/5 strength in left lower extremity. 





SKIN:keratotic, RLQ of her abdomen with ecchymosis, no induration, non-TTP








ASSESSMENT:54-year-old F with past medical history of obesity, HTN, who presents

status post stroke.





PLAN:


1. Rehab: PT- strengthen/stretch/maintain ROM bilat LE, improve gait and 

endurance, family training- trunk control improving and able to take steps in 

parallel bars


OT-strengthen/stretch/maintain ROM bilat UE, improve trunk control and ADL 

management


SLP- significant dysphagia, c/u puree diet with upgraded to nectar thickened  

liquids


2. Neuro: recent CVA with new onset stroke in the bilateral basal ganglia and 

corona radiata- c/u ASA and Plavix, c/u statin, f/u with [facility]


3. Cardio: pmh HTN, c/u lisinopril and HCTZ- medicine consulted to assist in 

management


4. Resp: heavy smoker, c/u Duonebs, GIULIANO c/u CPAP, monitor pulse-ox and for 

infection


-CXR 8/23/19, no infiltrate


5. Psych- pmh schizophrenia c/u Wellbutryn


6. DVT ppx: lovenox and TEDs, will order Dopplers today


7. GI ppx- protonix


8. : monitor PVRs, patient with increased urination, initial Ucx contaminated,

 s/p one time dose Fosfomycin 9/5/19, Detrol started, repeat UA + E. coli and 

Staph Warneri, started on Ciprofloxacin day 3/7


9. Pain: chronic right sided low back pain, c/u lidoderm patch to low back qHS, 

c/u standing tylenol, c/u [id number] am and 1pm and q12h prn 


10. Skin: abdominal ecchymosis likely due transfers, will monitor- Hgb stable


9. Dispo: 9/17/19 to home, progressing slowly towards goals


Allergies


Coded Allergies:  


     No Known Allergies (Verified , 8/26/04)





Vital Signs





Vital Signs








  Date Time  Temp Pulse Resp B/P (MAP) Pulse Ox O2 Delivery O2 Flow Rate FiO2


 


9/9/19 09:00   18     


 


9/9/19 06:00 97.9 69  130/80 (97) 95   











Laboratory Data


Labs 24H


Laboratory Tests 2


9/8/19 11:47: Bedside Glucose (Misc Panel) 185H


9/8/19 16:42: Bedside Glucose (Misc Panel) 146H


9/8/19 19:46: Bedside Glucose (Misc Panel) 175H


9/9/19 06:25: Bedside Glucose (Misc Panel) 177H





Microbiology





Microbiology


9/6/19 Urine Culture - Final, Complete


         Escherichia Coli


         Staphylococcus Warneri





Current Medications


Current Medications





Current Medications








 Medications


  (Trade)  Dose


 Ordered  Sig/Rose


 Route


 PRN Reason  Start Time


 Stop Time Status Last Admin


Dose Admin


 


 Acetaminophen


  (Tylenol Tab)  650 mg  Q4HP  PRN


 PO


 fever/MILD PAIN (PS 1-4)  8/22/19 20:15


 8/29/19 11:26 DC 8/28/19 08:30





 


 Acetaminophen


  (Tylenol Tab)  1,000 mg  TID


 PO


   8/29/19 16:00


    9/9/19 08:58





 


 Albuterol/


 Ipratropium


  (Duoneb (Ipr


 0.5mg/Alb 2.5mg))  3 ml  RBID


 NEB


   8/23/19 08:00


    9/9/19 07:31





 


 Albuterol/


 Ipratropium


  (Duoneb (Ipr


 0.5mg/Alb 2.5mg))  3 ml  RQ8H  PRN


 NEB


 WHEEZING  8/22/19 20:15


     





 


 Aspirin


  (Aspirin


 Chewable)  81 mg  DAILY


 PO


   8/23/19 09:00


    9/9/19 08:57





 


 Atorvastatin


 Calcium


  (Lipitor)  80 mg  DAILY


 PO


   8/23/19 09:00


    9/9/19 08:56





 


 Bisacodyl


  (Dulcolax


 Suppository)  10 mg  DAILYPRN  PRN


 LA


 CONSTIPATION  8/22/19 20:15


     





 


 Bupropion HCl


  (Wellbutrin Xl)  150 mg  QAM


 PO


   8/23/19 09:00


    9/9/19 08:58





 


 Ciprofloxacin


  (Cipro)  500 mg  BID@06,18


 PO


   9/7/19 11:00


    9/9/19 06:51





 


 Clopidogrel


 Bisulfate


  (PLAVix)  75 mg  DAILY


 PO


   8/23/19 09:00


    9/9/19 08:56





 


 Dextrose


  (Dextrose 50%)  25 ml  ASDIRECTED  PRN


 IV


 SEE LABEL COMMENTS  8/22/19 20:15


     





 


 Docusate Sodium


  (Colace)  100 mg  BID


 PO


   8/22/19 21:00


    9/9/19 08:57





 


 Enoxaparin Sodium


  (Lovenox)  40 mg  DAILY


 SC


   8/23/19 09:00


    9/9/19 08:58





 


 Fluoxetine HCl


  (PROzac)  20 mg  QHS


 PO


   8/23/19 21:00


    9/8/19 21:14





 


 Fluticasone


 Propionate


  (Flonase 0.05%


 Nasal Spray)  1 spray  BID


 NARES


   8/22/19 21:00


    9/9/19 08:58





 


 Glucagon


  (Glucagon)  1 mg  ASDIRECTED  PRN


 SC


 SEE LABEL COMMENTS  8/22/19 20:15


     





 


 Glucose


  (Glucose)  16 GM  ASDIRECTED  PRN


 PO


 SEE LABEL COMMENTS  8/22/19 20:15


     





 


 Guaifenesin


  (Robitussin Tab)  400 mg  TID


 PO


   8/23/19 16:00


    9/9/19 08:57





 


 Home Med


  (Med Rec


 Complete!)    ASDIRECTED


 XX


   8/22/19 21:15


 8/22/19 21:15 DC  





 


 Hydrochlorothiazide


  (Hydrodiuril)  25 mg  DAILY


 PO


   8/23/19 09:00


    9/9/19 08:57





 


 Insulin Human


 Lispro


  (HumaLOG INSULIN)  SEE


 PROTOCOL


 TABLE  AC


 SC


   8/23/19 07:30


    9/9/19 08:56





 


 Insulin Human


 Lispro


  (HumaLOG INSULIN)  SEE


 PROTOCOL


 TABLE  QHS


 SC


   8/22/19 21:00


     





 


 Lactobacillus


 Acidophilus


  (Bacid)  1 ea  TID


 PO


   9/5/19 09:00


    9/9/19 08:56





 


 Latanoprost


  (Xalatan 0.005%


 Op Soln)  1 drop  QHS


 OU


   8/22/19 21:00


    9/8/19 21:14





 


 Lidocaine


  (Lidoderm Patch)  2 patch  QHS


 TD


   8/28/19 21:00


    9/8/19 21:14





 


 Lisinopril


  (Prinivil)  10 mg  QHS


 PO


   8/23/19 21:00


    9/8/19 21:14





 


 Magnesium


 Hydroxide


  (Milk Of


 Magnesia)  30 ml  DAILYPRN  PRN


 PO


 CONSTIPATION  8/22/19 20:15


     





 


 Menthol/Methyl


 Salicylate


  (Bengay Cream)  1 dose  TID


 TOP


   8/30/19 09:00


    9/8/19 17:02





 


 Non-Formulary


 Medication


  (** See Comment


 Field Below **)  REMOVE


 LIDODERM


 PATCH  DAILY@0900


 XX


   8/29/19 09:00


    9/9/19 08:59





 


 Nystatin


  (Mycostatin


 Powder, Nystop)    BID


 TOP


   8/25/19 21:00


    9/9/19 08:59





 


 Oxycodone HCl


  (Roxicodone,


 Oxyir)  5 mg  BID@1000,1300


 PO


   9/4/19 10:00


    9/9/19 09:00





 


 Oxycodone HCl


  (Roxicodone,


 Oxyir)  5 mg  Q4HP  PRN


 PO


 PAIN  8/30/19 15:00


    9/5/19 17:00





 


 Pantoprazole


 Sodium


  (Protonix)  40 mg  DAILY


 PO


   8/23/19 09:00


    9/9/19 08:56





 


 Senna


  (Senokot)  1 tab  QHS


 PO


   8/22/19 21:00


    9/8/19 21:14





 


 Tolterodine


 Tartrate


  (Detrol La)  2 mg  BID


 PO


   9/6/19 09:00


    9/9/19 08:57





 


 Tolterodine


 Tartrate


  (Detrol La)  2 mg  DAILY@1500,2300


 PO


   9/5/19 15:00


 9/5/19 23:01 DC 9/5/19 22:31




















JASWANT ALFARO MD          Sep 9, 2019 11:49

## 2019-09-10 VITALS — DIASTOLIC BLOOD PRESSURE: 73 MMHG | SYSTOLIC BLOOD PRESSURE: 131 MMHG

## 2019-09-10 VITALS — SYSTOLIC BLOOD PRESSURE: 128 MMHG | DIASTOLIC BLOOD PRESSURE: 72 MMHG

## 2019-09-10 VITALS — DIASTOLIC BLOOD PRESSURE: 54 MMHG | SYSTOLIC BLOOD PRESSURE: 117 MMHG

## 2019-09-10 LAB
BASOPHILS # BLD AUTO: 0.1 10^3/UL (ref 0–0.2)
BASOPHILS NFR BLD AUTO: 0.9 % (ref 0–1)
EOSINOPHIL # BLD AUTO: 0.2 10^3/UL (ref 0–0.5)
EOSINOPHIL NFR BLD AUTO: 1.7 % (ref 0–3)
HCT VFR BLD AUTO: 46.1 % (ref 36–47)
HGB BLD-MCNC: 15.5 G/DL (ref 12–15.5)
LYMPHOCYTES # BLD AUTO: 1.8 10^3/UL (ref 1.5–5)
LYMPHOCYTES NFR BLD AUTO: 18.1 % (ref 24–44)
MCH RBC QN AUTO: 29.7 PG (ref 27–33)
MCHC RBC AUTO-ENTMCNC: 33.6 G/DL (ref 32–36.5)
MCV RBC AUTO: 88.3 FL (ref 80–96)
MONOCYTES # BLD AUTO: 0.6 10^3/UL (ref 0–0.8)
MONOCYTES NFR BLD AUTO: 5.9 % (ref 0–5)
NEUTROPHILS # BLD AUTO: 7.1 10^3/UL (ref 1.5–8.5)
NEUTROPHILS NFR BLD AUTO: 73 % (ref 36–66)
PLATELET # BLD AUTO: 300 10^3/UL (ref 150–450)
RBC # BLD AUTO: 5.22 10^6/UL (ref 4–5.4)
WBC # BLD AUTO: 9.7 10^3/UL (ref 4–10)

## 2019-09-10 RX ADMIN — DOCUSATE SODIUM SCH MG: 100 CAPSULE, LIQUID FILLED ORAL at 08:01

## 2019-09-10 RX ADMIN — PROBIOTIC PRODUCT - TAB SCH EA: TAB at 08:01

## 2019-09-10 RX ADMIN — ACETAMINOPHEN SCH MG: 500 TABLET ORAL at 16:19

## 2019-09-10 RX ADMIN — ACETAMINOPHEN SCH MG: 500 TABLET ORAL at 21:02

## 2019-09-10 RX ADMIN — TOLTERODINE SCH MG: 2 CAPSULE, EXTENDED RELEASE ORAL at 21:01

## 2019-09-10 RX ADMIN — FLUTICASONE PROPIONATE SCH SPRAY: 50 SPRAY, METERED NASAL at 21:01

## 2019-09-10 RX ADMIN — MENTHOL, METHYL SALICYLATE SCH DOSE: 10; 15 CREAM TOPICAL at 16:20

## 2019-09-10 RX ADMIN — INSULIN LISPRO SCH UNITS: 100 INJECTION, SOLUTION INTRAVENOUS; SUBCUTANEOUS at 21:00

## 2019-09-10 RX ADMIN — MENTHOL, METHYL SALICYLATE SCH DOSE: 10; 15 CREAM TOPICAL at 08:03

## 2019-09-10 RX ADMIN — INSULIN LISPRO SCH UNITS: 100 INJECTION, SOLUTION INTRAVENOUS; SUBCUTANEOUS at 12:35

## 2019-09-10 RX ADMIN — LISINOPRIL SCH MG: 10 TABLET ORAL at 21:02

## 2019-09-10 RX ADMIN — CIPROFLOXACIN HYDROCHLORIDE SCH MG: 500 TABLET, FILM COATED ORAL at 17:32

## 2019-09-10 RX ADMIN — IPRATROPIUM BROMIDE AND ALBUTEROL SULFATE SCH ML: .5; 3 SOLUTION RESPIRATORY (INHALATION) at 07:23

## 2019-09-10 RX ADMIN — CLOPIDOGREL BISULFATE SCH MG: 75 TABLET ORAL at 08:02

## 2019-09-10 RX ADMIN — CIPROFLOXACIN HYDROCHLORIDE SCH MG: 500 TABLET, FILM COATED ORAL at 06:11

## 2019-09-10 RX ADMIN — MENTHOL, METHYL SALICYLATE SCH DOSE: 10; 15 CREAM TOPICAL at 21:00

## 2019-09-10 RX ADMIN — INSULIN LISPRO SCH UNITS: 100 INJECTION, SOLUTION INTRAVENOUS; SUBCUTANEOUS at 17:33

## 2019-09-10 RX ADMIN — BUPROPION HYDROCHLORIDE SCH MG: 150 TABLET, FILM COATED, EXTENDED RELEASE ORAL at 08:01

## 2019-09-10 RX ADMIN — LATANOPROST SCH DROP: 50 SOLUTION OPHTHALMIC at 21:01

## 2019-09-10 RX ADMIN — ASPIRIN 81 MG CHEWABLE TABLET SCH MG: 81 TABLET CHEWABLE at 08:01

## 2019-09-10 RX ADMIN — ACETAMINOPHEN SCH MG: 500 TABLET ORAL at 08:01

## 2019-09-10 RX ADMIN — DOCUSATE SODIUM SCH MG: 100 CAPSULE, LIQUID FILLED ORAL at 21:01

## 2019-09-10 RX ADMIN — ENOXAPARIN SODIUM SCH MG: 40 INJECTION SUBCUTANEOUS at 08:02

## 2019-09-10 RX ADMIN — NYSTATIN SCH DOSE: 100000 POWDER TOPICAL at 21:00

## 2019-09-10 RX ADMIN — NYSTATIN SCH DOSE: 100000 POWDER TOPICAL at 08:03

## 2019-09-10 RX ADMIN — PROBIOTIC PRODUCT - TAB SCH EA: TAB at 21:02

## 2019-09-10 RX ADMIN — TOLTERODINE SCH MG: 2 CAPSULE, EXTENDED RELEASE ORAL at 08:02

## 2019-09-10 RX ADMIN — PANTOPRAZOLE SODIUM SCH MG: 40 TABLET, DELAYED RELEASE ORAL at 08:01

## 2019-09-10 RX ADMIN — LIDOCAINE SCH PATCH: 50 PATCH CUTANEOUS at 21:01

## 2019-09-10 RX ADMIN — ATORVASTATIN CALCIUM SCH MG: 20 TABLET, FILM COATED ORAL at 08:01

## 2019-09-10 RX ADMIN — FLUTICASONE PROPIONATE SCH SPRAY: 50 SPRAY, METERED NASAL at 08:02

## 2019-09-10 RX ADMIN — SENNOSIDES SCH TAB: 8.6 TABLET, FILM COATED ORAL at 21:01

## 2019-09-10 RX ADMIN — PROBIOTIC PRODUCT - TAB SCH EA: TAB at 16:19

## 2019-09-10 RX ADMIN — IPRATROPIUM BROMIDE AND ALBUTEROL SULFATE SCH ML: .5; 3 SOLUTION RESPIRATORY (INHALATION) at 19:24

## 2019-09-10 RX ADMIN — INSULIN LISPRO SCH UNITS: 100 INJECTION, SOLUTION INTRAVENOUS; SUBCUTANEOUS at 08:00

## 2019-09-10 RX ADMIN — Medication SCH: at 08:03

## 2019-09-10 NOTE — IPNPDOC
Text Note


Date of Service


The patient was seen on 9/10/19.





NOTE


Subjective:


Patient is a 54-year-old  female with who transferred to ARU from [facility] for left-sided hemiparesis after she had a CVA. 





Patient was seen and examined at the bedside. Currently does not report any 

symptoms of urinary discomfort. Denies problems overnight. Has been tolerating 

her diet. Denies any chest pain, shortness of breath, abdominal pain or diarrhea





Objective:


Vitals (See below)


General: Lying in bed, no acute distress, comfortable, AAOx3


HEENT: NC, AT


CVS: RRR


Lungs: Fair air entry b/l, no discernible rhonchi, rales or wheezing upon 

auscultation


Abdomen: Soft, no evidence of distention or tenderness, obese


Extremities: No evidence of edema, - Calf tenderness


Neuro: Persistent weakness of left upper and lower extremity





Assessment and plan:


s/p Acute CVA at bilateral basal ganglia


- c/w ASA, Plavix and Atorvastatin


- Physical therapy at the direction of ARU





Dysphagia / Slurred speech - 2/2 above


- c/w level 2 diet, nectar-thick liquids





HTN


- Blood pressure remains well-controlled


- c/w HCTZ and Lisinopril





DM2


- c/w ISS


 


GIULIANO on CPAP


- c/w CPAP





Mood disorder


- c/w Fluoxetine and Bupropion 





UTI


- Clinically has had improvement of her urinary tract symptoms


- Urinalysis is consistent with urinary tract infection


- Urine cultures were positive for Escherichia coli and staph warneri


- c/w Ciprofloxacin 





GI prophylaxis


- c/w Protonix 





DVT prophylaxis


- c/w Lovenox





VS,Fishbone, I+O


VS, Fishbone, I+O


Laboratory Tests


9/10/19 06:50








Red Blood Count 5.22, Mean Corpuscular Volume 88.3, Mean Corpuscular Hemoglobin 

29.7, Mean Corpuscular Hemoglobin Concent 33.6, Red Cell Distribution Width 

13.2, Neutrophils (%) (Auto) 73.0 H, Lymphocytes (%) (Auto) 18.1 L, Monocytes 

(%) (Auto) 5.9 H, Eosinophils (%) (Auto) 1.7, Basophils (%) (Auto) 0.9, 

Neutrophils # (Auto) 7.1, Lymphocytes # (Auto) 1.8, Monocytes # (Auto) 0.6, 

Eosinophils # (Auto) 0.2, Basophils # (Auto) 0.1








Vital Signs








  Date Time  Temp Pulse Resp B/P (MAP) Pulse Ox O2 Delivery O2 Flow Rate FiO2


 


9/10/19 14:00 97.7 72 18 128/72 (90) 92   














I&O- Last 24 Hours up to 6 AM 


 


 9/10/19





 06:00


 


Intake Total 1170 ml


 


Balance 1170 ml

















KAMAR DIXON MD                Sep 10, 2019 15:03

## 2019-09-10 NOTE — IPNPDOC
PM&R Progress Note


DATE OF SERVICE:  Sep 10, 2019


Physiatrist Progress Note


Subjective:


Patient seen in the gym stating she feels well and was seen working on trunk 

control.





REVIEW OF SYSTEMS: The following is a completed review of systems and has been 

reviewed. Review of systems otherwise unremarkable.


PAIN: Patient self reports no pain


EYES: No recent vision changes


EARS, NOSE, & THROAT: +dysphagia


CARDIOVASCULAR: Denies chest pain or palpitations


PULMONARY: Denies shortness of breath, except with exertion


GASTROINTESTINAL: Denies constipation/diarrhea


GENITOURINARY: denies dysuria


MUSCULOSKELETAL: LUE weakness and LLE weakness


NEUROLOGICAL: left sided paresis, facial droop


SKIN: no rash


PSYCHIATRIC: +schizophrenia


All other review of systems found to be negative.








PHYSICAL EXAMINATION:


VITAL SIGNS: Please see below.


GENERAL: Pleasant and cooperative. No acute distress. obese


HEENT: PERRL. Extraocular movements intact. Clear conjunctiva, left sided facial

droop


CARDIOVASCULAR: Regular rate and rhythm. No murmurs, rubs, or gallops


LUNGS: decreased breath sounds, no wheezes appreciated


ABDOMEN: Soft, nontender, nondistended. Positive bowel sounds. Normal active 

bowel sounds


NEUROLOGICAL: Alert and oriented times three. Cranial nerves II through XII 

grossly intact except left facial droop. Sensation grossly intact  to light 

touch all 4 extremities


equivocal babinksi bilat, no clonus


EXTREMITIES: 5\5 strength right upper extremities. flaccid LUE, 5\5 strength 

right lower extremity. 4/5 strength in left lower extremity. 





SKIN:keratotic, RLQ of her abdomen with ecchymosis, no induration, non-TTP








ASSESSMENT:54-year-old F with past medical history of obesity, HTN, who presents

status post stroke.





PLAN:


1. Rehab: PT- strengthen/stretch/maintain ROM bilat LE, improve gait and 

endurance, family training- trunk control improving and able to take steps in 

parallel bars


OT-strengthen/stretch/maintain ROM bilat UE, improve trunk control and ADL 

management- trunk control improving


SLP- significant dysphagia, c/u puree diet with upgraded to nectar thickened  

liquids


2. Neuro: recent CVA with new onset stroke in the bilateral basal ganglia and 

corona radiata- c/u ASA and Plavix, c/u statin, f/u with [facility] stroke clinic


-prozac  q for motor recovery


3. Cardio: pmh HTN, c/u lisinopril and HCTZ- medicine consulted to assist in 

management


4. Resp: heavy smoker, c/u Duonebs, GIULIANO c/u CPAP, monitor pulse-ox and for i

nfection


-CXR 8/23/19, no infiltrate


5. Psych- pmh schizophrenia c/u Wellbutryn


6. DVT ppx: lovenox and TEDs, will order Dopplers today


7. GI ppx- protonix


8. : monitor PVRs, patient with increased urination, initial Ucx contaminated,

 s/p one time dose Fosfomycin 9/5/19, Detrol started, repeat UA + E. coli and 

Staph Warneri, started on Ciprofloxacin day 4/7


9. Pain: chronic right sided low back pain, c/u lidoderm patch to low back qHS, 

c/u standing tylenol, c/u [id number] am and 1pm and q12h prn 


10. Skin: abdominal ecchymosis likely due transfers, will monitor- Hgb stable


9. Dispo: 9/17/19 to home, progressing slowly towards goals


Allergies


Coded Allergies:  


     No Known Allergies (Verified , 8/26/04)





Vital Signs





Vital Signs








  Date Time  Temp Pulse Resp B/P (MAP) Pulse Ox O2 Delivery O2 Flow Rate FiO2


 


9/10/19 13:27   18     


 


9/10/19 06:00 97.2 60  131/73 (92) 94   











Laboratory Data


CBC/BMP


Laboratory Tests


9/10/19 06:50








Red Blood Count 5.22, Mean Corpuscular Volume 88.3, Mean Corpuscular Hemoglobin 

29.7, Mean Corpuscular Hemoglobin Concent 33.6, Red Cell Distribution Width 

13.2, Neutrophils (%) (Auto) 73.0 H, Lymphocytes (%) (Auto) 18.1 L, Monocytes 

(%) (Auto) 5.9 H, Eosinophils (%) (Auto) 1.7, Basophils (%) (Auto) 0.9, 

Neutrophils # (Auto) 7.1, Lymphocytes # (Auto) 1.8, Monocytes # (Auto) 0.6, 

Eosinophils # (Auto) 0.2, Basophils # (Auto) 0.1


Labs 24H


Laboratory Tests 2


9/9/19 17:06: Bedside Glucose (Misc Panel) 228H


9/9/19 19:30: Bedside Glucose (Misc Panel) 186H


9/10/19 05:31: Bedside Glucose (Misc Panel) 191H


9/10/19 06:50: 


Immature Granulocyte % (Auto) 0.4, White Blood Count 9.7, Red Blood Count 5.22, 

Hemoglobin 15.5, Hematocrit 46.1, Mean Corpuscular Volume 88.3, Mean Corpuscular

Hemoglobin 29.7, Mean Corpuscular Hemoglobin Concent 33.6, Red Cell Distribution

Width 13.2, Platelet Count 300, Neutrophils (%) (Auto) 73.0H, Lymphocytes (%) 

(Auto) 18.1L, Monocytes (%) (Auto) 5.9H, Eosinophils (%) (Auto) 1.7, Basophils 

(%) (Auto) 0.9, Neutrophils # (Auto) 7.1, Lymphocytes # (Auto) 1.8, Monocytes # 

(Auto) 0.6, Eosinophils # (Auto) 0.2, Basophils # (Auto) 0.1, Nucleated Red 

Blood Cells % (auto) 0.0


9/10/19 11:45: Bedside Glucose (Misc Panel) 207H





Microbiology





Microbiology


9/6/19 Urine Culture - Final, Complete


         Escherichia Coli


         Staphylococcus Warneri





Current Medications


Current Medications





Current Medications








 Medications


  (Trade)  Dose


 Ordered  Sig/Rose


 Route


 PRN Reason  Start Time


 Stop Time Status Last Admin


Dose Admin


 


 Acetaminophen


  (Tylenol Tab)  650 mg  Q4HP  PRN


 PO


 fever/MILD PAIN (PS 1-4)  8/22/19 20:15


 8/29/19 11:26 DC 8/28/19 08:30





 


 Acetaminophen


  (Tylenol Tab)  1,000 mg  TID


 PO


   8/29/19 16:00


    9/10/19 08:01





 


 Albuterol/


 Ipratropium


  (Duoneb (Ipr


 0.5mg/Alb 2.5mg))  3 ml  RBID


 NEB


   8/23/19 08:00


    9/10/19 07:23





 


 Albuterol/


 Ipratropium


  (Duoneb (Ipr


 0.5mg/Alb 2.5mg))  3 ml  RQ8H  PRN


 NEB


 WHEEZING  8/22/19 20:15


     





 


 Aspirin


  (Aspirin


 Chewable)  81 mg  DAILY


 PO


   8/23/19 09:00


    9/10/19 08:01





 


 Atorvastatin


 Calcium


  (Lipitor)  80 mg  DAILY


 PO


   8/23/19 09:00


    9/10/19 08:01





 


 Bisacodyl


  (Dulcolax


 Suppository)  10 mg  DAILYPRN  PRN


 MD


 CONSTIPATION  8/22/19 20:15


     





 


 Bupropion HCl


  (Wellbutrin Xl)  150 mg  QAM


 PO


   8/23/19 09:00


    9/10/19 08:01





 


 Ciprofloxacin


  (Cipro)  500 mg  BID@06,18


 PO


   9/7/19 11:00


 9/13/19 23:00  9/10/19 06:11





 


 Clopidogrel


 Bisulfate


  (PLAVix)  75 mg  DAILY


 PO


   8/23/19 09:00


    9/10/19 08:02





 


 Dextrose


  (Dextrose 50%)  25 ml  ASDIRECTED  PRN


 IV


 SEE LABEL COMMENTS  8/22/19 20:15


     





 


 Docusate Sodium


  (Colace)  100 mg  BID


 PO


   8/22/19 21:00


    9/10/19 08:01





 


 Enoxaparin Sodium


  (Lovenox)  40 mg  DAILY


 SC


   8/23/19 09:00


    9/10/19 08:02





 


 Fluoxetine HCl


  (PROzac)  20 mg  QHS


 PO


   8/23/19 21:00


    9/9/19 20:41





 


 Fluticasone


 Propionate


  (Flonase 0.05%


 Nasal Spray)  1 spray  BID


 NARES


   8/22/19 21:00


    9/10/19 08:02





 


 Glucagon


  (Glucagon)  1 mg  ASDIRECTED  PRN


 SC


 SEE LABEL COMMENTS  8/22/19 20:15


     





 


 Glucose


  (Glucose)  16 GM  ASDIRECTED  PRN


 PO


 SEE LABEL COMMENTS  8/22/19 20:15


     





 


 Guaifenesin


  (Robitussin Tab)  400 mg  TID


 PO


   8/23/19 16:00


    9/10/19 08:02





 


 Home Med


  (Med Rec


 Complete!)    ASDIRECTED


 XX


   8/22/19 21:15


 8/22/19 21:15 DC  





 


 Hydrochlorothiazide


  (Hydrodiuril)  25 mg  DAILY


 PO


   8/23/19 09:00


    9/10/19 08:02





 


 Insulin Human


 Lispro


  (HumaLOG INSULIN)  SEE


 PROTOCOL


 TABLE  AC


 SC


   8/23/19 07:30


    9/10/19 12:35





 


 Insulin Human


 Lispro


  (HumaLOG INSULIN)  SEE


 PROTOCOL


 TABLE  QHS


 SC


   8/22/19 21:00


     





 


 Lactobacillus


 Acidophilus


  (Bacid)  1 ea  TID


 PO


   9/5/19 09:00


    9/10/19 08:01





 


 Latanoprost


  (Xalatan 0.005%


 Op Soln)  1 drop  QHS


 OU


   8/22/19 21:00


    9/9/19 20:42





 


 Lidocaine


  (Lidoderm Patch)  2 patch  QHS


 TD


   8/28/19 21:00


    9/9/19 20:41





 


 Lisinopril


  (Prinivil)  10 mg  QHS


 PO


   8/23/19 21:00


    9/9/19 20:42





 


 Magnesium


 Hydroxide


  (Milk Of


 Magnesia)  30 ml  DAILYPRN  PRN


 PO


 CONSTIPATION  8/22/19 20:15


     





 


 Menthol/Methyl


 Salicylate


  (Bengay Cream)  1 dose  TID


 TOP


   8/30/19 09:00


    9/10/19 08:03





 


 Non-Formulary


 Medication


  (** See Comment


 Field Below **)  REMOVE


 LIDODERM


 PATCH  DAILY@0900


 XX


   8/29/19 09:00


    9/10/19 08:03





 


 Nystatin


  (Mycostatin


 Powder, Nystop)    BID


 TOP


   8/25/19 21:00


    9/10/19 08:03





 


 Oxycodone HCl


  (Roxicodone,


 Oxyir)  5 mg  BID@1000,1300


 PO


   9/4/19 10:00


 9/9/19 14:24 DC 9/9/19 12:26





 


 Oxycodone HCl


  (Roxicodone,


 Oxyir)  5 mg  Q4HP  PRN


 PO


 PAIN  8/30/19 15:00


    9/5/19 17:00





 


 Oxycodone HCl


  (Roxicodone,


 Oxyir)  5 mg  TID@0700,1000,1300


 PO


   9/10/19 07:00


    9/10/19 13:27





 


 Pantoprazole


 Sodium


  (Protonix)  40 mg  DAILY


 PO


   8/23/19 09:00


    9/10/19 08:01





 


 Senna


  (Senokot)  1 tab  QHS


 PO


   8/22/19 21:00


    9/9/19 20:41





 


 Tolterodine


 Tartrate


  (Detrol La)  2 mg  BID


 PO


   9/6/19 09:00


    9/10/19 08:02





 


 Tolterodine


 Tartrate


  (Detrol La)  2 mg  DAILY@1500,2300


 PO


   9/5/19 15:00


 9/5/19 23:01 DC 9/5/19 22:31




















JASWANT ALFARO MD         Sep 10, 2019 14:16

## 2019-09-11 VITALS — SYSTOLIC BLOOD PRESSURE: 122 MMHG | DIASTOLIC BLOOD PRESSURE: 64 MMHG

## 2019-09-11 VITALS — DIASTOLIC BLOOD PRESSURE: 69 MMHG | SYSTOLIC BLOOD PRESSURE: 116 MMHG

## 2019-09-11 VITALS — SYSTOLIC BLOOD PRESSURE: 120 MMHG | DIASTOLIC BLOOD PRESSURE: 70 MMHG

## 2019-09-11 RX ADMIN — FLUTICASONE PROPIONATE SCH SPRAY: 50 SPRAY, METERED NASAL at 09:18

## 2019-09-11 RX ADMIN — SENNOSIDES SCH TAB: 8.6 TABLET, FILM COATED ORAL at 20:23

## 2019-09-11 RX ADMIN — INSULIN LISPRO SCH UNITS: 100 INJECTION, SOLUTION INTRAVENOUS; SUBCUTANEOUS at 09:15

## 2019-09-11 RX ADMIN — MENTHOL, METHYL SALICYLATE SCH DOSE: 10; 15 CREAM TOPICAL at 09:18

## 2019-09-11 RX ADMIN — ACETAMINOPHEN SCH MG: 500 TABLET ORAL at 17:18

## 2019-09-11 RX ADMIN — ATORVASTATIN CALCIUM SCH MG: 20 TABLET, FILM COATED ORAL at 09:17

## 2019-09-11 RX ADMIN — INSULIN LISPRO SCH UNITS: 100 INJECTION, SOLUTION INTRAVENOUS; SUBCUTANEOUS at 20:32

## 2019-09-11 RX ADMIN — LISINOPRIL SCH MG: 10 TABLET ORAL at 20:22

## 2019-09-11 RX ADMIN — NYSTATIN SCH DOSE: 100000 POWDER TOPICAL at 20:26

## 2019-09-11 RX ADMIN — PROBIOTIC PRODUCT - TAB SCH EA: TAB at 20:21

## 2019-09-11 RX ADMIN — INSULIN LISPRO SCH UNITS: 100 INJECTION, SOLUTION INTRAVENOUS; SUBCUTANEOUS at 17:19

## 2019-09-11 RX ADMIN — BUPROPION HYDROCHLORIDE SCH MG: 150 TABLET, FILM COATED, EXTENDED RELEASE ORAL at 09:17

## 2019-09-11 RX ADMIN — PROBIOTIC PRODUCT - TAB SCH EA: TAB at 17:18

## 2019-09-11 RX ADMIN — ACETAMINOPHEN SCH MG: 500 TABLET ORAL at 20:24

## 2019-09-11 RX ADMIN — INSULIN LISPRO SCH UNITS: 100 INJECTION, SOLUTION INTRAVENOUS; SUBCUTANEOUS at 12:25

## 2019-09-11 RX ADMIN — IPRATROPIUM BROMIDE AND ALBUTEROL SULFATE SCH ML: .5; 3 SOLUTION RESPIRATORY (INHALATION) at 18:47

## 2019-09-11 RX ADMIN — CIPROFLOXACIN HYDROCHLORIDE SCH MG: 500 TABLET, FILM COATED ORAL at 17:18

## 2019-09-11 RX ADMIN — LIDOCAINE SCH PATCH: 50 PATCH CUTANEOUS at 20:25

## 2019-09-11 RX ADMIN — MENTHOL, METHYL SALICYLATE SCH DOSE: 10; 15 CREAM TOPICAL at 16:00

## 2019-09-11 RX ADMIN — Medication SCH: at 09:19

## 2019-09-11 RX ADMIN — CLOPIDOGREL BISULFATE SCH MG: 75 TABLET ORAL at 09:16

## 2019-09-11 RX ADMIN — ACETAMINOPHEN SCH MG: 500 TABLET ORAL at 09:18

## 2019-09-11 RX ADMIN — LATANOPROST SCH DROP: 50 SOLUTION OPHTHALMIC at 20:25

## 2019-09-11 RX ADMIN — ENOXAPARIN SODIUM SCH MG: 40 INJECTION SUBCUTANEOUS at 09:15

## 2019-09-11 RX ADMIN — MENTHOL, METHYL SALICYLATE SCH DOSE: 10; 15 CREAM TOPICAL at 20:26

## 2019-09-11 RX ADMIN — FLUTICASONE PROPIONATE SCH SPRAY: 50 SPRAY, METERED NASAL at 20:25

## 2019-09-11 RX ADMIN — DOCUSATE SODIUM SCH MG: 100 CAPSULE, LIQUID FILLED ORAL at 20:23

## 2019-09-11 RX ADMIN — IPRATROPIUM BROMIDE AND ALBUTEROL SULFATE SCH ML: .5; 3 SOLUTION RESPIRATORY (INHALATION) at 07:39

## 2019-09-11 RX ADMIN — TOLTERODINE SCH MG: 2 CAPSULE, EXTENDED RELEASE ORAL at 09:16

## 2019-09-11 RX ADMIN — ASPIRIN 81 MG CHEWABLE TABLET SCH MG: 81 TABLET CHEWABLE at 09:16

## 2019-09-11 RX ADMIN — NYSTATIN SCH DOSE: 100000 POWDER TOPICAL at 09:19

## 2019-09-11 RX ADMIN — PANTOPRAZOLE SODIUM SCH MG: 40 TABLET, DELAYED RELEASE ORAL at 09:17

## 2019-09-11 RX ADMIN — DOCUSATE SODIUM SCH MG: 100 CAPSULE, LIQUID FILLED ORAL at 09:16

## 2019-09-11 RX ADMIN — PROBIOTIC PRODUCT - TAB SCH EA: TAB at 09:17

## 2019-09-11 RX ADMIN — TOLTERODINE SCH MG: 2 CAPSULE, EXTENDED RELEASE ORAL at 20:22

## 2019-09-11 RX ADMIN — CIPROFLOXACIN HYDROCHLORIDE SCH MG: 500 TABLET, FILM COATED ORAL at 05:57

## 2019-09-12 VITALS — SYSTOLIC BLOOD PRESSURE: 127 MMHG | DIASTOLIC BLOOD PRESSURE: 74 MMHG

## 2019-09-12 VITALS — DIASTOLIC BLOOD PRESSURE: 68 MMHG | SYSTOLIC BLOOD PRESSURE: 126 MMHG

## 2019-09-12 VITALS — DIASTOLIC BLOOD PRESSURE: 66 MMHG | SYSTOLIC BLOOD PRESSURE: 119 MMHG

## 2019-09-12 LAB
BASOPHILS # BLD AUTO: 0.1 10^3/UL (ref 0–0.2)
BASOPHILS NFR BLD AUTO: 0.9 % (ref 0–1)
BUN SERPL-MCNC: 16 MG/DL (ref 7–18)
CALCIUM SERPL-MCNC: 10.2 MG/DL (ref 8.5–10.1)
CHLORIDE SERPL-SCNC: 98 MEQ/L (ref 98–107)
CO2 SERPL-SCNC: 26 MEQ/L (ref 21–32)
CREAT SERPL-MCNC: 0.84 MG/DL (ref 0.55–1.3)
EOSINOPHIL # BLD AUTO: 0.1 10^3/UL (ref 0–0.5)
EOSINOPHIL NFR BLD AUTO: 1.1 % (ref 0–3)
GFR SERPL CREATININE-BSD FRML MDRD: > 60 ML/MIN/{1.73_M2} (ref 51–?)
GLUCOSE SERPL-MCNC: 138 MG/DL (ref 70–100)
HCT VFR BLD AUTO: 45.3 % (ref 36–47)
HGB BLD-MCNC: 15.6 G/DL (ref 12–15.5)
LYMPHOCYTES # BLD AUTO: 1.9 10^3/UL (ref 1.5–5)
LYMPHOCYTES NFR BLD AUTO: 17.1 % (ref 24–44)
MCH RBC QN AUTO: 30.1 PG (ref 27–33)
MCHC RBC AUTO-ENTMCNC: 34.4 G/DL (ref 32–36.5)
MCV RBC AUTO: 87.3 FL (ref 80–96)
MONOCYTES # BLD AUTO: 0.5 10^3/UL (ref 0–0.8)
MONOCYTES NFR BLD AUTO: 4.8 % (ref 0–5)
NEUTROPHILS # BLD AUTO: 8.4 10^3/UL (ref 1.5–8.5)
NEUTROPHILS NFR BLD AUTO: 75.7 % (ref 36–66)
PLATELET # BLD AUTO: 330 10^3/UL (ref 150–450)
POTASSIUM SERPL-SCNC: 4.4 MEQ/L (ref 3.5–5.1)
RBC # BLD AUTO: 5.19 10^6/UL (ref 4–5.4)
SODIUM SERPL-SCNC: 135 MEQ/L (ref 136–145)
WBC # BLD AUTO: 11.1 10^3/UL (ref 4–10)

## 2019-09-12 RX ADMIN — INSULIN LISPRO SCH UNITS: 100 INJECTION, SOLUTION INTRAVENOUS; SUBCUTANEOUS at 20:35

## 2019-09-12 RX ADMIN — CLOPIDOGREL BISULFATE SCH MG: 75 TABLET ORAL at 08:27

## 2019-09-12 RX ADMIN — DOCUSATE SODIUM SCH MG: 100 CAPSULE, LIQUID FILLED ORAL at 20:34

## 2019-09-12 RX ADMIN — ACETAMINOPHEN SCH MG: 500 TABLET ORAL at 17:22

## 2019-09-12 RX ADMIN — IPRATROPIUM BROMIDE AND ALBUTEROL SULFATE SCH ML: .5; 3 SOLUTION RESPIRATORY (INHALATION) at 07:50

## 2019-09-12 RX ADMIN — PROBIOTIC PRODUCT - TAB SCH EA: TAB at 20:34

## 2019-09-12 RX ADMIN — ENOXAPARIN SODIUM SCH MG: 40 INJECTION SUBCUTANEOUS at 08:27

## 2019-09-12 RX ADMIN — PROBIOTIC PRODUCT - TAB SCH EA: TAB at 17:21

## 2019-09-12 RX ADMIN — Medication SCH: at 08:29

## 2019-09-12 RX ADMIN — LATANOPROST SCH DROP: 50 SOLUTION OPHTHALMIC at 20:35

## 2019-09-12 RX ADMIN — MENTHOL, METHYL SALICYLATE SCH DOSE: 10; 15 CREAM TOPICAL at 08:29

## 2019-09-12 RX ADMIN — ATORVASTATIN CALCIUM SCH MG: 20 TABLET, FILM COATED ORAL at 08:29

## 2019-09-12 RX ADMIN — MENTHOL, METHYL SALICYLATE SCH DOSE: 10; 15 CREAM TOPICAL at 17:22

## 2019-09-12 RX ADMIN — PANTOPRAZOLE SODIUM SCH MG: 40 TABLET, DELAYED RELEASE ORAL at 08:27

## 2019-09-12 RX ADMIN — LIDOCAINE SCH PATCH: 50 PATCH CUTANEOUS at 20:36

## 2019-09-12 RX ADMIN — DOCUSATE SODIUM SCH MG: 100 CAPSULE, LIQUID FILLED ORAL at 08:27

## 2019-09-12 RX ADMIN — ACETAMINOPHEN SCH MG: 500 TABLET ORAL at 08:28

## 2019-09-12 RX ADMIN — NYSTATIN SCH DOSE: 100000 POWDER TOPICAL at 09:12

## 2019-09-12 RX ADMIN — IPRATROPIUM BROMIDE AND ALBUTEROL SULFATE SCH ML: .5; 3 SOLUTION RESPIRATORY (INHALATION) at 19:50

## 2019-09-12 RX ADMIN — LISINOPRIL SCH MG: 10 TABLET ORAL at 20:34

## 2019-09-12 RX ADMIN — SENNOSIDES SCH TAB: 8.6 TABLET, FILM COATED ORAL at 20:34

## 2019-09-12 RX ADMIN — FLUTICASONE PROPIONATE SCH SPRAY: 50 SPRAY, METERED NASAL at 20:35

## 2019-09-12 RX ADMIN — BUPROPION HYDROCHLORIDE SCH MG: 150 TABLET, FILM COATED, EXTENDED RELEASE ORAL at 08:28

## 2019-09-12 RX ADMIN — MENTHOL, METHYL SALICYLATE SCH DOSE: 10; 15 CREAM TOPICAL at 20:36

## 2019-09-12 RX ADMIN — PROBIOTIC PRODUCT - TAB SCH EA: TAB at 08:27

## 2019-09-12 RX ADMIN — INSULIN LISPRO SCH UNITS: 100 INJECTION, SOLUTION INTRAVENOUS; SUBCUTANEOUS at 08:26

## 2019-09-12 RX ADMIN — NYSTATIN SCH DOSE: 100000 POWDER TOPICAL at 20:37

## 2019-09-12 RX ADMIN — TOLTERODINE SCH MG: 2 CAPSULE, EXTENDED RELEASE ORAL at 08:27

## 2019-09-12 RX ADMIN — INSULIN LISPRO SCH UNITS: 100 INJECTION, SOLUTION INTRAVENOUS; SUBCUTANEOUS at 12:04

## 2019-09-12 RX ADMIN — FLUTICASONE PROPIONATE SCH SPRAY: 50 SPRAY, METERED NASAL at 08:29

## 2019-09-12 RX ADMIN — INSULIN LISPRO SCH UNITS: 100 INJECTION, SOLUTION INTRAVENOUS; SUBCUTANEOUS at 17:23

## 2019-09-12 RX ADMIN — CIPROFLOXACIN HYDROCHLORIDE SCH MG: 500 TABLET, FILM COATED ORAL at 17:22

## 2019-09-12 RX ADMIN — ASPIRIN 81 MG CHEWABLE TABLET SCH MG: 81 TABLET CHEWABLE at 08:27

## 2019-09-12 RX ADMIN — TOLTERODINE SCH MG: 2 CAPSULE, EXTENDED RELEASE ORAL at 20:34

## 2019-09-12 RX ADMIN — CIPROFLOXACIN HYDROCHLORIDE SCH MG: 500 TABLET, FILM COATED ORAL at 06:12

## 2019-09-12 RX ADMIN — ACETAMINOPHEN SCH MG: 500 TABLET ORAL at 20:34

## 2019-09-12 NOTE — IPNPDOC
PM&R Progress Note


DATE OF SERVICE:  Sep 11, 2019


Physiatrist Progress Note


Subjective:


Patient seen in her room stating she is feeling stronger and is in better 

control of her trunk. She belives ehr family will be able to transfer her safely

and is eager to go home as soon as possible.





REVIEW OF SYSTEMS: The following is a completed review of systems and has been 

reviewed. Review of systems otherwise unremarkable.


PAIN: Patient self reports no pain


EYES: No recent vision changes


EARS, NOSE, & THROAT: +dysphagia


CARDIOVASCULAR: Denies chest pain or palpitations


PULMONARY: Denies shortness of breath, except with exertion


GASTROINTESTINAL: Denies constipation/diarrhea


GENITOURINARY: denies dysuria


MUSCULOSKELETAL: LUE weakness and LLE weakness


NEUROLOGICAL: left sided paresis, facial droop


SKIN: no rash


PSYCHIATRIC: +schizophrenia


All other review of systems found to be negative.








PHYSICAL EXAMINATION:


VITAL SIGNS: Please see below.


GENERAL: Pleasant and cooperative. No acute distress. obese


HEENT: PERRL. Extraocular movements intact. Clear conjunctiva, left sided facial

droop


CARDIOVASCULAR: Regular rate and rhythm. No murmurs, rubs, or gallops


LUNGS: decreased breath sounds, no wheezes appreciated


ABDOMEN: Soft, nontender, nondistended. Positive bowel sounds. Normal active 

bowel sounds


NEUROLOGICAL: Alert and oriented times three. Cranial nerves II through XII 

grossly intact except left facial droop. Sensation grossly intact  to light 

touch all 4 extremities


equivocal babinksi bilat, no clonus


EXTREMITIES: 5\5 strength right upper extremities. flaccid LUE, 5\5 strength 

right lower extremity. 4/5 strength in left lower extremity. 





SKIN:keratotic, RLQ of her abdomen with ecchymosis, no induration, non-TTP








ASSESSMENT:54-year-old F with past medical history of obesity, HTN, who presents

status post stroke.





PLAN:


1. Rehab: PT- strengthen/stretch/maintain ROM bilat LE, improve gait and 

endurance, family training- trunk control improving and able to take steps in 

parallel bars


OT-strengthen/stretch/maintain ROM bilat UE, improve trunk control and ADL 

management- trunk control improving


SLP- significant dysphagia, c/u puree diet with upgraded to nectar thickened  

liquids


2. Neuro: recent CVA with new onset stroke in the bilateral basal ganglia and 

corona radiata- c/u ASA and Plavix, c/u statin, f/u with [facility] stroke [facility]


-proza  qhs for motor recovery


3. Cardio: pmh HTN, c/u lisinopril and HCTZ- medicine consulted to assist in 

management


4. Resp: heavy smoker, c/u Duonebs, GIULIANO c/u CPAP, monitor pulse-ox and for 

infection


-CXR 8/23/19, no infiltrate


5. Psych- pmh schizophrenia c/u Wellbutryn


6. DVT ppx: lovenox and TEDs, Dopplers negative


7. GI ppx- protonix


8. : monitor PVRs, patient with increased urination, initial Ucx contaminated,

 s/p one time dose Fosfomycin 9/5/19, Detrol started, repeat UA + E. coli and 

Staph Warneri, started on Ciprofloxacin day 5/7


9. Pain: chronic right sided low back pain, c/u lidoderm patch to low back qHS, 

c/u standing tylenol, c/u [id number], 10 am and 1pm and q12h prn 


10. Skin: abdominal ecchymosis likely due transfers, will monitor- Hgb stable


9. Dispo: 9/17/19 to home, progressing slowly towards goals


Allergies


Coded Allergies:  


     No Known Allergies (Verified , 8/26/04)





Vital Signs





Vital Signs








  Date Time  Temp Pulse Resp B/P (MAP) Pulse Ox O2 Delivery O2 Flow Rate FiO2


 


9/12/19 10:24   22     


 


9/12/19 05:00 97.0 61  119/66 (83) 95   











Laboratory Data


CBC/BMP


Laboratory Tests


9/12/19 10:45








Red Blood Count 5.19, Mean Corpuscular Volume 87.3, Mean Corpuscular Hemoglobin 

30.1, Mean Corpuscular Hemoglobin Concent 34.4, Red Cell Distribution Width 

13.2, Neutrophils (%) (Auto) 75.7 H, Lymphocytes (%) (Auto) 17.1 L, Monocytes 

(%) (Auto) 4.8, Eosinophils (%) (Auto) 1.1, Basophils (%) (Auto) 0.9, 

Neutrophils # (Auto) 8.4, Lymphocytes # (Auto) 1.9, Monocytes # (Auto) 0.5, 

Eosinophils # (Auto) 0.1, Basophils # (Auto) 0.1, Calcium Level 10.2 H


Labs 24H


Laboratory Tests 2


9/11/19 16:40: Bedside Glucose (Misc Panel) 148H


9/11/19 19:22: Bedside Glucose (Misc Panel) 167H


9/12/19 05:26: Bedside Glucose (Misc Panel) 167H


9/12/19 10:45: 


Immature Granulocyte % (Auto) 0.4, White Blood Count 11.1H, Red Blood Count 

5.19, Hemoglobin 15.6H, Hematocrit 45.3, Mean Corpuscular Volume 87.3, Mean 

Corpuscular Hemoglobin 30.1, Mean Corpuscular Hemoglobin Concent 34.4, Red Cell 

Distribution Width 13.2, Platelet Count 330, Neutrophils (%) (Auto) 75.7H, 

Lymphocytes (%) (Auto) 17.1L, Monocytes (%) (Auto) 4.8, Eosinophils (%) (Auto) 

1.1, Basophils (%) (Auto) 0.9, Neutrophils # (Auto) 8.4, Lymphocytes # (Auto) 

1.9, Monocytes # (Auto) 0.5, Eosinophils # (Auto) 0.1, Basophils # (Auto) 0.1, 

Nucleated Red Blood Cells % (auto) 0.0, Anion Gap 11, Glomerular Filtration Rate

> 60.0, Blood Urea Nitrogen 16, Creatinine 0.84, Sodium Level 135L, Potassium 

Level 4.4, Chloride Level 98, Carbon Dioxide Level 26, Calcium Level 10.2H





Microbiology





Microbiology


9/6/19 Urine Culture - Final, Complete


         Escherichia Coli


         Staphylococcus Warneri





Current Medications


Current Medications





Current Medications








 Medications


  (Trade)  Dose


 Ordered  Sig/Rose


 Route


 PRN Reason  Start Time


 Stop Time Status Last Admin


Dose Admin


 


 Acetaminophen


  (Tylenol Tab)  650 mg  Q4HP  PRN


 PO


 fever/MILD PAIN (PS 1-4)  8/22/19 20:15


 8/29/19 11:26 DC 8/28/19 08:30





 


 Acetaminophen


  (Tylenol Tab)  1,000 mg  TID


 PO


   8/29/19 16:00


    9/12/19 08:28





 


 Albuterol/


 Ipratropium


  (Duoneb (Ipr


 0.5mg/Alb 2.5mg))  3 ml  RBID


 NEB


   8/23/19 08:00


    9/11/19 18:47





 


 Albuterol/


 Ipratropium


  (Duoneb (Ipr


 0.5mg/Alb 2.5mg))  3 ml  RQ8H  PRN


 NEB


 WHEEZING  8/22/19 20:15


     





 


 Aspirin


  (Aspirin


 Chewable)  81 mg  DAILY


 PO


   8/23/19 09:00


    9/12/19 08:27





 


 Atorvastatin


 Calcium


  (Lipitor)  80 mg  DAILY


 PO


   8/23/19 09:00


    9/12/19 08:29





 


 Bisacodyl


  (Dulcolax


 Suppository)  10 mg  DAILYPRN  PRN


 IN


 CONSTIPATION  8/22/19 20:15


     





 


 Bupropion HCl


  (Wellbutrin Xl)  150 mg  QAM


 PO


   8/23/19 09:00


    9/12/19 08:28





 


 Ciprofloxacin


  (Cipro)  500 mg  BID@06,18


 PO


   9/7/19 11:00


 9/13/19 23:00  9/12/19 06:12





 


 Clopidogrel


 Bisulfate


  (PLAVix)  75 mg  DAILY


 PO


   8/23/19 09:00


    9/12/19 08:27





 


 Dextrose


  (Dextrose 50%)  25 ml  ASDIRECTED  PRN


 IV


 SEE LABEL COMMENTS  8/22/19 20:15


     





 


 Docusate Sodium


  (Colace)  100 mg  BID


 PO


   8/22/19 21:00


    9/12/19 08:27





 


 Enoxaparin Sodium


  (Lovenox)  40 mg  DAILY


 SC


   8/23/19 09:00


    9/12/19 08:27





 


 Fluoxetine HCl


  (PROzac)  20 mg  QHS


 PO


   8/23/19 21:00


    9/11/19 20:23





 


 Fluticasone


 Propionate


  (Flonase 0.05%


 Nasal Spray)  1 spray  BID


 NARES


   8/22/19 21:00


    9/12/19 08:29





 


 Glucagon


  (Glucagon)  1 mg  ASDIRECTED  PRN


 SC


 SEE LABEL COMMENTS  8/22/19 20:15


     





 


 Glucose


  (Glucose)  16 GM  ASDIRECTED  PRN


 PO


 SEE LABEL COMMENTS  8/22/19 20:15


     





 


 Guaifenesin


  (Robitussin Tab)  400 mg  TID


 PO


   8/23/19 16:00


    9/12/19 08:27





 


 Home Med


  (Med Rec


 Complete!)    ASDIRECTED


 XX


   8/22/19 21:15


 8/22/19 21:15 DC  





 


 Hydrochlorothiazide


  (Hydrodiuril)  25 mg  DAILY


 PO


   8/23/19 09:00


    9/12/19 08:27





 


 Insulin Human


 Lispro


  (HumaLOG INSULIN)  SEE


 PROTOCOL


 TABLE  AC


 SC


   8/23/19 07:30


    9/12/19 12:04





 


 Insulin Human


 Lispro


  (HumaLOG INSULIN)  SEE


 PROTOCOL


 TABLE  QHS


 SC


   8/22/19 21:00


     





 


 Lactobacillus


 Acidophilus


  (Bacid)  1 ea  TID


 PO


   9/5/19 09:00


    9/12/19 08:27





 


 Latanoprost


  (Xalatan 0.005%


 Op Soln)  1 drop  QHS


 OU


   8/22/19 21:00


    9/11/19 20:25





 


 Lidocaine


  (Lidoderm Patch)  2 patch  QHS


 TD


   8/28/19 21:00


    9/11/19 20:25





 


 Lisinopril


  (Prinivil)  10 mg  QHS


 PO


   8/23/19 21:00


    9/11/19 20:22





 


 Magnesium


 Hydroxide


  (Milk Of


 Magnesia)  30 ml  DAILYPRN  PRN


 PO


 CONSTIPATION  8/22/19 20:15


     





 


 Menthol/Methyl


 Salicylate


  (Bengay Cream)  1 dose  TID


 TOP


   8/30/19 09:00


    9/12/19 08:29





 


 Miscellaneous


  (Unresolved


 Clarification


 Entry)  SEE LABEL


 COMMENTS  DAILY


 XX


   9/11/19 09:00


 9/11/19 11:28 DC  





 


 Non-Formulary


 Medication


  (** See Comment


 Field Below **)  REMOVE


 LIDODERM


 PATCH  DAILY@0900


 XX


   8/29/19 09:00


    9/12/19 08:29





 


 Nystatin


  (Mycostatin


 Powder, Nystop)    BID


 TOP


   8/25/19 21:00


    9/12/19 09:12





 


 Oxycodone HCl


  (Roxicodone,


 Oxyir)  5 mg  BID@1000,1300


 PO


   9/4/19 10:00


 9/9/19 14:24 DC 9/9/19 12:26





 


 Oxycodone HCl


  (Roxicodone,


 Oxyir)  5 mg  Q4HP  PRN


 PO


 PAIN  8/30/19 15:00


    9/5/19 17:00





 


 Oxycodone HCl


  (Roxicodone,


 Oxyir)  5 mg  TID@0700,1000,1300


 PO


   9/10/19 07:00


    9/12/19 09:12





 


 Pantoprazole


 Sodium


  (Protonix)  40 mg  DAILY


 PO


   8/23/19 09:00


    9/12/19 08:27





 


 Senna


  (Senokot)  1 tab  QHS


 PO


   8/22/19 21:00


    9/11/19 20:23





 


 Tolterodine


 Tartrate


  (Detrol La)  2 mg  BID


 PO


   9/6/19 09:00


    9/12/19 08:27





 


 Tolterodine


 Tartrate


  (Detrol La)  2 mg  DAILY@1500,2300


 PO


   9/5/19 15:00


 9/5/19 23:01 DC 9/5/19 22:31




















JASWANT ALFARO MD         Sep 12, 2019 13:45

## 2019-09-12 NOTE — IPNPDOC
PM&R Progress Note


DATE OF SERVICE:  Sep 12, 2019


Physiatrist Progress Note


Subjective:


Patient seen in her room stating she is feeling stronger and is in better 

control of her trunk. She belives ehr family will be able to transfer her safely

and is eager to go home as soon as possible.





REVIEW OF SYSTEMS: The following is a completed review of systems and has been 

reviewed. Review of systems otherwise unremarkable.


PAIN: Patient self reports no pain


EYES: No recent vision changes


EARS, NOSE, & THROAT: +dysphagia


CARDIOVASCULAR: Denies chest pain or palpitations


PULMONARY: Denies shortness of breath, except with exertion


GASTROINTESTINAL: Denies constipation/diarrhea


GENITOURINARY: denies dysuria


MUSCULOSKELETAL: LUE weakness and LLE weakness


NEUROLOGICAL: left sided paresis, facial droop


SKIN: no rash


PSYCHIATRIC: +schizophrenia


All other review of systems found to be negative.








PHYSICAL EXAMINATION:


VITAL SIGNS: Please see below.


GENERAL: Pleasant and cooperative. No acute distress. obese


HEENT: PERRL. Extraocular movements intact. Clear conjunctiva, left sided facial

droop


CARDIOVASCULAR: Regular rate and rhythm. No murmurs, rubs, or gallops


LUNGS: decreased breath sounds, no wheezes appreciated


ABDOMEN: Soft, nontender, nondistended. Positive bowel sounds. Normal active 

bowel sounds


NEUROLOGICAL: Alert and oriented times three. Cranial nerves II through XII 

grossly intact except left facial droop. Sensation grossly intact  to light 

touch all 4 extremities


equivocal babinksi bilat, no clonus


EXTREMITIES: 5\5 strength right upper extremities. flaccid LUE, 5\5 strength 

right lower extremity. 4/5 strength in left lower extremity. 





SKIN:keratotic, RLQ of her abdomen with ecchymosis, no induration, non-TTP








ASSESSMENT:54-year-old F with past medical history of obesity, HTN, who presents

status post stroke.





PLAN:


1. Rehab: PT- strengthen/stretch/maintain ROM bilat LE, improve gait and 

endurance, family training- trunk control improving and able to take steps in 

parallel bars


OT-strengthen/stretch/maintain ROM bilat UE, improve trunk control and ADL 

management- trunk control improving


-encouraged to work on mat exercises to engage core better to limit back spasms


SLP- significant dysphagia, MBS today, upgrded to level two, continue nectar


2. Neuro: recent CVA with new onset stroke in the bilateral basal ganglia and 

corona radiata- c/u ASA and Plavix, c/u statin, f/u with [facility] stroke [facility]


-prozac  qhs for motor recovery


3. Cardio: pmh HTN, c/u lisinopril and HCTZ- medicine consulted to assist in ma

cesar


4. Resp: heavy smoker, c/u Duonebs, GIULIANO c/u CPAP, monitor pulse-ox and for 

infection


-CXR 8/23/19, no infiltrate


5. Psych- pmh schizophrenia c/u Wellbutryn


6. DVT ppx: lovenox and TEDs, Dopplers negative


7. GI ppx- protonix


8. : monitor PVRs, patient with increased urination, initial Ucx contaminated,

 s/p one time dose Fosfomycin 9/5/19, Detrol started, repeat UA + E. coli and 

Staph Warneri, started on Ciprofloxacin day 5/7


9. Pain: chronic right sided low back pain, c/u lidoderm patch to low back qHS, 

c/u standing tylenol, c/u [id number], 10 am and 1pm and q12h prn 


10. Skin: abdominal ecchymosis likely due transfers, will monitor- Hgb stable


9. Dispo: 9/17/19 to home, progressing slowly towards goals


Allergies


Coded Allergies:  


     No Known Allergies (Verified , 8/26/04)





Vital Signs





Vital Signs








  Date Time  Temp Pulse Resp B/P (MAP) Pulse Ox O2 Delivery O2 Flow Rate FiO2


 


9/12/19 10:24   22     


 


9/12/19 05:00 97.0 61  119/66 (83) 95   











Laboratory Data


CBC/BMP


Laboratory Tests


9/12/19 10:45








Red Blood Count 5.19, Mean Corpuscular Volume 87.3, Mean Corpuscular Hemoglobin 

30.1, Mean Corpuscular Hemoglobin Concent 34.4, Red Cell Distribution Width 13.

2, Neutrophils (%) (Auto) 75.7 H, Lymphocytes (%) (Auto) 17.1 L, Monocytes (%) 

(Auto) 4.8, Eosinophils (%) (Auto) 1.1, Basophils (%) (Auto) 0.9, Neutrophils # 

(Auto) 8.4, Lymphocytes # (Auto) 1.9, Monocytes # (Auto) 0.5, Eosinophils # 

(Auto) 0.1, Basophils # (Auto) 0.1, Calcium Level 10.2 H


Labs 24H


Laboratory Tests 2


9/11/19 16:40: Bedside Glucose (Misc Panel) 148H


9/11/19 19:22: Bedside Glucose (Misc Panel) 167H


9/12/19 05:26: Bedside Glucose (Misc Panel) 167H


9/12/19 10:45: 


Immature Granulocyte % (Auto) 0.4, White Blood Count 11.1H, Red Blood Count 

5.19, Hemoglobin 15.6H, Hematocrit 45.3, Mean Corpuscular Volume 87.3, Mean 

Corpuscular Hemoglobin 30.1, Mean Corpuscular Hemoglobin Concent 34.4, Red Cell 

Distribution Width 13.2, Platelet Count 330, Neutrophils (%) (Auto) 75.7H, Lymph

ocytes (%) (Auto) 17.1L, Monocytes (%) (Auto) 4.8, Eosinophils (%) (Auto) 1.1, 

Basophils (%) (Auto) 0.9, Neutrophils # (Auto) 8.4, Lymphocytes # (Auto) 1.9, 

Monocytes # (Auto) 0.5, Eosinophils # (Auto) 0.1, Basophils # (Auto) 0.1, 

Nucleated Red Blood Cells % (auto) 0.0, Anion Gap 11, Glomerular Filtration Rate

> 60.0, Blood Urea Nitrogen 16, Creatinine 0.84, Sodium Level 135L, Potassium 

Level 4.4, Chloride Level 98, Carbon Dioxide Level 26, Calcium Level 10.2H





Microbiology





Microbiology


9/6/19 Urine Culture - Final, Complete


         Escherichia Coli


         Staphylococcus Warneri





Current Medications


Current Medications





Current Medications








 Medications


  (Trade)  Dose


 Ordered  Sig/Rose


 Route


 PRN Reason  Start Time


 Stop Time Status Last Admin


Dose Admin


 


 Acetaminophen


  (Tylenol Tab)  650 mg  Q4HP  PRN


 PO


 fever/MILD PAIN (PS 1-4)  8/22/19 20:15


 8/29/19 11:26 DC 8/28/19 08:30





 


 Acetaminophen


  (Tylenol Tab)  1,000 mg  TID


 PO


   8/29/19 16:00


    9/12/19 08:28





 


 Albuterol/


 Ipratropium


  (Duoneb (Ipr


 0.5mg/Alb 2.5mg))  3 ml  RBID


 NEB


   8/23/19 08:00


    9/11/19 18:47





 


 Albuterol/


 Ipratropium


  (Duoneb (Ipr


 0.5mg/Alb 2.5mg))  3 ml  RQ8H  PRN


 NEB


 WHEEZING  8/22/19 20:15


     





 


 Aspirin


  (Aspirin


 Chewable)  81 mg  DAILY


 PO


   8/23/19 09:00


    9/12/19 08:27





 


 Atorvastatin


 Calcium


  (Lipitor)  80 mg  DAILY


 PO


   8/23/19 09:00


    9/12/19 08:29





 


 Bisacodyl


  (Dulcolax


 Suppository)  10 mg  DAILYPRN  PRN


 GA


 CONSTIPATION  8/22/19 20:15


     





 


 Bupropion HCl


  (Wellbutrin Xl)  150 mg  QAM


 PO


   8/23/19 09:00


    9/12/19 08:28





 


 Ciprofloxacin


  (Cipro)  500 mg  BID@06,18


 PO


   9/7/19 11:00


 9/13/19 23:00  9/12/19 06:12





 


 Clopidogrel


 Bisulfate


  (PLAVix)  75 mg  DAILY


 PO


   8/23/19 09:00


    9/12/19 08:27





 


 Dextrose


  (Dextrose 50%)  25 ml  ASDIRECTED  PRN


 IV


 SEE LABEL COMMENTS  8/22/19 20:15


     





 


 Docusate Sodium


  (Colace)  100 mg  BID


 PO


   8/22/19 21:00


    9/12/19 08:27





 


 Enoxaparin Sodium


  (Lovenox)  40 mg  DAILY


 SC


   8/23/19 09:00


    9/12/19 08:27





 


 Fluoxetine HCl


  (PROzac)  20 mg  QHS


 PO


   8/23/19 21:00


    9/11/19 20:23





 


 Fluticasone


 Propionate


  (Flonase 0.05%


 Nasal Spray)  1 spray  BID


 NARES


   8/22/19 21:00


    9/12/19 08:29





 


 Glucagon


  (Glucagon)  1 mg  ASDIRECTED  PRN


 SC


 SEE LABEL COMMENTS  8/22/19 20:15


     





 


 Glucose


  (Glucose)  16 GM  ASDIRECTED  PRN


 PO


 SEE LABEL COMMENTS  8/22/19 20:15


     





 


 Guaifenesin


  (Robitussin Tab)  400 mg  TID


 PO


   8/23/19 16:00


    9/12/19 08:27





 


 Home Med


  (Med Rec


 Complete!)    ASDIRECTED


 XX


   8/22/19 21:15


 8/22/19 21:15 DC  





 


 Hydrochlorothiazide


  (Hydrodiuril)  25 mg  DAILY


 PO


   8/23/19 09:00


    9/12/19 08:27





 


 Insulin Human


 Lispro


  (HumaLOG INSULIN)  SEE


 PROTOCOL


 TABLE  AC


 SC


   8/23/19 07:30


    9/12/19 12:04





 


 Insulin Human


 Lispro


  (HumaLOG INSULIN)  SEE


 PROTOCOL


 TABLE  QHS


 SC


   8/22/19 21:00


     





 


 Lactobacillus


 Acidophilus


  (Bacid)  1 ea  TID


 PO


   9/5/19 09:00


    9/12/19 08:27





 


 Latanoprost


  (Xalatan 0.005%


 Op Soln)  1 drop  QHS


 OU


   8/22/19 21:00


    9/11/19 20:25





 


 Lidocaine


  (Lidoderm Patch)  2 patch  QHS


 TD


   8/28/19 21:00


    9/11/19 20:25





 


 Lisinopril


  (Prinivil)  10 mg  QHS


 PO


   8/23/19 21:00


    9/11/19 20:22





 


 Magnesium


 Hydroxide


  (Milk Of


 Magnesia)  30 ml  DAILYPRN  PRN


 PO


 CONSTIPATION  8/22/19 20:15


     





 


 Menthol/Methyl


 Salicylate


  (Bengay Cream)  1 dose  TID


 TOP


   8/30/19 09:00


    9/12/19 08:29





 


 Miscellaneous


  (Unresolved


 Clarification


 Entry)  SEE LABEL


 COMMENTS  DAILY


 XX


   9/11/19 09:00


 9/11/19 11:28 DC  





 


 Non-Formulary


 Medication


  (** See Comment


 Field Below **)  REMOVE


 LIDODERM


 PATCH  DAILY@0900


 XX


   8/29/19 09:00


    9/12/19 08:29





 


 Nystatin


  (Mycostatin


 Powder, Nystop)    BID


 TOP


   8/25/19 21:00


    9/12/19 09:12





 


 Oxycodone HCl


  (Roxicodone,


 Oxyir)  5 mg  BID@1000,1300


 PO


   9/4/19 10:00


 9/9/19 14:24 DC 9/9/19 12:26





 


 Oxycodone HCl


  (Roxicodone,


 Oxyir)  5 mg  Q4HP  PRN


 PO


 PAIN  8/30/19 15:00


    9/5/19 17:00





 


 Oxycodone HCl


  (Roxicodone,


 Oxyir)  5 mg  TID@0700,1000,1300


 PO


   9/10/19 07:00


    9/12/19 09:12





 


 Pantoprazole


 Sodium


  (Protonix)  40 mg  DAILY


 PO


   8/23/19 09:00


    9/12/19 08:27





 


 Senna


  (Senokot)  1 tab  QHS


 PO


   8/22/19 21:00


    9/11/19 20:23





 


 Tolterodine


 Tartrate


  (Detrol La)  2 mg  BID


 PO


   9/6/19 09:00


    9/12/19 08:27





 


 Tolterodine


 Tartrate


  (Detrol La)  2 mg  DAILY@1500,2300


 PO


   9/5/19 15:00


 9/5/19 23:01 DC 9/5/19 22:31




















JASWANT ALFARO MD         Sep 12, 2019 13:46

## 2019-09-12 NOTE — IPNPDOC
Text Note


Date of Service


The patient was seen on 9/12/19.





NOTE


Subjective:


Patient is a 54-year-old  female with who transferred to ARU from [facility] for left-sided hemiparesis after she had a CVA. 





Patient was seen and examined while working with physical therapy. She reports 

that she's been progressing well with physical therapy. She denies chest pain, 

abdominal pain or diarrhea.





Objective:


Vitals (See below)


General: Lying in bed, no acute distress, comfortable, AAOx3


HEENT: NC, AT


CVS: RRR


Lungs: Fair bilaterally without evidence of rhonchi, rales or wheezing


Abdomen: No distention or tenderness, remains soft and is obese


Extremities: Lower extremities are free of edema, - Calf tenderness


Neuro: Left-sided weakness of upper and lower extremities





Assessment and plan:


s/p Acute CVA at bilateral basal ganglia


- c/w ASA, Plavix and Atorvastatin


- Physical therapy at the direction of ARU


- Has been progressing well





Dysphagia / Slurred speech - 2/2 above


- c/w level 2 diet, nectar-thick liquids





HTN


- Blood pressure remains well-controlled


- c/w HCTZ and Lisinopril





DM2


- c/w ISS


 


GIULIANO on CPAP


- c/w CPAP





Mood disorder


- c/w Fluoxetine and Bupropion 





UTI


- Clinically has had improvement of her urinary tract symptoms


- Urinalysis is consistent with urinary tract infection


- Urine cultures were positive for Escherichia coli and staph warneri


- c/w Ciprofloxacin 





GI prophylaxis


- c/w Protonix 





DVT prophylaxis


- c/w Lovenox





Disposition:


- Awaiting for improvement with physical therapy





VS,Hossein, I+O


VS, Rosae, I+O





Vital Signs








  Date Time  Temp Pulse Resp B/P (MAP) Pulse Ox O2 Delivery O2 Flow Rate FiO2


 


9/12/19 10:24   22     


 


9/12/19 05:00 97.0 61  119/66 (83) 95   














I&O- Last 24 Hours up to 6 AM 


 


 9/12/19





 06:00


 


Intake Total 860 ml


 


Balance 860 ml

















KAMAR DIXON MD                Sep 12, 2019 11:11

## 2019-09-13 VITALS — DIASTOLIC BLOOD PRESSURE: 72 MMHG | SYSTOLIC BLOOD PRESSURE: 131 MMHG

## 2019-09-13 VITALS — SYSTOLIC BLOOD PRESSURE: 112 MMHG | DIASTOLIC BLOOD PRESSURE: 61 MMHG

## 2019-09-13 VITALS — DIASTOLIC BLOOD PRESSURE: 65 MMHG | SYSTOLIC BLOOD PRESSURE: 122 MMHG

## 2019-09-13 LAB
BASOPHILS # BLD AUTO: 0.1 10^3/UL (ref 0–0.2)
BASOPHILS NFR BLD AUTO: 0.9 % (ref 0–1)
BUN SERPL-MCNC: 15 MG/DL (ref 7–18)
CALCIUM SERPL-MCNC: 9.7 MG/DL (ref 8.5–10.1)
CHLORIDE SERPL-SCNC: 98 MEQ/L (ref 98–107)
CO2 SERPL-SCNC: 30 MEQ/L (ref 21–32)
CREAT SERPL-MCNC: 0.84 MG/DL (ref 0.55–1.3)
EOSINOPHIL # BLD AUTO: 0.2 10^3/UL (ref 0–0.5)
EOSINOPHIL NFR BLD AUTO: 2 % (ref 0–3)
GFR SERPL CREATININE-BSD FRML MDRD: > 60 ML/MIN/{1.73_M2} (ref 51–?)
GLUCOSE SERPL-MCNC: 165 MG/DL (ref 70–100)
HCT VFR BLD AUTO: 41.5 % (ref 36–47)
HGB BLD-MCNC: 14.1 G/DL (ref 12–15.5)
LYMPHOCYTES # BLD AUTO: 1.7 10^3/UL (ref 1.5–5)
LYMPHOCYTES NFR BLD AUTO: 20.2 % (ref 24–44)
MCH RBC QN AUTO: 29.3 PG (ref 27–33)
MCHC RBC AUTO-ENTMCNC: 34 G/DL (ref 32–36.5)
MCV RBC AUTO: 86.3 FL (ref 80–96)
MONOCYTES # BLD AUTO: 0.6 10^3/UL (ref 0–0.8)
MONOCYTES NFR BLD AUTO: 6.9 % (ref 0–5)
NEUTROPHILS # BLD AUTO: 5.9 10^3/UL (ref 1.5–8.5)
NEUTROPHILS NFR BLD AUTO: 69.6 % (ref 36–66)
PLATELET # BLD AUTO: 307 10^3/UL (ref 150–450)
POTASSIUM SERPL-SCNC: 4.2 MEQ/L (ref 3.5–5.1)
RBC # BLD AUTO: 4.81 10^6/UL (ref 4–5.4)
SODIUM SERPL-SCNC: 136 MEQ/L (ref 136–145)
WBC # BLD AUTO: 8.5 10^3/UL (ref 4–10)

## 2019-09-13 RX ADMIN — PROBIOTIC PRODUCT - TAB SCH EA: TAB at 15:39

## 2019-09-13 RX ADMIN — SENNOSIDES SCH TAB: 8.6 TABLET, FILM COATED ORAL at 21:09

## 2019-09-13 RX ADMIN — ATORVASTATIN CALCIUM SCH MG: 20 TABLET, FILM COATED ORAL at 08:10

## 2019-09-13 RX ADMIN — IPRATROPIUM BROMIDE AND ALBUTEROL SULFATE SCH ML: .5; 3 SOLUTION RESPIRATORY (INHALATION) at 07:05

## 2019-09-13 RX ADMIN — ACETAMINOPHEN SCH MG: 500 TABLET ORAL at 15:39

## 2019-09-13 RX ADMIN — ACETAMINOPHEN SCH MG: 500 TABLET ORAL at 08:10

## 2019-09-13 RX ADMIN — CIPROFLOXACIN HYDROCHLORIDE SCH MG: 500 TABLET, FILM COATED ORAL at 06:10

## 2019-09-13 RX ADMIN — CIPROFLOXACIN HYDROCHLORIDE SCH MG: 500 TABLET, FILM COATED ORAL at 17:20

## 2019-09-13 RX ADMIN — MENTHOL, METHYL SALICYLATE SCH DOSE: 10; 15 CREAM TOPICAL at 15:39

## 2019-09-13 RX ADMIN — TOLTERODINE SCH MG: 2 CAPSULE, EXTENDED RELEASE ORAL at 21:09

## 2019-09-13 RX ADMIN — Medication SCH: at 08:13

## 2019-09-13 RX ADMIN — MENTHOL, METHYL SALICYLATE SCH DOSE: 10; 15 CREAM TOPICAL at 08:13

## 2019-09-13 RX ADMIN — TOLTERODINE SCH MG: 2 CAPSULE, EXTENDED RELEASE ORAL at 08:10

## 2019-09-13 RX ADMIN — FLUTICASONE PROPIONATE SCH SPRAY: 50 SPRAY, METERED NASAL at 08:12

## 2019-09-13 RX ADMIN — MENTHOL, METHYL SALICYLATE SCH DOSE: 10; 15 CREAM TOPICAL at 21:11

## 2019-09-13 RX ADMIN — DOCUSATE SODIUM SCH MG: 100 CAPSULE, LIQUID FILLED ORAL at 08:10

## 2019-09-13 RX ADMIN — ASPIRIN 81 MG CHEWABLE TABLET SCH MG: 81 TABLET CHEWABLE at 08:09

## 2019-09-13 RX ADMIN — ENOXAPARIN SODIUM SCH MG: 40 INJECTION SUBCUTANEOUS at 08:12

## 2019-09-13 RX ADMIN — NYSTATIN SCH DOSE: 100000 POWDER TOPICAL at 08:13

## 2019-09-13 RX ADMIN — PANTOPRAZOLE SODIUM SCH MG: 40 TABLET, DELAYED RELEASE ORAL at 08:11

## 2019-09-13 RX ADMIN — ACETAMINOPHEN SCH MG: 500 TABLET ORAL at 21:10

## 2019-09-13 RX ADMIN — INSULIN LISPRO SCH UNITS: 100 INJECTION, SOLUTION INTRAVENOUS; SUBCUTANEOUS at 21:00

## 2019-09-13 RX ADMIN — DOCUSATE SODIUM SCH MG: 100 CAPSULE, LIQUID FILLED ORAL at 21:10

## 2019-09-13 RX ADMIN — INSULIN LISPRO SCH UNITS: 100 INJECTION, SOLUTION INTRAVENOUS; SUBCUTANEOUS at 17:20

## 2019-09-13 RX ADMIN — IPRATROPIUM BROMIDE AND ALBUTEROL SULFATE SCH ML: .5; 3 SOLUTION RESPIRATORY (INHALATION) at 19:50

## 2019-09-13 RX ADMIN — LIDOCAINE SCH PATCH: 50 PATCH CUTANEOUS at 21:11

## 2019-09-13 RX ADMIN — LATANOPROST SCH DROP: 50 SOLUTION OPHTHALMIC at 21:11

## 2019-09-13 RX ADMIN — LISINOPRIL SCH MG: 10 TABLET ORAL at 21:09

## 2019-09-13 RX ADMIN — CLOPIDOGREL BISULFATE SCH MG: 75 TABLET ORAL at 08:10

## 2019-09-13 RX ADMIN — PROBIOTIC PRODUCT - TAB SCH EA: TAB at 08:10

## 2019-09-13 RX ADMIN — PROBIOTIC PRODUCT - TAB SCH EA: TAB at 21:09

## 2019-09-13 RX ADMIN — FLUTICASONE PROPIONATE SCH SPRAY: 50 SPRAY, METERED NASAL at 21:10

## 2019-09-13 RX ADMIN — INSULIN LISPRO SCH UNITS: 100 INJECTION, SOLUTION INTRAVENOUS; SUBCUTANEOUS at 08:11

## 2019-09-13 RX ADMIN — BUPROPION HYDROCHLORIDE SCH MG: 150 TABLET, FILM COATED, EXTENDED RELEASE ORAL at 08:10

## 2019-09-13 RX ADMIN — INSULIN LISPRO SCH UNITS: 100 INJECTION, SOLUTION INTRAVENOUS; SUBCUTANEOUS at 12:22

## 2019-09-13 RX ADMIN — NYSTATIN SCH DOSE: 100000 POWDER TOPICAL at 21:11

## 2019-09-13 NOTE — REP
COOKIE SWALLOW

 

The procedure was performed under the direct supervision of Dr. Garcia.

 

The procedure was performed with Elva Webster from speech pathology present.

 

5 ml aliquots of nectar, pudding, fruit, soft solid and honey consistency barium

was administered.  With nectar consistency barium from a cup there was laryngeal

penetration.  When the patient drank nectar consistency barium with a straw and

there was aspiration.

 

The detailed report of this examination will be provided by speech pathology.

 

1.6 minutes of fluoroscopy time was utilized for this procedure.

 

 

Electronically Signed by

ANI Rose 09/13/2019 03:58 P

Electronically Signed by

Andre Garcia MD 09/13/2019 04:28 P

## 2019-09-14 VITALS — SYSTOLIC BLOOD PRESSURE: 117 MMHG | DIASTOLIC BLOOD PRESSURE: 70 MMHG

## 2019-09-14 VITALS — SYSTOLIC BLOOD PRESSURE: 138 MMHG | DIASTOLIC BLOOD PRESSURE: 86 MMHG

## 2019-09-14 VITALS — DIASTOLIC BLOOD PRESSURE: 61 MMHG | SYSTOLIC BLOOD PRESSURE: 113 MMHG

## 2019-09-14 RX ADMIN — ENOXAPARIN SODIUM SCH MG: 40 INJECTION SUBCUTANEOUS at 08:56

## 2019-09-14 RX ADMIN — FLUTICASONE PROPIONATE SCH SPRAY: 50 SPRAY, METERED NASAL at 08:56

## 2019-09-14 RX ADMIN — DOCUSATE SODIUM SCH MG: 100 CAPSULE, LIQUID FILLED ORAL at 20:34

## 2019-09-14 RX ADMIN — PROBIOTIC PRODUCT - TAB SCH EA: TAB at 08:55

## 2019-09-14 RX ADMIN — PROBIOTIC PRODUCT - TAB SCH EA: TAB at 16:09

## 2019-09-14 RX ADMIN — NYSTATIN SCH DOSE: 100000 POWDER TOPICAL at 20:35

## 2019-09-14 RX ADMIN — LISINOPRIL SCH MG: 10 TABLET ORAL at 20:33

## 2019-09-14 RX ADMIN — CLOPIDOGREL BISULFATE SCH MG: 75 TABLET ORAL at 08:54

## 2019-09-14 RX ADMIN — ACETAMINOPHEN SCH MG: 500 TABLET ORAL at 16:09

## 2019-09-14 RX ADMIN — SENNOSIDES SCH TAB: 8.6 TABLET, FILM COATED ORAL at 20:33

## 2019-09-14 RX ADMIN — LATANOPROST SCH DROP: 50 SOLUTION OPHTHALMIC at 20:34

## 2019-09-14 RX ADMIN — ASPIRIN 81 MG CHEWABLE TABLET SCH MG: 81 TABLET CHEWABLE at 08:54

## 2019-09-14 RX ADMIN — Medication SCH: at 08:56

## 2019-09-14 RX ADMIN — TOLTERODINE SCH MG: 2 CAPSULE, EXTENDED RELEASE ORAL at 08:55

## 2019-09-14 RX ADMIN — MENTHOL, METHYL SALICYLATE SCH DOSE: 10; 15 CREAM TOPICAL at 16:09

## 2019-09-14 RX ADMIN — PROBIOTIC PRODUCT - TAB SCH EA: TAB at 20:34

## 2019-09-14 RX ADMIN — IPRATROPIUM BROMIDE AND ALBUTEROL SULFATE SCH ML: .5; 3 SOLUTION RESPIRATORY (INHALATION) at 20:19

## 2019-09-14 RX ADMIN — MENTHOL, METHYL SALICYLATE SCH DOSE: 10; 15 CREAM TOPICAL at 20:35

## 2019-09-14 RX ADMIN — ACETAMINOPHEN SCH MG: 500 TABLET ORAL at 08:55

## 2019-09-14 RX ADMIN — TOLTERODINE SCH MG: 2 CAPSULE, EXTENDED RELEASE ORAL at 20:34

## 2019-09-14 RX ADMIN — INSULIN LISPRO SCH UNITS: 100 INJECTION, SOLUTION INTRAVENOUS; SUBCUTANEOUS at 20:36

## 2019-09-14 RX ADMIN — ACETAMINOPHEN SCH MG: 500 TABLET ORAL at 20:34

## 2019-09-14 RX ADMIN — IPRATROPIUM BROMIDE AND ALBUTEROL SULFATE SCH ML: .5; 3 SOLUTION RESPIRATORY (INHALATION) at 07:33

## 2019-09-14 RX ADMIN — FLUTICASONE PROPIONATE SCH SPRAY: 50 SPRAY, METERED NASAL at 20:35

## 2019-09-14 RX ADMIN — PANTOPRAZOLE SODIUM SCH MG: 40 TABLET, DELAYED RELEASE ORAL at 08:54

## 2019-09-14 RX ADMIN — MENTHOL, METHYL SALICYLATE SCH DOSE: 10; 15 CREAM TOPICAL at 08:56

## 2019-09-14 RX ADMIN — ATORVASTATIN CALCIUM SCH MG: 20 TABLET, FILM COATED ORAL at 08:55

## 2019-09-14 RX ADMIN — INSULIN LISPRO SCH UNITS: 100 INJECTION, SOLUTION INTRAVENOUS; SUBCUTANEOUS at 17:15

## 2019-09-14 RX ADMIN — LIDOCAINE SCH PATCH: 50 PATCH CUTANEOUS at 20:33

## 2019-09-14 RX ADMIN — INSULIN LISPRO SCH UNITS: 100 INJECTION, SOLUTION INTRAVENOUS; SUBCUTANEOUS at 07:42

## 2019-09-14 RX ADMIN — INSULIN LISPRO SCH UNITS: 100 INJECTION, SOLUTION INTRAVENOUS; SUBCUTANEOUS at 12:23

## 2019-09-14 RX ADMIN — BUPROPION HYDROCHLORIDE SCH MG: 150 TABLET, FILM COATED, EXTENDED RELEASE ORAL at 08:55

## 2019-09-14 RX ADMIN — NYSTATIN SCH DOSE: 100000 POWDER TOPICAL at 08:56

## 2019-09-14 RX ADMIN — DOCUSATE SODIUM SCH MG: 100 CAPSULE, LIQUID FILLED ORAL at 08:54

## 2019-09-15 VITALS — DIASTOLIC BLOOD PRESSURE: 62 MMHG | SYSTOLIC BLOOD PRESSURE: 139 MMHG

## 2019-09-15 VITALS — SYSTOLIC BLOOD PRESSURE: 123 MMHG | DIASTOLIC BLOOD PRESSURE: 64 MMHG

## 2019-09-15 VITALS — DIASTOLIC BLOOD PRESSURE: 68 MMHG | SYSTOLIC BLOOD PRESSURE: 138 MMHG

## 2019-09-15 RX ADMIN — IPRATROPIUM BROMIDE AND ALBUTEROL SULFATE SCH ML: .5; 3 SOLUTION RESPIRATORY (INHALATION) at 21:05

## 2019-09-15 RX ADMIN — ACETAMINOPHEN SCH MG: 500 TABLET ORAL at 08:00

## 2019-09-15 RX ADMIN — FLUTICASONE PROPIONATE SCH SPRAY: 50 SPRAY, METERED NASAL at 07:58

## 2019-09-15 RX ADMIN — IPRATROPIUM BROMIDE AND ALBUTEROL SULFATE SCH ML: .5; 3 SOLUTION RESPIRATORY (INHALATION) at 07:34

## 2019-09-15 RX ADMIN — LISINOPRIL SCH MG: 10 TABLET ORAL at 20:29

## 2019-09-15 RX ADMIN — PROBIOTIC PRODUCT - TAB SCH EA: TAB at 08:00

## 2019-09-15 RX ADMIN — PROBIOTIC PRODUCT - TAB SCH EA: TAB at 20:30

## 2019-09-15 RX ADMIN — INSULIN LISPRO SCH UNITS: 100 INJECTION, SOLUTION INTRAVENOUS; SUBCUTANEOUS at 16:51

## 2019-09-15 RX ADMIN — INSULIN LISPRO SCH UNITS: 100 INJECTION, SOLUTION INTRAVENOUS; SUBCUTANEOUS at 12:03

## 2019-09-15 RX ADMIN — CLOPIDOGREL BISULFATE SCH MG: 75 TABLET ORAL at 08:00

## 2019-09-15 RX ADMIN — MENTHOL, METHYL SALICYLATE SCH DOSE: 10; 15 CREAM TOPICAL at 20:30

## 2019-09-15 RX ADMIN — NYSTATIN SCH DOSE: 100000 POWDER TOPICAL at 07:58

## 2019-09-15 RX ADMIN — LATANOPROST SCH DROP: 50 SOLUTION OPHTHALMIC at 20:30

## 2019-09-15 RX ADMIN — PROBIOTIC PRODUCT - TAB SCH EA: TAB at 15:22

## 2019-09-15 RX ADMIN — PANTOPRAZOLE SODIUM SCH MG: 40 TABLET, DELAYED RELEASE ORAL at 08:00

## 2019-09-15 RX ADMIN — ATORVASTATIN CALCIUM SCH MG: 20 TABLET, FILM COATED ORAL at 08:00

## 2019-09-15 RX ADMIN — ACETAMINOPHEN SCH MG: 500 TABLET ORAL at 20:30

## 2019-09-15 RX ADMIN — BUPROPION HYDROCHLORIDE SCH MG: 150 TABLET, FILM COATED, EXTENDED RELEASE ORAL at 08:00

## 2019-09-15 RX ADMIN — ASPIRIN 81 MG CHEWABLE TABLET SCH MG: 81 TABLET CHEWABLE at 08:00

## 2019-09-15 RX ADMIN — INSULIN LISPRO SCH UNITS: 100 INJECTION, SOLUTION INTRAVENOUS; SUBCUTANEOUS at 20:27

## 2019-09-15 RX ADMIN — ENOXAPARIN SODIUM SCH MG: 40 INJECTION SUBCUTANEOUS at 08:01

## 2019-09-15 RX ADMIN — LIDOCAINE SCH PATCH: 50 PATCH CUTANEOUS at 20:29

## 2019-09-15 RX ADMIN — SENNOSIDES SCH TAB: 8.6 TABLET, FILM COATED ORAL at 20:29

## 2019-09-15 RX ADMIN — NYSTATIN SCH DOSE: 100000 POWDER TOPICAL at 21:00

## 2019-09-15 RX ADMIN — MENTHOL, METHYL SALICYLATE SCH DOSE: 10; 15 CREAM TOPICAL at 15:23

## 2019-09-15 RX ADMIN — TOLTERODINE SCH MG: 2 CAPSULE, EXTENDED RELEASE ORAL at 20:29

## 2019-09-15 RX ADMIN — FLUTICASONE PROPIONATE SCH SPRAY: 50 SPRAY, METERED NASAL at 20:30

## 2019-09-15 RX ADMIN — MENTHOL, METHYL SALICYLATE SCH DOSE: 10; 15 CREAM TOPICAL at 07:59

## 2019-09-15 RX ADMIN — DOCUSATE SODIUM SCH MG: 100 CAPSULE, LIQUID FILLED ORAL at 08:00

## 2019-09-15 RX ADMIN — INSULIN LISPRO SCH UNITS: 100 INJECTION, SOLUTION INTRAVENOUS; SUBCUTANEOUS at 07:28

## 2019-09-15 RX ADMIN — Medication SCH: at 08:01

## 2019-09-15 RX ADMIN — TOLTERODINE SCH MG: 2 CAPSULE, EXTENDED RELEASE ORAL at 08:00

## 2019-09-15 RX ADMIN — ACETAMINOPHEN SCH MG: 500 TABLET ORAL at 15:22

## 2019-09-15 RX ADMIN — DOCUSATE SODIUM SCH MG: 100 CAPSULE, LIQUID FILLED ORAL at 20:29

## 2019-09-15 NOTE — IPNPDOC
Text Note


Date of Service


The patient was seen on 9/15/19.





NOTE


Subjective:


Patient is a 54-year-old  female with who transferred to ARU from [facility] for left-sided hemiparesis after she had a CVA. 





Patient was seen and examined at the bedside. . She denies any chest pain, 

shortness of breath or palpitations. Denies any urinary discomfort. Denies any 

diarrhea. Has continued to work with physical therapy.





Objective:


Vitals (See below)


General: Lying in bed, no acute distress, comfortable, AAOx3


HEENT: NC, AT


CVS: RRR


Lungs: Air entry is fair bilaterally, without any evidence of rhonchi, rales or 

wheezing


Abdomen: Remains soft. No evidence of distention or tenderness. Morbid obesity


Extremities: Trace edema bilaterally, - Calf tenderness


Neuro: Left-sided weakness of upper and lower extremities still persists





Assessment and plan:


s/p Acute CVA at bilateral basal ganglia


- c/w ASA, Plavix and Atorvastatin


- Physical therapy at the direction of ARU - anticipate that she usually will be

discharged this week





Dysphagia / Slurred speech - 2/2 above


- c/w level 2 diet, nectar-thick liquids





HTN


- Blood pressure remains well-controlled


- c/w HCTZ and Lisinopril





DM2


- c/w ISS


 


GIULIANO on CPAP


- c/w CPAP





Mood disorder


- c/w Fluoxetine and Bupropion 





UTI


- Clinically has had improvement of her urinary tract symptoms


- Urinalysis is consistent with urinary tract infection


- Urine cultures were positive for Escherichia coli and staph warneri


- s/p Ciprofloxacin 





GI prophylaxis


- c/w Protonix 





DVT prophylaxis


- c/w Lovenox





Disposition:


- Awaiting for improvement with physical therapy





VS,Hossein, I+O


VS, Hossein, I+O





Vital Signs








  Date Time  Temp Pulse Resp B/P (MAP) Pulse Ox O2 Delivery O2 Flow Rate FiO2


 


9/15/19 10:00   18     


 


9/15/19 04:00 97.0 60  138/68 (91) 91   














I&O- Last 24 Hours up to 6 AM 


 


 9/15/19





 05:59


 


Intake Total 860 ml


 


Balance 860 ml

















KAMAR DIXON MD                Sep 15, 2019 13:05

## 2019-09-16 VITALS — DIASTOLIC BLOOD PRESSURE: 63 MMHG | SYSTOLIC BLOOD PRESSURE: 115 MMHG

## 2019-09-16 VITALS — SYSTOLIC BLOOD PRESSURE: 115 MMHG | DIASTOLIC BLOOD PRESSURE: 72 MMHG

## 2019-09-16 VITALS — DIASTOLIC BLOOD PRESSURE: 92 MMHG | SYSTOLIC BLOOD PRESSURE: 133 MMHG

## 2019-09-16 RX ADMIN — NYSTATIN SCH DOSE: 100000 POWDER TOPICAL at 08:52

## 2019-09-16 RX ADMIN — ACETAMINOPHEN SCH MG: 500 TABLET ORAL at 08:44

## 2019-09-16 RX ADMIN — MENTHOL, METHYL SALICYLATE SCH DOSE: 10; 15 CREAM TOPICAL at 15:57

## 2019-09-16 RX ADMIN — DOCUSATE SODIUM SCH MG: 100 CAPSULE, LIQUID FILLED ORAL at 08:43

## 2019-09-16 RX ADMIN — ACETAMINOPHEN SCH MG: 500 TABLET ORAL at 15:58

## 2019-09-16 RX ADMIN — ACETAMINOPHEN SCH MG: 500 TABLET ORAL at 20:21

## 2019-09-16 RX ADMIN — ATORVASTATIN CALCIUM SCH MG: 20 TABLET, FILM COATED ORAL at 08:44

## 2019-09-16 RX ADMIN — PROBIOTIC PRODUCT - TAB SCH EA: TAB at 15:58

## 2019-09-16 RX ADMIN — CLOPIDOGREL BISULFATE SCH MG: 75 TABLET ORAL at 08:43

## 2019-09-16 RX ADMIN — MENTHOL, METHYL SALICYLATE SCH DOSE: 10; 15 CREAM TOPICAL at 20:23

## 2019-09-16 RX ADMIN — PROBIOTIC PRODUCT - TAB SCH EA: TAB at 20:21

## 2019-09-16 RX ADMIN — LATANOPROST SCH DROP: 50 SOLUTION OPHTHALMIC at 20:22

## 2019-09-16 RX ADMIN — LISINOPRIL SCH MG: 10 TABLET ORAL at 20:22

## 2019-09-16 RX ADMIN — IPRATROPIUM BROMIDE AND ALBUTEROL SULFATE SCH ML: .5; 3 SOLUTION RESPIRATORY (INHALATION) at 20:09

## 2019-09-16 RX ADMIN — NYSTATIN SCH DOSE: 100000 POWDER TOPICAL at 20:23

## 2019-09-16 RX ADMIN — ENOXAPARIN SODIUM SCH MG: 40 INJECTION SUBCUTANEOUS at 08:43

## 2019-09-16 RX ADMIN — FLUTICASONE PROPIONATE SCH SPRAY: 50 SPRAY, METERED NASAL at 20:22

## 2019-09-16 RX ADMIN — INSULIN LISPRO SCH UNITS: 100 INJECTION, SOLUTION INTRAVENOUS; SUBCUTANEOUS at 17:24

## 2019-09-16 RX ADMIN — MENTHOL, METHYL SALICYLATE SCH DOSE: 10; 15 CREAM TOPICAL at 08:51

## 2019-09-16 RX ADMIN — PANTOPRAZOLE SODIUM SCH MG: 40 TABLET, DELAYED RELEASE ORAL at 08:43

## 2019-09-16 RX ADMIN — BUPROPION HYDROCHLORIDE SCH MG: 150 TABLET, FILM COATED, EXTENDED RELEASE ORAL at 08:43

## 2019-09-16 RX ADMIN — Medication SCH: at 08:52

## 2019-09-16 RX ADMIN — IPRATROPIUM BROMIDE AND ALBUTEROL SULFATE SCH ML: .5; 3 SOLUTION RESPIRATORY (INHALATION) at 07:19

## 2019-09-16 RX ADMIN — TOLTERODINE SCH MG: 2 CAPSULE, EXTENDED RELEASE ORAL at 20:21

## 2019-09-16 RX ADMIN — INSULIN LISPRO SCH UNITS: 100 INJECTION, SOLUTION INTRAVENOUS; SUBCUTANEOUS at 20:24

## 2019-09-16 RX ADMIN — INSULIN LISPRO SCH UNITS: 100 INJECTION, SOLUTION INTRAVENOUS; SUBCUTANEOUS at 08:44

## 2019-09-16 RX ADMIN — PROBIOTIC PRODUCT - TAB SCH EA: TAB at 08:44

## 2019-09-16 RX ADMIN — LIDOCAINE SCH PATCH: 50 PATCH CUTANEOUS at 20:22

## 2019-09-16 RX ADMIN — DOCUSATE SODIUM SCH MG: 100 CAPSULE, LIQUID FILLED ORAL at 20:21

## 2019-09-16 RX ADMIN — FLUTICASONE PROPIONATE SCH SPRAY: 50 SPRAY, METERED NASAL at 08:50

## 2019-09-16 RX ADMIN — SENNOSIDES SCH TAB: 8.6 TABLET, FILM COATED ORAL at 20:22

## 2019-09-16 RX ADMIN — TOLTERODINE SCH MG: 2 CAPSULE, EXTENDED RELEASE ORAL at 08:43

## 2019-09-16 RX ADMIN — ASPIRIN 81 MG CHEWABLE TABLET SCH MG: 81 TABLET CHEWABLE at 08:43

## 2019-09-16 RX ADMIN — INSULIN LISPRO SCH UNITS: 100 INJECTION, SOLUTION INTRAVENOUS; SUBCUTANEOUS at 12:30

## 2019-09-16 NOTE — IPNPDOC
PM&R Progress Note


DATE OF SERVICE:  Sep 16, 2019


Physiatrist Progress Note


Subjective:


Patient seen in her room stating she feels ready to go home tomorrow and does 

not want to stay longer. 





REVIEW OF SYSTEMS: The following is a completed review of systems and has been 

reviewed. Review of systems otherwise unremarkable.


PAIN: Patient self reports no pain


EYES: No recent vision changes


EARS, NOSE, & THROAT: +dysphagia


CARDIOVASCULAR: Denies chest pain or palpitations


PULMONARY: Denies shortness of breath, except with exertion


GASTROINTESTINAL: Denies constipation/diarrhea


GENITOURINARY: denies dysuria


MUSCULOSKELETAL: LUE weakness and LLE weakness


NEUROLOGICAL: left sided paresis, facial droop


SKIN: no rash


PSYCHIATRIC: +schizophrenia


All other review of systems found to be negative.








PHYSICAL EXAMINATION:


VITAL SIGNS: Please see below.


GENERAL: Pleasant and cooperative. No acute distress. obese


HEENT: PERRL. Extraocular movements intact. Clear conjunctiva, left sided facial

droop


CARDIOVASCULAR: Regular rate and rhythm. No murmurs, rubs, or gallops


LUNGS: decreased breath sounds, no wheezes appreciated


ABDOMEN: Soft, nontender, nondistended. Positive bowel sounds. Normal active 

bowel sounds


NEUROLOGICAL: Alert and oriented times three. Cranial nerves II through XII 

grossly intact except left facial droop. Sensation grossly intact  to light 

touch all 4 extremities


equivocal babinksi bilat, no clonus


EXTREMITIES: 5\5 strength right upper extremities. flaccid LUE, 5\5 strength 

right lower extremity. 4/5 strength in left lower extremity. 





SKIN:keratotic, RLQ of her abdomen with ecchymosis, no induration, non-TTP








ASSESSMENT:54-year-old F with past medical history of obesity, HTN, who presents

status post stroke.





PLAN:


1. Rehab: PT- strengthen/stretch/maintain ROM bilat LE, improve gait and 

endurance, family training- trunk control improving and able to take steps in 

parallel bars


OT-strengthen/stretch/maintain ROM bilat UE, improve trunk control and ADL ma

nagement- trunk control improving


-encouraged to work on mat exercises to engage core better to limit back spasms


SLP- significant dysphagia, MBS today, upgrded to level two, continue nectar


2. Neuro: recent CVA with new onset stroke in the bilateral basal ganglia and 

corona radiata- c/u ASA and Plavix, c/u statin, f/u with [facility]


-prozac  qhs for motor recovery


3. Cardio: pmh HTN, c/u lisinopril and HCTZ- medicine consulted to assist in 

management


4. Resp: heavy smoker, c/u Duonebs, GIULIANO c/u CPAP, monitor pulse-ox and for 

infection


-CXR 8/23/19, no infiltrate


5. Psych- pmh schizophrenia c/u Wellbutryn


6. DVT ppx: lovenox and TEDs, Dopplers negative


7. GI ppx- protonix


8. : monitor PVRs, patient with increased urination, initial Ucx contaminated,

 s/p one time dose Fosfomycin 9/5/19, Detrol started, repeat UA + E. coli and 

Staph Warneri, s/p course of Ciprofloxacin 


9. Pain: chronic right sided low back pain, c/u lidoderm patch to low back qHS, 

c/u standing tylenol, c/u oxycodone 8am, 10 am and 1pm and q12h prn 


10. Skin: abdominal ecchymosis likely due transfers, will monitor- Hgb stable


9. Dispo: 9/17/19 to home, progressing slowly towards goals- patient's family is

able to provide 24-7 care, family has been trained in therapy for transfers, the

y have built a ramp and are putting up grab bars for d/c to home tomorrow. 

Patient and her sister are aware that this plan is not optimal or advised by ARU

staff as patient is still inconsistent in her mobility and that there is a high 

risk of fall at home. Patient and her sister understand this risk and wish to 

proceed with a discharge to home tomorrow. Patient was repeatedly offered extra 

time on ARU to enhance her mobility, but has declined on multiple occasions. 

Therapy has trained family and have attempted to create the safest discharge 

given these circumstances.


Allergies


Coded Allergies:  


     No Known Allergies (Verified , 8/26/04)





Vital Signs





Vital Signs








  Date Time  Temp Pulse Resp B/P (MAP) Pulse Ox O2 Delivery O2 Flow Rate FiO2


 


9/16/19 14:00 97.0 88 18 115/63 (80) 94   











Laboratory Data


Labs 24H


Laboratory Tests 2


9/15/19 16:32: Bedside Glucose (Misc Panel) 136H


9/15/19 19:32: Bedside Glucose (Misc Panel) 130H


9/16/19 06:37: Bedside Glucose (Misc Panel) 167H


9/16/19 11:48: Bedside Glucose (Misc Panel) 123H





Microbiology





Microbiology


9/6/19 Urine Culture - Final, Complete


         Escherichia Coli


         Staphylococcus Warneri





Current Medications


Current Medications





Current Medications








 Medications


  (Trade)  Dose


 Ordered  Sig/Rose


 Route


 PRN Reason  Start Time


 Stop Time Status Last Admin


Dose Admin


 


 Acetaminophen


  (Tylenol Tab)  650 mg  Q4HP  PRN


 PO


 fever/MILD PAIN (PS 1-4)  8/22/19 20:15


 8/29/19 11:26 DC 8/28/19 08:30





 


 Acetaminophen


  (Tylenol Tab)  1,000 mg  TID


 PO


   8/29/19 16:00


    9/16/19 08:44





 


 Albuterol/


 Ipratropium


  (Duoneb (Ipr


 0.5mg/Alb 2.5mg))  3 ml  RBID


 NEB


   8/23/19 08:00


    9/16/19 07:19





 


 Albuterol/


 Ipratropium


  (Duoneb (Ipr


 0.5mg/Alb 2.5mg))  3 ml  RQ8H  PRN


 NEB


 WHEEZING  8/22/19 20:15


     





 


 Aspirin


  (Aspirin


 Chewable)  81 mg  DAILY


 PO


   8/23/19 09:00


    9/16/19 08:43





 


 Atorvastatin


 Calcium


  (Lipitor)  80 mg  DAILY


 PO


   8/23/19 09:00


    9/16/19 08:44





 


 Bisacodyl


  (Dulcolax


 Suppository)  10 mg  DAILYPRN  PRN


 LA


 CONSTIPATION  8/22/19 20:15


     





 


 Bupropion HCl


  (Wellbutrin Xl)  150 mg  QAM


 PO


   8/23/19 09:00


    9/16/19 08:43





 


 Ciprofloxacin


  (Cipro)  500 mg  BID@06,18


 PO


   9/7/19 11:00


 9/13/19 23:00 DC 9/13/19 17:20





 


 Clopidogrel


 Bisulfate


  (PLAVix)  75 mg  DAILY


 PO


   8/23/19 09:00


    9/16/19 08:43





 


 Dextrose


  (Dextrose 50%)  25 ml  ASDIRECTED  PRN


 IV


 SEE LABEL COMMENTS  8/22/19 20:15


     





 


 Docusate Sodium


  (Colace)  100 mg  BID


 PO


   8/22/19 21:00


    9/16/19 08:43





 


 Enoxaparin Sodium


  (Lovenox)  40 mg  DAILY


 SC


   8/23/19 09:00


    9/16/19 08:43





 


 Fluoxetine HCl


  (PROzac)  20 mg  QHS


 PO


   8/23/19 21:00


    9/15/19 20:29





 


 Fluticasone


 Propionate


  (Flonase 0.05%


 Nasal Spray)  1 spray  BID


 NARES


   8/22/19 21:00


    9/16/19 08:50





 


 Glucagon


  (Glucagon)  1 mg  ASDIRECTED  PRN


 SC


 SEE LABEL COMMENTS  8/22/19 20:15


     





 


 Glucose


  (Glucose)  16 GM  ASDIRECTED  PRN


 PO


 SEE LABEL COMMENTS  8/22/19 20:15


     





 


 Guaifenesin


  (Robitussin Tab)  400 mg  TID


 PO


   8/23/19 16:00


    9/16/19 08:44





 


 Home Med


  (Med Rec


 Complete!)    ASDIRECTED


 XX


   8/22/19 21:15


 8/22/19 21:15 DC  





 


 Hydrochlorothiazide


  (Hydrodiuril)  25 mg  DAILY


 PO


   8/23/19 09:00


    9/16/19 08:43





 


 Insulin Human


 Lispro


  (HumaLOG INSULIN)  SEE


 PROTOCOL


 TABLE  AC


 SC


   8/23/19 07:30


    9/16/19 12:30





 


 Insulin Human


 Lispro


  (HumaLOG INSULIN)  SEE


 PROTOCOL


 TABLE  QHS


 SC


   8/22/19 21:00


    9/12/19 20:35





 


 Lactobacillus


 Acidophilus


  (Bacid)  1 ea  TID


 PO


   9/5/19 09:00


    9/16/19 08:44





 


 Latanoprost


  (Xalatan 0.005%


 Op Soln)  1 drop  QHS


 OU


   8/22/19 21:00


    9/15/19 20:30





 


 Lidocaine


  (Lidoderm Patch)  2 patch  QHS


 TD


   8/28/19 21:00


    9/15/19 20:29





 


 Lisinopril


  (Prinivil)  10 mg  QHS


 PO


   8/23/19 21:00


    9/15/19 20:29





 


 Magnesium


 Hydroxide


  (Milk Of


 Magnesia)  30 ml  DAILYPRN  PRN


 PO


 CONSTIPATION  8/22/19 20:15


     





 


 Menthol/Methyl


 Salicylate


  (Bengay Cream)  1 dose  TID


 TOP


   8/30/19 09:00


    9/16/19 08:51





 


 Miscellaneous


  (Unresolved


 Clarification


 Entry)  SEE LABEL


 COMMENTS  DAILY


 XX


   9/11/19 09:00


 9/11/19 11:28 DC  





 


 Miscellaneous


  (Unresolved


 Clarification


 Entry)  SEE LABEL


 COMMENTS  DAILY


 XX


   9/15/19 09:00


 9/15/19 16:08 DC  





 


 Non-Formulary


 Medication


  (** See Comment


 Field Below **)  REMOVE


 LIDODERM


 PATCH  DAILY@0900


 XX


   8/29/19 09:00


    9/16/19 08:52





 


 Nystatin


  (Mycostatin


 Powder, Nystop)    BID


 TOP


   8/25/19 21:00


    9/16/19 08:52





 


 Oxycodone HCl


  (Roxicodone,


 Oxyir)  5 mg  BID@1000,1300


 PO


   9/4/19 10:00


 9/9/19 14:24 DC 9/9/19 12:26





 


 Oxycodone HCl


  (Roxicodone,


 Oxyir)  5 mg  Q4HP  PRN


 PO


 PAIN  8/30/19 15:00


    9/5/19 17:00





 


 Oxycodone HCl


  (Roxicodone,


 Oxyir)  5 mg  TID@0700,1000,1300


 PO


   9/10/19 07:00


    9/16/19 12:29





 


 Pantoprazole


 Sodium


  (Protonix)  40 mg  DAILY


 PO


   8/23/19 09:00


    9/16/19 08:43





 


 Senna


  (Senokot)  1 tab  QHS


 PO


   8/22/19 21:00


    9/15/19 20:29





 


 Tolterodine


 Tartrate


  (Detrol La)  2 mg  BID


 PO


   9/6/19 09:00


    9/16/19 08:43





 


 Tolterodine


 Tartrate


  (Detrol La)  2 mg  DAILY@1500,2300


 PO


   9/5/19 15:00


 9/5/19 23:01 DC 9/5/19 22:31




















JASWANT ALFARO MD         Sep 16, 2019 15:33

## 2019-09-17 VITALS — SYSTOLIC BLOOD PRESSURE: 133 MMHG | DIASTOLIC BLOOD PRESSURE: 96 MMHG

## 2019-09-17 VITALS — DIASTOLIC BLOOD PRESSURE: 61 MMHG | SYSTOLIC BLOOD PRESSURE: 135 MMHG

## 2019-09-17 VITALS — SYSTOLIC BLOOD PRESSURE: 130 MMHG | DIASTOLIC BLOOD PRESSURE: 72 MMHG

## 2019-09-17 RX ADMIN — LATANOPROST SCH DROP: 50 SOLUTION OPHTHALMIC at 21:03

## 2019-09-17 RX ADMIN — INSULIN LISPRO SCH UNITS: 100 INJECTION, SOLUTION INTRAVENOUS; SUBCUTANEOUS at 17:20

## 2019-09-17 RX ADMIN — IPRATROPIUM BROMIDE AND ALBUTEROL SULFATE SCH ML: .5; 3 SOLUTION RESPIRATORY (INHALATION) at 07:21

## 2019-09-17 RX ADMIN — FLUTICASONE PROPIONATE SCH SPRAY: 50 SPRAY, METERED NASAL at 21:02

## 2019-09-17 RX ADMIN — ACETAMINOPHEN SCH MG: 500 TABLET ORAL at 07:44

## 2019-09-17 RX ADMIN — PANTOPRAZOLE SODIUM SCH MG: 40 TABLET, DELAYED RELEASE ORAL at 07:46

## 2019-09-17 RX ADMIN — SENNOSIDES SCH TAB: 8.6 TABLET, FILM COATED ORAL at 21:01

## 2019-09-17 RX ADMIN — LISINOPRIL SCH MG: 10 TABLET ORAL at 21:02

## 2019-09-17 RX ADMIN — Medication SCH: at 07:55

## 2019-09-17 RX ADMIN — CLOPIDOGREL BISULFATE SCH MG: 75 TABLET ORAL at 07:45

## 2019-09-17 RX ADMIN — ACETAMINOPHEN SCH MG: 500 TABLET ORAL at 21:02

## 2019-09-17 RX ADMIN — MENTHOL, METHYL SALICYLATE SCH DOSE: 10; 15 CREAM TOPICAL at 21:00

## 2019-09-17 RX ADMIN — INSULIN LISPRO SCH UNITS: 100 INJECTION, SOLUTION INTRAVENOUS; SUBCUTANEOUS at 11:35

## 2019-09-17 RX ADMIN — NYSTATIN SCH DOSE: 100000 POWDER TOPICAL at 07:46

## 2019-09-17 RX ADMIN — INSULIN LISPRO SCH UNITS: 100 INJECTION, SOLUTION INTRAVENOUS; SUBCUTANEOUS at 07:46

## 2019-09-17 RX ADMIN — PROBIOTIC PRODUCT - TAB SCH EA: TAB at 07:45

## 2019-09-17 RX ADMIN — ACETAMINOPHEN SCH MG: 500 TABLET ORAL at 17:20

## 2019-09-17 RX ADMIN — NYSTATIN SCH DOSE: 100000 POWDER TOPICAL at 21:03

## 2019-09-17 RX ADMIN — PROBIOTIC PRODUCT - TAB SCH EA: TAB at 21:02

## 2019-09-17 RX ADMIN — INSULIN LISPRO SCH UNITS: 100 INJECTION, SOLUTION INTRAVENOUS; SUBCUTANEOUS at 21:00

## 2019-09-17 RX ADMIN — FLUTICASONE PROPIONATE SCH SPRAY: 50 SPRAY, METERED NASAL at 07:47

## 2019-09-17 RX ADMIN — MENTHOL, METHYL SALICYLATE SCH DOSE: 10; 15 CREAM TOPICAL at 17:20

## 2019-09-17 RX ADMIN — TOLTERODINE SCH MG: 2 CAPSULE, EXTENDED RELEASE ORAL at 21:02

## 2019-09-17 RX ADMIN — MENTHOL, METHYL SALICYLATE SCH DOSE: 10; 15 CREAM TOPICAL at 07:47

## 2019-09-17 RX ADMIN — TOLTERODINE SCH MG: 2 CAPSULE, EXTENDED RELEASE ORAL at 07:44

## 2019-09-17 RX ADMIN — BUPROPION HYDROCHLORIDE SCH MG: 150 TABLET, FILM COATED, EXTENDED RELEASE ORAL at 07:44

## 2019-09-17 RX ADMIN — ENOXAPARIN SODIUM SCH MG: 40 INJECTION SUBCUTANEOUS at 07:43

## 2019-09-17 RX ADMIN — ATORVASTATIN CALCIUM SCH MG: 20 TABLET, FILM COATED ORAL at 07:43

## 2019-09-17 RX ADMIN — PROBIOTIC PRODUCT - TAB SCH EA: TAB at 17:19

## 2019-09-17 RX ADMIN — IPRATROPIUM BROMIDE AND ALBUTEROL SULFATE SCH ML: .5; 3 SOLUTION RESPIRATORY (INHALATION) at 20:32

## 2019-09-17 RX ADMIN — LIDOCAINE SCH PATCH: 50 PATCH CUTANEOUS at 21:04

## 2019-09-17 RX ADMIN — DOCUSATE SODIUM SCH MG: 100 CAPSULE, LIQUID FILLED ORAL at 07:44

## 2019-09-17 RX ADMIN — DOCUSATE SODIUM SCH MG: 100 CAPSULE, LIQUID FILLED ORAL at 21:02

## 2019-09-17 RX ADMIN — ASPIRIN 81 MG CHEWABLE TABLET SCH MG: 81 TABLET CHEWABLE at 07:45

## 2019-09-18 VITALS — SYSTOLIC BLOOD PRESSURE: 110 MMHG | DIASTOLIC BLOOD PRESSURE: 56 MMHG

## 2019-09-18 VITALS — DIASTOLIC BLOOD PRESSURE: 67 MMHG | SYSTOLIC BLOOD PRESSURE: 121 MMHG

## 2019-09-18 VITALS — SYSTOLIC BLOOD PRESSURE: 135 MMHG | DIASTOLIC BLOOD PRESSURE: 73 MMHG

## 2019-09-18 VITALS — SYSTOLIC BLOOD PRESSURE: 137 MMHG | DIASTOLIC BLOOD PRESSURE: 69 MMHG

## 2019-09-18 RX ADMIN — PROBIOTIC PRODUCT - TAB SCH EA: TAB at 17:00

## 2019-09-18 RX ADMIN — IPRATROPIUM BROMIDE AND ALBUTEROL SULFATE SCH ML: .5; 3 SOLUTION RESPIRATORY (INHALATION) at 13:53

## 2019-09-18 RX ADMIN — MENTHOL, METHYL SALICYLATE SCH DOSE: 10; 15 CREAM TOPICAL at 16:00

## 2019-09-18 RX ADMIN — INSULIN LISPRO SCH UNITS: 100 INJECTION, SOLUTION INTRAVENOUS; SUBCUTANEOUS at 17:00

## 2019-09-18 RX ADMIN — BUPROPION HYDROCHLORIDE SCH MG: 150 TABLET, FILM COATED, EXTENDED RELEASE ORAL at 12:01

## 2019-09-18 RX ADMIN — DOCUSATE SODIUM SCH MG: 100 CAPSULE, LIQUID FILLED ORAL at 09:10

## 2019-09-18 RX ADMIN — CLOPIDOGREL BISULFATE SCH MG: 75 TABLET ORAL at 09:10

## 2019-09-18 RX ADMIN — PANTOPRAZOLE SODIUM SCH MG: 40 TABLET, DELAYED RELEASE ORAL at 09:10

## 2019-09-18 RX ADMIN — FLUTICASONE PROPIONATE SCH SPRAY: 50 SPRAY, METERED NASAL at 21:23

## 2019-09-18 RX ADMIN — PROBIOTIC PRODUCT - TAB SCH EA: TAB at 09:11

## 2019-09-18 RX ADMIN — INSULIN LISPRO SCH UNITS: 100 INJECTION, SOLUTION INTRAVENOUS; SUBCUTANEOUS at 11:49

## 2019-09-18 RX ADMIN — LATANOPROST SCH DROP: 50 SOLUTION OPHTHALMIC at 21:23

## 2019-09-18 RX ADMIN — LISINOPRIL SCH MG: 10 TABLET ORAL at 21:22

## 2019-09-18 RX ADMIN — TOLTERODINE SCH MG: 2 CAPSULE, EXTENDED RELEASE ORAL at 21:21

## 2019-09-18 RX ADMIN — IPRATROPIUM BROMIDE AND ALBUTEROL SULFATE SCH ML: .5; 3 SOLUTION RESPIRATORY (INHALATION) at 19:35

## 2019-09-18 RX ADMIN — MENTHOL, METHYL SALICYLATE SCH DOSE: 10; 15 CREAM TOPICAL at 21:00

## 2019-09-18 RX ADMIN — ATORVASTATIN CALCIUM SCH MG: 20 TABLET, FILM COATED ORAL at 09:11

## 2019-09-18 RX ADMIN — ACETAMINOPHEN SCH MG: 500 TABLET ORAL at 17:00

## 2019-09-18 RX ADMIN — NYSTATIN SCH DOSE: 100000 POWDER TOPICAL at 21:25

## 2019-09-18 RX ADMIN — TOLTERODINE SCH MG: 2 CAPSULE, EXTENDED RELEASE ORAL at 09:10

## 2019-09-18 RX ADMIN — INSULIN LISPRO SCH UNITS: 100 INJECTION, SOLUTION INTRAVENOUS; SUBCUTANEOUS at 09:11

## 2019-09-18 RX ADMIN — ACETAMINOPHEN SCH MG: 500 TABLET ORAL at 21:21

## 2019-09-18 RX ADMIN — ENOXAPARIN SODIUM SCH MG: 40 INJECTION SUBCUTANEOUS at 09:11

## 2019-09-18 RX ADMIN — MENTHOL, METHYL SALICYLATE SCH DOSE: 10; 15 CREAM TOPICAL at 09:00

## 2019-09-18 RX ADMIN — ACETAMINOPHEN SCH MG: 500 TABLET ORAL at 09:11

## 2019-09-18 RX ADMIN — Medication SCH: at 09:13

## 2019-09-18 RX ADMIN — LIDOCAINE SCH PATCH: 50 PATCH CUTANEOUS at 21:22

## 2019-09-18 RX ADMIN — FLUTICASONE PROPIONATE SCH SPRAY: 50 SPRAY, METERED NASAL at 09:00

## 2019-09-18 RX ADMIN — DOCUSATE SODIUM SCH MG: 100 CAPSULE, LIQUID FILLED ORAL at 21:21

## 2019-09-18 RX ADMIN — ASPIRIN 81 MG CHEWABLE TABLET SCH MG: 81 TABLET CHEWABLE at 09:11

## 2019-09-18 RX ADMIN — INSULIN LISPRO SCH UNITS: 100 INJECTION, SOLUTION INTRAVENOUS; SUBCUTANEOUS at 21:00

## 2019-09-18 RX ADMIN — NYSTATIN SCH DOSE: 100000 POWDER TOPICAL at 09:14

## 2019-09-18 RX ADMIN — PROBIOTIC PRODUCT - TAB SCH EA: TAB at 21:21

## 2019-09-18 RX ADMIN — SENNOSIDES SCH TAB: 8.6 TABLET, FILM COATED ORAL at 21:22

## 2019-09-18 NOTE — IPNPDOC
PM&R Progress Note


DATE OF SERVICE:  Sep 18, 2019


Physiatrist Progress Note


Subjective:


Patient seen in the gym taking a few steps with a riki-walker, in good spirits. 





REVIEW OF SYSTEMS: The following is a completed review of systems and has been 

reviewed. Review of systems otherwise unremarkable.


PAIN: Patient self reports no pain


EYES: No recent vision changes


EARS, NOSE, & THROAT: +dysphagia


CARDIOVASCULAR: Denies chest pain or palpitations


PULMONARY: Denies shortness of breath, except with exertion


GASTROINTESTINAL: Denies constipation/diarrhea


GENITOURINARY: denies dysuria


MUSCULOSKELETAL: LUE weakness and LLE weakness


NEUROLOGICAL: left sided paresis, facial droop


SKIN: no rash


PSYCHIATRIC: +schizophrenia


All other review of systems found to be negative.








PHYSICAL EXAMINATION:


VITAL SIGNS: Please see below.


GENERAL: Pleasant and cooperative. No acute distress. obese


HEENT: PERRL. Extraocular movements intact. Clear conjunctiva, left sided facial

droop


CARDIOVASCULAR: Regular rate and rhythm. No murmurs, rubs, or gallops


LUNGS: decreased breath sounds, no wheezes appreciated


ABDOMEN: Soft, nontender, nondistended. Positive bowel sounds. Normal active 

bowel sounds


NEUROLOGICAL: Alert and oriented times three. Cranial nerves II through XII 

grossly intact except left facial droop. Sensation grossly intact  to light 

touch all 4 extremities


equivocal babinksi bilat, no clonus


EXTREMITIES: 5\5 strength right upper extremities. flaccid LUE, 5\5 strength r

ight lower extremity. 4/5 strength in left lower extremity. 





SKIN:keratotic, RLQ of her abdomen with ecchymosis, no induration, non-TTP








ASSESSMENT:54-year-old F with past medical history of obesity, HTN, who presents

status post stroke.





PLAN:


1. Rehab: PT- strengthen/stretch/maintain ROM bilat LE, improve gait and 

endurance, family training- trunk control improving and able to take steps in 

parallel bars


OT-strengthen/stretch/maintain ROM bilat UE, improve trunk control and ADL 

management- trunk control improving


-encouraged to work on mat exercises to engage core better to limit back spasms


SLP- significant dysphagia, MBS today, upgraded to level two, continue nectar


2. Neuro: recent CVA with new onset stroke in the bilateral basal ganglia and 

corona radiata- c/u ASA and Plavix, c/u statin, f/u with [facility] stroke clinic


-[facility] for motor recovery


3. Cardio: pmh HTN, c/u lisinopril and HCTZ- medicine consulted to assist in 

management


4. Resp: heavy smoker, c/u Duonebs, GIULIANO c/u CPAP, monitor pulse-ox and for 

infection


-CXR 8/23/19, no infiltrate


5. Psych- pmh schizophrenia c/u Wellbutryn


6. DVT ppx: lovenox and TEDs, Dopplers negative


7. GI ppx- protonix


8. : monitor PVRs, patient with increased urination, initial Ucx contaminated,

 s/p one time dose Fosfomycin 9/5/19, Detrol started, repeat UA + E. coli and 

Staph Warneri, s/p course of Ciprofloxacin 


9. Pain: chronic right sided low back pain, c/u lidoderm patch to low back qHS, 

c/u standing tylenol, c/u oxycodone 8am, 10 am and 1pm and q12h prn 


10. Skin: abdominal ecchymosis likely due transfers, will monitor- Hgb stable


9. Dispo: to home pending arrival of equipment, progressing slowly towards 

goals- patient's family is able to provide 24-7 care, family has been trained in

therapy for transfers, they have built a ramp and are putting up grab bars for 

d/c to home tomorrow. Patient and her sister are aware that this plan is not 

optimal or advised by ARU staff as patient is still inconsistent in her mobility

and that there is a high risk of fall at home. Patient and her sister understand

this risk and wish to proceed with a discharge to home tomorrow. Patient was 

repeatedly offered extra time on ARU to enhance her mobility, but has declined 

on multiple occasions. Therapy has trained family and have attempted to create 

the safest discharge given these circumstances.


Allergies


Coded Allergies:  


     No Known Allergies (Verified , 8/26/04)





Vital Signs





Vital Signs








  Date Time  Temp Pulse Resp B/P (MAP) Pulse Ox O2 Delivery O2 Flow Rate FiO2


 


9/18/19 14:00 97.4 87 20 121/67 (85) 93   











Laboratory Data


Labs 24H


Laboratory Tests 2


9/17/19 16:43: Bedside Glucose (Misc Panel) 113H


9/17/19 20:11: Bedside Glucose (Misc Panel) 158H


9/18/19 06:17: Bedside Glucose (Misc Panel) 163H


9/18/19 11:40: Bedside Glucose (Misc Panel) 89





Current Medications


Current Medications





Current Medications








 Medications


  (Trade)  Dose


 Ordered  Sig/Rose


 Route


 PRN Reason  Start Time


 Stop Time Status Last Admin


Dose Admin


 


 Acetaminophen


  (Tylenol Tab)  650 mg  Q4HP  PRN


 PO


 fever/MILD PAIN (PS 1-4)  8/22/19 20:15


 8/29/19 11:26 DC 8/28/19 08:30





 


 Acetaminophen


  (Tylenol Tab)  1,000 mg  TID


 PO


   8/29/19 16:00


    9/18/19 09:11





 


 Albuterol/


 Ipratropium


  (Duoneb (Ipr


 0.5mg/Alb 2.5mg))  3 ml  RBID


 NEB


   8/23/19 08:00


    9/18/19 13:53





 


 Albuterol/


 Ipratropium


  (Duoneb (Ipr


 0.5mg/Alb 2.5mg))  3 ml  RQ8H  PRN


 NEB


 WHEEZING  8/22/19 20:15


     





 


 Aspirin


  (Aspirin


 Chewable)  81 mg  DAILY


 PO


   8/23/19 09:00


    9/18/19 09:11





 


 Atorvastatin


 Calcium


  (Lipitor)  80 mg  DAILY


 PO


   8/23/19 09:00


    9/18/19 09:11





 


 Bisacodyl


  (Dulcolax


 Suppository)  10 mg  DAILYPRN  PRN


 NM


 CONSTIPATION  8/22/19 20:15


     





 


 Bupropion HCl


  (Wellbutrin Xl)  150 mg  QAM


 PO


   8/23/19 09:00


    9/18/19 12:01





 


 Ciprofloxacin


  (Cipro)  500 mg  BID@06,18


 PO


   9/7/19 11:00


 9/13/19 23:00 DC 9/13/19 17:20





 


 Clopidogrel


 Bisulfate


  (PLAVix)  75 mg  DAILY


 PO


   8/23/19 09:00


    9/18/19 09:10





 


 Dextrose


  (Dextrose 50%)  25 ml  ASDIRECTED  PRN


 IV


 SEE LABEL COMMENTS  8/22/19 20:15


     





 


 Docusate Sodium


  (Colace)  100 mg  BID


 PO


   8/22/19 21:00


    9/18/19 09:10





 


 Enoxaparin Sodium


  (Lovenox)  40 mg  DAILY


 SC


   8/23/19 09:00


    9/18/19 09:11





 


 Fluoxetine HCl


  (PROzac)  20 mg  QHS


 PO


   8/23/19 21:00


    9/17/19 21:01





 


 Fluticasone


 Propionate


  (Flonase 0.05%


 Nasal Spray)  1 spray  BID


 NARES


   8/22/19 21:00


    9/17/19 21:02





 


 Glucagon


  (Glucagon)  1 mg  ASDIRECTED  PRN


 SC


 SEE LABEL COMMENTS  8/22/19 20:15


     





 


 Glucose


  (Glucose)  16 GM  ASDIRECTED  PRN


 PO


 SEE LABEL COMMENTS  8/22/19 20:15


     





 


 Guaifenesin


  (Robitussin Tab)  400 mg  TID


 PO


   8/23/19 16:00


    9/18/19 09:11





 


 Home Med


  (Med Rec


 Complete!)    ASDIRECTED


 XX


   8/22/19 21:15


 8/22/19 21:15 DC  





 


 Hydrochlorothiazide


  (Hydrodiuril)  25 mg  DAILY


 PO


   8/23/19 09:00


    9/18/19 09:10





 


 Insulin Human


 Lispro


  (HumaLOG INSULIN)  SEE


 PROTOCOL


 TABLE  AC


 SC


   8/23/19 07:30


    9/18/19 09:11





 


 Insulin Human


 Lispro


  (HumaLOG INSULIN)  SEE


 PROTOCOL


 TABLE  QHS


 SC


   8/22/19 21:00


    9/12/19 20:35





 


 Lactobacillus


 Acidophilus


  (Bacid)  1 ea  TID


 PO


   9/5/19 09:00


    9/18/19 09:11





 


 Latanoprost


  (Xalatan 0.005%


 Op Soln)  1 drop  QHS


 OU


   8/22/19 21:00


    9/17/19 21:03





 


 Lidocaine


  (Lidoderm Patch)  2 patch  QHS


 TD


   8/28/19 21:00


    9/17/19 21:04





 


 Lisinopril


  (Prinivil)  10 mg  QHS


 PO


   8/23/19 21:00


    9/17/19 21:02





 


 Magnesium


 Hydroxide


  (Milk Of


 Magnesia)  30 ml  DAILYPRN  PRN


 PO


 CONSTIPATION  8/22/19 20:15


     





 


 Menthol/Methyl


 Salicylate


  (Bengay Cream)  1 dose  TID


 TOP


   8/30/19 09:00


    9/17/19 17:20





 


 Miscellaneous


  (Unresolved


 Clarification


 Entry)  SEE LABEL


 COMMENTS  DAILY


 XX


   9/11/19 09:00


 9/11/19 11:28 DC  





 


 Miscellaneous


  (Unresolved


 Clarification


 Entry)  SEE LABEL


 COMMENTS  DAILY


 XX


   9/15/19 09:00


 9/15/19 16:08 DC  





 


 Miscellaneous


  (Unresolved


 Clarification


 Entry)  SEE LABEL


 COMMENTS  DAILY


 XX


   9/17/19 09:00


 9/17/19 17:49 DC  





 


 Non-Formulary


 Medication


  (** See Comment


 Field Below **)  REMOVE


 LIDODERM


 PATCH  DAILY@0900


 XX


   8/29/19 09:00


    9/18/19 09:13





 


 Nystatin


  (Mycostatin


 Powder, Nystop)    BID


 TOP


   8/25/19 21:00


    9/18/19 09:14





 


 Oxycodone HCl


  (Roxicodone,


 Oxyir)  5 mg  BID@1000,1300


 PO


   9/4/19 10:00


 9/9/19 14:24 DC 9/9/19 12:26





 


 Oxycodone HCl


  (Roxicodone,


 Oxyir)  5 mg  Q4HP  PRN


 PO


 PAIN  8/30/19 15:00


    9/5/19 17:00





 


 Oxycodone HCl


  (Roxicodone,


 Oxyir)  5 mg  TID@0700,1000,1300


 PO


   9/10/19 07:00


    9/17/19 14:13





 


 Pantoprazole


 Sodium


  (Protonix)  40 mg  DAILY


 PO


   8/23/19 09:00


    9/18/19 09:10





 


 Senna


  (Senokot)  1 tab  QHS


 PO


   8/22/19 21:00


    9/17/19 21:01





 


 Tolterodine


 Tartrate


  (Detrol La)  2 mg  BID


 PO


   9/6/19 09:00


    9/18/19 09:10





 


 Tolterodine


 Tartrate


  (Detrol La)  2 mg  DAILY@1500,2300


 PO


   9/5/19 15:00


 9/5/19 23:01 DC 9/5/19 22:31




















JASWANT ALFARO MD         Sep 18, 2019 16:30

## 2019-09-18 NOTE — IPNPDOC
PM&R Progress Note


DATE OF SERVICE:  Sep 17, 2019


Physiatrist Progress Note


Subjective:


Patient seen in her room stating she understands why it is important to stay at 

ARU until equipment arrives. Apology was provided to patient for not having her 

discharge planned as well as expected. 





REVIEW OF SYSTEMS: The following is a completed review of systems and has been 

reviewed. Review of systems otherwise unremarkable.


PAIN: Patient self reports no pain


EYES: No recent vision changes


EARS, NOSE, & THROAT: +dysphagia


CARDIOVASCULAR: Denies chest pain or palpitations


PULMONARY: Denies shortness of breath, except with exertion


GASTROINTESTINAL: Denies constipation/diarrhea


GENITOURINARY: denies dysuria


MUSCULOSKELETAL: LUE weakness and LLE weakness


NEUROLOGICAL: left sided paresis, facial droop


SKIN: no rash


PSYCHIATRIC: +schizophrenia


All other review of systems found to be negative.








PHYSICAL EXAMINATION:


VITAL SIGNS: Please see below.


GENERAL: Pleasant and cooperative. No acute distress. obese


HEENT: PERRL. Extraocular movements intact. Clear conjunctiva, left sided facial

droop


CARDIOVASCULAR: Regular rate and rhythm. No murmurs, rubs, or gallops


LUNGS: decreased breath sounds, no wheezes appreciated


ABDOMEN: Soft, nontender, nondistended. Positive bowel sounds. Normal active 

bowel sounds


NEUROLOGICAL: Alert and oriented times three. Cranial nerves II through XII 

grossly intact except left facial droop. Sensation grossly intact  to light 

touch all 4 extremities


equivocal babinksi bilat, no clonus


EXTREMITIES: 5\5 strength right upper extremities. flaccid LUE, 5\5 strength 

right lower extremity. 4/5 strength in left lower extremity. 





SKIN:keratotic, RLQ of her abdomen with ecchymosis, no induration, non-TTP








ASSESSMENT:54-year-old F with past medical history of obesity, HTN, who presents

status post stroke.





PLAN:


1. Rehab: PT- strengthen/stretch/maintain ROM bilat LE, improve gait and 

endurance, family training- trunk control improving and able to take steps in 

parallel bars


OT-strengthen/stretch/maintain ROM bilat UE, improve trunk control and ADL 

management- trunk control improving


-encouraged to work on mat exercises to engage core better to limit back spasms


SLP- significant dysphagia, MBS today, upgraded to level two, continue nectar


2. Neuro: recent CVA with new onset stroke in the bilateral basal ganglia and 

corona radiata- c/u ASA and Plavix, c/u statin, f/u with [facility] stroke [facility]


-[facility] for motor recovery


3. Cardio: pmh HTN, c/u lisinopril and HCTZ- medicine consulted to assist in 

management


4. Resp: heavy smoker, c/u Duonebs, GIULIANO c/u CPAP, monitor pulse-ox and for inf

ection


-CXR 8/23/19, no infiltrate


5. Psych- pmh schizophrenia c/u Wellbutryn


6. DVT ppx: lovenox and TEDs, Dopplers negative


7. GI ppx- protonix


8. : monitor PVRs, patient with increased urination, initial Ucx contaminated,

 s/p one time dose Fosfomycin 9/5/19, Detrol started, repeat UA + E. coli and 

Staph Warneri, s/p course of Ciprofloxacin 


9. Pain: chronic right sided low back pain, c/u lidoderm patch to low back qHS, 

c/u standing tylenol, c/u oxycodone 8am, 10 am and 1pm and q12h prn 


10. Skin: abdominal ecchymosis likely due transfers, will monitor- Hgb stable


9. Dispo: to home pending arrival of equipment, progressing slowly towards 

goals- patient's family is able to provide 24-7 care, family has been trained in

therapy for transfers, they have built a ramp and are putting up grab bars for 

d/c to home tomorrow. Patient and her sister are aware that this plan is not 

optimal or advised by ARU staff as patient is still inconsistent in her mobility

and that there is a high risk of fall at home. Patient and her sister understand

this risk and wish to proceed with a discharge to home tomorrow. Patient was 

repeatedly offered extra time on ARU to enhance her mobility, but has declined 

on multiple occasions. Therapy has trained family and have attempted to create 

the safest discharge given these circumstances.


Allergies


Coded Allergies:  


     No Known Allergies (Verified , 8/26/04)





Vital Signs





Vital Signs








  Date Time  Temp Pulse Resp B/P (MAP) Pulse Ox O2 Delivery O2 Flow Rate FiO2


 


9/18/19 14:00 97.4 87 20 121/67 (85) 93   











Laboratory Data


Labs 24H


Laboratory Tests 2


9/17/19 16:43: Bedside Glucose (Misc Panel) 113H


9/17/19 20:11: Bedside Glucose (Misc Panel) 158H


9/18/19 06:17: Bedside Glucose (Misc Panel) 163H


9/18/19 11:40: Bedside Glucose (Misc Panel) 89





Current Medications


Current Medications





Current Medications








 Medications


  (Trade)  Dose


 Ordered  Sig/Rose


 Route


 PRN Reason  Start Time


 Stop Time Status Last Admin


Dose Admin


 


 Acetaminophen


  (Tylenol Tab)  650 mg  Q4HP  PRN


 PO


 fever/MILD PAIN (PS 1-4)  8/22/19 20:15


 8/29/19 11:26 DC 8/28/19 08:30





 


 Acetaminophen


  (Tylenol Tab)  1,000 mg  TID


 PO


   8/29/19 16:00


    9/18/19 09:11





 


 Albuterol/


 Ipratropium


  (Duoneb (Ipr


 0.5mg/Alb 2.5mg))  3 ml  RBID


 NEB


   8/23/19 08:00


    9/18/19 13:53





 


 Albuterol/


 Ipratropium


  (Duoneb (Ipr


 0.5mg/Alb 2.5mg))  3 ml  RQ8H  PRN


 NEB


 WHEEZING  8/22/19 20:15


     





 


 Aspirin


  (Aspirin


 Chewable)  81 mg  DAILY


 PO


   8/23/19 09:00


    9/18/19 09:11





 


 Atorvastatin


 Calcium


  (Lipitor)  80 mg  DAILY


 PO


   8/23/19 09:00


    9/18/19 09:11





 


 Bisacodyl


  (Dulcolax


 Suppository)  10 mg  DAILYPRN  PRN


 OH


 CONSTIPATION  8/22/19 20:15


     





 


 Bupropion HCl


  (Wellbutrin Xl)  150 mg  QAM


 PO


   8/23/19 09:00


    9/18/19 12:01





 


 Ciprofloxacin


  (Cipro)  500 mg  BID@06,18


 PO


   9/7/19 11:00


 9/13/19 23:00 DC 9/13/19 17:20





 


 Clopidogrel


 Bisulfate


  (PLAVix)  75 mg  DAILY


 PO


   8/23/19 09:00


    9/18/19 09:10





 


 Dextrose


  (Dextrose 50%)  25 ml  ASDIRECTED  PRN


 IV


 SEE LABEL COMMENTS  8/22/19 20:15


     





 


 Docusate Sodium


  (Colace)  100 mg  BID


 PO


   8/22/19 21:00


    9/18/19 09:10





 


 Enoxaparin Sodium


  (Lovenox)  40 mg  DAILY


 SC


   8/23/19 09:00


    9/18/19 09:11





 


 Fluoxetine HCl


  (PROzac)  20 mg  QHS


 PO


   8/23/19 21:00


    9/17/19 21:01





 


 Fluticasone


 Propionate


  (Flonase 0.05%


 Nasal Spray)  1 spray  BID


 NARES


   8/22/19 21:00


    9/17/19 21:02





 


 Glucagon


  (Glucagon)  1 mg  ASDIRECTED  PRN


 SC


 SEE LABEL COMMENTS  8/22/19 20:15


     





 


 Glucose


  (Glucose)  16 GM  ASDIRECTED  PRN


 PO


 SEE LABEL COMMENTS  8/22/19 20:15


     





 


 Guaifenesin


  (Robitussin Tab)  400 mg  TID


 PO


   8/23/19 16:00


    9/18/19 09:11





 


 Home Med


  (Med Rec


 Complete!)    ASDIRECTED


 XX


   8/22/19 21:15


 8/22/19 21:15 DC  





 


 Hydrochlorothiazide


  (Hydrodiuril)  25 mg  DAILY


 PO


   8/23/19 09:00


    9/18/19 09:10





 


 Insulin Human


 Lispro


  (HumaLOG INSULIN)  SEE


 PROTOCOL


 TABLE  AC


 SC


   8/23/19 07:30


    9/18/19 09:11





 


 Insulin Human


 Lispro


  (HumaLOG INSULIN)  SEE


 PROTOCOL


 TABLE  QHS


 SC


   8/22/19 21:00


    9/12/19 20:35





 


 Lactobacillus


 Acidophilus


  (Bacid)  1 ea  TID


 PO


   9/5/19 09:00


    9/18/19 09:11





 


 Latanoprost


  (Xalatan 0.005%


 Op Soln)  1 drop  QHS


 OU


   8/22/19 21:00


    9/17/19 21:03





 


 Lidocaine


  (Lidoderm Patch)  2 patch  QHS


 TD


   8/28/19 21:00


    9/17/19 21:04





 


 Lisinopril


  (Prinivil)  10 mg  QHS


 PO


   8/23/19 21:00


    9/17/19 21:02





 


 Magnesium


 Hydroxide


  (Milk Of


 Magnesia)  30 ml  DAILYPRN  PRN


 PO


 CONSTIPATION  8/22/19 20:15


     





 


 Menthol/Methyl


 Salicylate


  (Bengay Cream)  1 dose  TID


 TOP


   8/30/19 09:00


    9/17/19 17:20





 


 Miscellaneous


  (Unresolved


 Clarification


 Entry)  SEE LABEL


 COMMENTS  DAILY


 XX


   9/11/19 09:00


 9/11/19 11:28 DC  





 


 Miscellaneous


  (Unresolved


 Clarification


 Entry)  SEE LABEL


 COMMENTS  DAILY


 XX


   9/15/19 09:00


 9/15/19 16:08 DC  





 


 Miscellaneous


  (Unresolved


 Clarification


 Entry)  SEE LABEL


 COMMENTS  DAILY


 XX


   9/17/19 09:00


 9/17/19 17:49 DC  





 


 Non-Formulary


 Medication


  (** See Comment


 Field Below **)  REMOVE


 LIDODERM


 PATCH  DAILY@0900


 XX


   8/29/19 09:00


    9/18/19 09:13





 


 Nystatin


  (Mycostatin


 Powder, Nystop)    BID


 TOP


   8/25/19 21:00


    9/18/19 09:14





 


 Oxycodone HCl


  (Roxicodone,


 Oxyir)  5 mg  BID@1000,1300


 PO


   9/4/19 10:00


 9/9/19 14:24 DC 9/9/19 12:26





 


 Oxycodone HCl


  (Roxicodone,


 Oxyir)  5 mg  Q4HP  PRN


 PO


 PAIN  8/30/19 15:00


    9/5/19 17:00





 


 Oxycodone HCl


  (Roxicodone,


 Oxyir)  5 mg  TID@0700,1000,1300


 PO


   9/10/19 07:00


    9/17/19 14:13





 


 Pantoprazole


 Sodium


  (Protonix)  40 mg  DAILY


 PO


   8/23/19 09:00


    9/18/19 09:10





 


 Senna


  (Senokot)  1 tab  QHS


 PO


   8/22/19 21:00


    9/17/19 21:01





 


 Tolterodine


 Tartrate


  (Detrol La)  2 mg  BID


 PO


   9/6/19 09:00


    9/18/19 09:10





 


 Tolterodine


 Tartrate


  (Detrol La)  2 mg  DAILY@1500,2300


 PO


   9/5/19 15:00


 9/5/19 23:01 DC 9/5/19 22:31




















JASWANT ALFARO MD         Sep 18, 2019 16:29

## 2019-09-19 VITALS — DIASTOLIC BLOOD PRESSURE: 60 MMHG | SYSTOLIC BLOOD PRESSURE: 180 MMHG

## 2019-09-19 VITALS — SYSTOLIC BLOOD PRESSURE: 122 MMHG | DIASTOLIC BLOOD PRESSURE: 61 MMHG

## 2019-09-19 VITALS — DIASTOLIC BLOOD PRESSURE: 63 MMHG | SYSTOLIC BLOOD PRESSURE: 124 MMHG

## 2019-09-19 LAB
BASOPHILS # BLD AUTO: 0.1 10^3/UL (ref 0–0.2)
BASOPHILS NFR BLD AUTO: 0.8 % (ref 0–1)
BUN SERPL-MCNC: 17 MG/DL (ref 7–18)
CALCIUM SERPL-MCNC: 9.5 MG/DL (ref 8.5–10.1)
CHLORIDE SERPL-SCNC: 99 MEQ/L (ref 98–107)
CO2 SERPL-SCNC: 30 MEQ/L (ref 21–32)
CREAT SERPL-MCNC: 0.72 MG/DL (ref 0.55–1.3)
EOSINOPHIL # BLD AUTO: 0.1 10^3/UL (ref 0–0.5)
EOSINOPHIL NFR BLD AUTO: 1.6 % (ref 0–3)
GFR SERPL CREATININE-BSD FRML MDRD: > 60 ML/MIN/{1.73_M2} (ref 51–?)
GLUCOSE SERPL-MCNC: 148 MG/DL (ref 70–100)
HCT VFR BLD AUTO: 42.3 % (ref 36–47)
HGB BLD-MCNC: 14.3 G/DL (ref 12–15.5)
LYMPHOCYTES # BLD AUTO: 1.6 10^3/UL (ref 1.5–5)
LYMPHOCYTES NFR BLD AUTO: 18.1 % (ref 24–44)
MCH RBC QN AUTO: 29.9 PG (ref 27–33)
MCHC RBC AUTO-ENTMCNC: 33.8 G/DL (ref 32–36.5)
MCV RBC AUTO: 88.3 FL (ref 80–96)
MONOCYTES # BLD AUTO: 0.5 10^3/UL (ref 0–0.8)
MONOCYTES NFR BLD AUTO: 5.6 % (ref 0–5)
NEUTROPHILS # BLD AUTO: 6.6 10^3/UL (ref 1.5–8.5)
NEUTROPHILS NFR BLD AUTO: 73.3 % (ref 36–66)
PLATELET # BLD AUTO: 297 10^3/UL (ref 150–450)
POTASSIUM SERPL-SCNC: 4.1 MEQ/L (ref 3.5–5.1)
RBC # BLD AUTO: 4.79 10^6/UL (ref 4–5.4)
SODIUM SERPL-SCNC: 135 MEQ/L (ref 136–145)
WBC # BLD AUTO: 8.9 10^3/UL (ref 4–10)

## 2019-09-19 RX ADMIN — IPRATROPIUM BROMIDE AND ALBUTEROL SULFATE SCH ML: .5; 3 SOLUTION RESPIRATORY (INHALATION) at 20:00

## 2019-09-19 RX ADMIN — MENTHOL, METHYL SALICYLATE SCH DOSE: 10; 15 CREAM TOPICAL at 09:00

## 2019-09-19 RX ADMIN — TOLTERODINE SCH MG: 2 CAPSULE, EXTENDED RELEASE ORAL at 22:04

## 2019-09-19 RX ADMIN — ENOXAPARIN SODIUM SCH MG: 40 INJECTION SUBCUTANEOUS at 09:08

## 2019-09-19 RX ADMIN — PROBIOTIC PRODUCT - TAB SCH EA: TAB at 17:24

## 2019-09-19 RX ADMIN — LIDOCAINE SCH PATCH: 50 PATCH CUTANEOUS at 22:06

## 2019-09-19 RX ADMIN — MENTHOL, METHYL SALICYLATE SCH DOSE: 10; 15 CREAM TOPICAL at 16:00

## 2019-09-19 RX ADMIN — PROBIOTIC PRODUCT - TAB SCH EA: TAB at 22:04

## 2019-09-19 RX ADMIN — LATANOPROST SCH DROP: 50 SOLUTION OPHTHALMIC at 22:06

## 2019-09-19 RX ADMIN — NYSTATIN SCH DOSE: 100000 POWDER TOPICAL at 22:06

## 2019-09-19 RX ADMIN — FLUTICASONE PROPIONATE SCH SPRAY: 50 SPRAY, METERED NASAL at 09:00

## 2019-09-19 RX ADMIN — INSULIN LISPRO SCH UNITS: 100 INJECTION, SOLUTION INTRAVENOUS; SUBCUTANEOUS at 13:04

## 2019-09-19 RX ADMIN — ACETAMINOPHEN SCH MG: 500 TABLET ORAL at 17:24

## 2019-09-19 RX ADMIN — FLUTICASONE PROPIONATE SCH SPRAY: 50 SPRAY, METERED NASAL at 22:06

## 2019-09-19 RX ADMIN — ACETAMINOPHEN SCH MG: 500 TABLET ORAL at 22:05

## 2019-09-19 RX ADMIN — MENTHOL, METHYL SALICYLATE SCH DOSE: 10; 15 CREAM TOPICAL at 22:06

## 2019-09-19 RX ADMIN — SENNOSIDES SCH TAB: 8.6 TABLET, FILM COATED ORAL at 22:07

## 2019-09-19 RX ADMIN — DOCUSATE SODIUM SCH MG: 100 CAPSULE, LIQUID FILLED ORAL at 22:05

## 2019-09-19 RX ADMIN — DOCUSATE SODIUM SCH MG: 100 CAPSULE, LIQUID FILLED ORAL at 09:08

## 2019-09-19 RX ADMIN — PROBIOTIC PRODUCT - TAB SCH EA: TAB at 09:09

## 2019-09-19 RX ADMIN — IPRATROPIUM BROMIDE AND ALBUTEROL SULFATE SCH ML: .5; 3 SOLUTION RESPIRATORY (INHALATION) at 07:20

## 2019-09-19 RX ADMIN — NYSTATIN SCH DOSE: 100000 POWDER TOPICAL at 09:10

## 2019-09-19 RX ADMIN — INSULIN LISPRO SCH UNITS: 100 INJECTION, SOLUTION INTRAVENOUS; SUBCUTANEOUS at 21:00

## 2019-09-19 RX ADMIN — BUPROPION HYDROCHLORIDE SCH MG: 150 TABLET, FILM COATED, EXTENDED RELEASE ORAL at 09:09

## 2019-09-19 RX ADMIN — TOLTERODINE SCH MG: 2 CAPSULE, EXTENDED RELEASE ORAL at 09:08

## 2019-09-19 RX ADMIN — LISINOPRIL SCH MG: 10 TABLET ORAL at 22:05

## 2019-09-19 RX ADMIN — INSULIN LISPRO SCH UNITS: 100 INJECTION, SOLUTION INTRAVENOUS; SUBCUTANEOUS at 17:25

## 2019-09-19 RX ADMIN — ASPIRIN 81 MG CHEWABLE TABLET SCH MG: 81 TABLET CHEWABLE at 09:08

## 2019-09-19 RX ADMIN — ATORVASTATIN CALCIUM SCH MG: 20 TABLET, FILM COATED ORAL at 09:08

## 2019-09-19 RX ADMIN — ACETAMINOPHEN SCH MG: 500 TABLET ORAL at 09:09

## 2019-09-19 RX ADMIN — Medication SCH: at 09:11

## 2019-09-19 RX ADMIN — PANTOPRAZOLE SODIUM SCH MG: 40 TABLET, DELAYED RELEASE ORAL at 09:08

## 2019-09-19 RX ADMIN — CLOPIDOGREL BISULFATE SCH MG: 75 TABLET ORAL at 09:09

## 2019-09-19 RX ADMIN — INSULIN LISPRO SCH UNITS: 100 INJECTION, SOLUTION INTRAVENOUS; SUBCUTANEOUS at 09:10

## 2019-09-19 NOTE — IPNPDOC
Date Seen


The patient was seen on 9/19/19.





Progress Note


Subjective:


Patient reports feeling that she was doing better each day and she feels 

significantly better than she did a couple of weeks ago when I last spoke to 

her. She denies any urinary symptoms at this time. She denies any chest pain, 

shortness of breath or palpitations. 





Objective:


Vitals (See below)





General: Sitting in a chair no acute distress, comfortable, AAOx3 she looks 

relaxed


HEENT: NC, AT, unchanged asymmetry.


CVS: RRR, no additional heart sounds appreciated.


Lungs: Fair air entry b/l, auscultation is free of rhonchi, rales or wheezing.


Abdomen: Soft, no distention, no tenderness, obese


Extremities: Lower extremities do not appear to reveal any edema, no Calf 

tenderness


Neuro: Upper and lower extremities on the left side with persistent weakness, 

cranial nerves appear grossly intact.


Psychological: Appropriate





microbiology: See below





Assessment and plan:


s/p Acute CVA, bilateral basal ganglia, stable


- c/w ASA, Plavix and Atorvastatin


- Physical therapy at the direction of ARU





Dysphagia / Slurred speech - 2/2 above


- c/w mechanical soft level 2 diet, nectar-thick liquids, stable





HTN


- Blood pressure controlled


- c/w HCTZ and Lisinopril





DM2


- c/w ISS, finger 6 well-controlled


 


GIULIANO on CPAP


- c/w CPAP





Mood disorder


- c/w Fluoxetine and Bupropion 





Urinary tract infection


-Resolved completed course of antibiotics





DVT prophylaxis


- c/w Lovenox





VS, I&O, 24H, Fishbone


Vital Signs/I&O





Vital Signs








  Date Time  Temp Pulse Resp B/P (MAP) Pulse Ox O2 Delivery O2 Flow Rate FiO2


 


9/19/19 06:53   18     


 


9/19/19 04:00 97.4 67  124/63 (83) 98   














I&O- Last 24 Hours up to 6 AM 


 


 9/19/19





 06:00


 


Intake Total 590 ml


 


Output Total 0 ml


 


Balance 590 ml











Laboratory Data


24H LABS


Laboratory Tests 2


9/18/19 11:40: Bedside Glucose (Misc Panel) 89


9/18/19 16:37: Bedside Glucose (Misc Panel) 122H


9/18/19 19:49: Bedside Glucose (Misc Panel) 190H


9/19/19 06:12: Bedside Glucose (Misc Panel) 163H


9/19/19 06:57: 


Immature Granulocyte % (Auto) 0.6, White Blood Count 8.9, Red Blood Count 4.79, 

Hemoglobin 14.3, Hematocrit 42.3, Mean Corpuscular Volume 88.3, Mean Corpuscular

Hemoglobin 29.9, Mean Corpuscular Hemoglobin Concent 33.8, Red Cell Distribution

Width 13.2, Platelet Count 297, Neutrophils (%) (Auto) 73.3H, Lymphocytes (%) 

(Auto) 18.1L, Monocytes (%) (Auto) 5.6H, Eosinophils (%) (Auto) 1.6, Basophils 

(%) (Auto) 0.8, Neutrophils # (Auto) 6.6, Lymphocytes # (Auto) 1.6, Monocytes # 

(Auto) 0.5, Eosinophils # (Auto) 0.1, Basophils # (Auto) 0.1, Nucleated Red Bl

ood Cells % (auto) 0.0, Anion Gap 6L, Glomerular Filtration Rate > 60.0, Blood 

Urea Nitrogen 17, Creatinine 0.72, Sodium Level 135L, Potassium Level 4.1, 

Chloride Level 99, Carbon Dioxide Level 30, Calcium Level 9.5


CBC/BMP


Laboratory Tests


9/19/19 06:57








Red Blood Count 4.79, Mean Corpuscular Volume 88.3, Mean Corpuscular Hemoglobin 

29.9, Mean Corpuscular Hemoglobin Concent 33.8, Red Cell Distribution Width 

13.2, Neutrophils (%) (Auto) 73.3 H, Lymphocytes (%) (Auto) 18.1 L, Monocytes 

(%) (Auto) 5.6 H, Eosinophils (%) (Auto) 1.6, Basophils (%) (Auto) 0.8, 

Neutrophils # (Auto) 6.6, Lymphocytes # (Auto) 1.6, Monocytes # (Auto) 0.5, 

Eosinophils # (Auto) 0.1, Basophils # (Auto) 0.1, Calcium Level 9.5











SHIRA ROMERO MD              Sep 19, 2019 11:20

## 2019-09-19 NOTE — IPNPDOC
PM&R Progress Note


DATE OF SERVICE:  Sep 19, 2019


Physiatrist Progress Note


Subjective:


Patient seen in her room wondering when she can get her equipment. She is happy 

she was able to use her left arm more in therapy toady. 





REVIEW OF SYSTEMS: The following is a completed review of systems and has been 

reviewed. Review of systems otherwise unremarkable.


PAIN: Patient self reports no pain


EYES: No recent vision changes


EARS, NOSE, & THROAT: +dysphagia


CARDIOVASCULAR: Denies chest pain or palpitations


PULMONARY: Denies shortness of breath, except with exertion


GASTROINTESTINAL: Denies constipation/diarrhea


GENITOURINARY: denies dysuria


MUSCULOSKELETAL: LUE weakness and LLE weakness


NEUROLOGICAL: left sided paresis, facial droop


SKIN: no rash


PSYCHIATRIC: +schizophrenia


All other review of systems found to be negative.








PHYSICAL EXAMINATION:


VITAL SIGNS: Please see below.


GENERAL: Pleasant and cooperative. No acute distress. obese


HEENT: PERRL. Extraocular movements intact. Clear conjunctiva, left sided facial

droop


CARDIOVASCULAR: Regular rate and rhythm. No murmurs, rubs, or gallops


LUNGS: decreased breath sounds, no wheezes appreciated


ABDOMEN: Soft, nontender, nondistended. Positive bowel sounds. Normal active 

bowel sounds


NEUROLOGICAL: Alert and oriented times three. Cranial nerves II through XII 

grossly intact except left facial droop. Sensation grossly intact  to light 

touch all 4 extremities


equivocal babinksi bilat, no clonus


EXTREMITIES: 5\5 strength right upper extremities. flaccid LUE, 5\5 strength 

right lower extremity. 4/5 strength in left lower extremity. 





SKIN:keratotic, RLQ of her abdomen with ecchymosis, no induration, non-TTP








ASSESSMENT:54-year-old F with past medical history of obesity, HTN, who presents

status post stroke.





PLAN:


1. Rehab: PT- strengthen/stretch/maintain ROM bilat LE, improve gait and 

endurance, family training- trunk control improving and able to take steps in 

parallel bars


OT-strengthen/stretch/maintain ROM bilat UE, improve trunk control and ADL 

management- trunk control improving


-encouraged to work on mat exercises to engage core better to limit back spasms


SLP- significant dysphagia, MBS today, upgraded to level two, continue nectar


2. Neuro: recent CVA with new onset stroke in the bilateral basal ganglia and 

corona radiata- c/u ASA and Plavix, c/u statin, f/u with [facility] stroke [facility]


-[facility]  qhs for motor recovery


3. Cardio: pmh HTN, c/u lisinopril and HCTZ- medicine consulted to assist in 

management


4. Resp: heavy smoker, c/u Duonebs, GIULIANO c/u CPAP, monitor pulse-ox and for 

infection


-CXR 8/23/19, no infiltrate


5. Psych- pmh schizophrenia c/u Wellbutryn


6. DVT ppx: lovenox and TEDs, Dopplers negative


7. GI ppx- protonix


8. : monitor PVRs, patient with increased urination, initial Ucx contaminated,

 s/p one time dose Fosfomycin 9/5/19, Detrol started, repeat UA + E. coli and 

Staph Warneri, s/p course of Ciprofloxacin 


9. Pain: chronic right sided low back pain, c/u lidoderm patch to low back qHS, 

c/u standing tylenol, c/u oxycodone 8am, 10 am and 1pm and q12h prn 


10. Skin: abdominal ecchymosis likely due transfers, will monitor- Hgb stable


9. Dispo: to home pending arrival of equipment, progressing slowly towards 

goals- patient's family is able to provide 24-7 care, family has been trained in

therapy for transfers, they have built a ramp and are putting up grab bars for 

d/c to home tomorrow. Patient and her sister are aware that this plan is not 

optimal or advised by ARU staff as patient is still inconsistent in her mobility

and that there is a high risk of fall at home. Patient and her sister understand

this risk and wish to proceed with a discharge to home tomorrow. Patient was 

repeatedly offered extra time on ARU to enhance her mobility, but has declined 

on multiple occasions. Therapy has trained family and have attempted to create 

the safest discharge given these circumstances.


Allergies


Coded Allergies:  


     No Known Allergies (Verified , 8/26/04)





Vital Signs





Vital Signs








  Date Time  Temp Pulse Resp B/P (MAP) Pulse Ox O2 Delivery O2 Flow Rate FiO2


 


9/19/19 06:53   18     


 


9/19/19 04:00 97.4 67  124/63 (83) 98   











Laboratory Data


CBC/BMP


Laboratory Tests


9/19/19 06:57








Red Blood Count 4.79, Mean Corpuscular Volume 88.3, Mean Corpuscular Hemoglobin 

29.9, Mean Corpuscular Hemoglobin Concent 33.8, Red Cell Distribution Width 

13.2, Neutrophils (%) (Auto) 73.3 H, Lymphocytes (%) (Auto) 18.1 L, Monocytes 

(%) (Auto) 5.6 H, Eosinophils (%) (Auto) 1.6, Basophils (%) (Auto) 0.8, 

Neutrophils # (Auto) 6.6, Lymphocytes # (Auto) 1.6, Monocytes # (Auto) 0.5, 

Eosinophils # (Auto) 0.1, Basophils # (Auto) 0.1, Calcium Level 9.5


Labs 24H


Laboratory Tests 2


9/18/19 11:40: Bedside Glucose (Misc Panel) 89


9/18/19 16:37: Bedside Glucose (Misc Panel) 122H


9/18/19 19:49: Bedside Glucose (Misc Panel) 190H


9/19/19 06:12: Bedside Glucose (Misc Panel) 163H


9/19/19 06:57: 


Immature Granulocyte % (Auto) 0.6, White Blood Count 8.9, Red Blood Count 4.79, 

Hemoglobin 14.3, Hematocrit 42.3, Mean Corpuscular Volume 88.3, Mean Corpuscular

Hemoglobin 29.9, Mean Corpuscular Hemoglobin Concent 33.8, Red Cell Distribution

Width 13.2, Platelet Count 297, Neutrophils (%) (Auto) 73.3H, Lymphocytes (%) 

(Auto) 18.1L, Monocytes (%) (Auto) 5.6H, Eosinophils (%) (Auto) 1.6, Basophils 

(%) (Auto) 0.8, Neutrophils # (Auto) 6.6, Lymphocytes # (Auto) 1.6, Monocytes # 

(Auto) 0.5, Eosinophils # (Auto) 0.1, Basophils # (Auto) 0.1, Nucleated Red 

Blood Cells % (auto) 0.0, Anion Gap 6L, Glomerular Filtration Rate > 60.0, Blood

Urea Nitrogen 17, Creatinine 0.72, Sodium Level 135L, Potassium Level 4.1, Ch

loride Level 99, Carbon Dioxide Level 30, Calcium Level 9.5





Current Medications


Current Medications





Current Medications








 Medications


  (Trade)  Dose


 Ordered  Sig/Rose


 Route


 PRN Reason  Start Time


 Stop Time Status Last Admin


Dose Admin


 


 Acetaminophen


  (Tylenol Tab)  650 mg  Q4HP  PRN


 PO


 fever/MILD PAIN (PS 1-4)  8/22/19 20:15


 8/29/19 11:26 DC 8/28/19 08:30





 


 Acetaminophen


  (Tylenol Tab)  1,000 mg  TID


 PO


   8/29/19 16:00


    9/19/19 09:09





 


 Albuterol/


 Ipratropium


  (Duoneb (Ipr


 0.5mg/Alb 2.5mg))  3 ml  RBID


 NEB


   8/23/19 08:00


    9/19/19 07:20





 


 Albuterol/


 Ipratropium


  (Duoneb (Ipr


 0.5mg/Alb 2.5mg))  3 ml  RQ8H  PRN


 NEB


 WHEEZING  8/22/19 20:15


     





 


 Aspirin


  (Aspirin


 Chewable)  81 mg  DAILY


 PO


   8/23/19 09:00


    9/19/19 09:08





 


 Atorvastatin


 Calcium


  (Lipitor)  80 mg  DAILY


 PO


   8/23/19 09:00


    9/19/19 09:08





 


 Bisacodyl


  (Dulcolax


 Suppository)  10 mg  DAILYPRN  PRN


 [place]


 CONSTIPATION  8/22/19 20:15


     





 


 Bupropion HCl


  (Wellbutrin Xl)  150 mg  QAM


 PO


   8/23/19 09:00


    9/19/19 09:09





 


 Ciprofloxacin


  (Cipro)  500 mg  BID@06,18


 PO


   9/7/19 11:00


 9/13/19 23:00 DC 9/13/19 17:20





 


 Clopidogrel


 Bisulfate


  (PLAVix)  75 mg  DAILY


 PO


   8/23/19 09:00


    9/19/19 09:09





 


 Dextrose


  (Dextrose 50%)  25 ml  ASDIRECTED  PRN


 IV


 SEE LABEL COMMENTS  8/22/19 20:15


     





 


 Docusate Sodium


  (Colace)  100 mg  BID


 PO


   8/22/19 21:00


    9/19/19 09:08





 


 Enoxaparin Sodium


  (Lovenox)  40 mg  DAILY


 SC


   8/23/19 09:00


    9/19/19 09:08





 


 Fluoxetine HCl


  (PROzac)  20 mg  QHS


 PO


   8/23/19 21:00


    9/18/19 21:22





 


 Fluticasone


 Propionate


  (Flonase 0.05%


 Nasal Spray)  1 spray  BID


 NARES


   8/22/19 21:00


    9/18/19 21:23





 


 Glucagon


  (Glucagon)  1 mg  ASDIRECTED  PRN


 SC


 SEE LABEL COMMENTS  8/22/19 20:15


     





 


 Glucose


  (Glucose)  16 GM  ASDIRECTED  PRN


 PO


 SEE LABEL COMMENTS  8/22/19 20:15


     





 


 Guaifenesin


  (Robitussin Tab)  400 mg  TID


 PO


   8/23/19 16:00


    9/19/19 09:08





 


 Home Med


  (Med Rec


 Complete!)    ASDIRECTED


 XX


   8/22/19 21:15


 8/22/19 21:15 DC  





 


 Hydrochlorothiazide


  (Hydrodiuril)  25 mg  DAILY


 PO


   8/23/19 09:00


    9/19/19 09:09





 


 Insulin Human


 Lispro


  (HumaLOG INSULIN)  SEE


 PROTOCOL


 TABLE  AC


 SC


   8/23/19 07:30


    9/19/19 09:10





 


 Insulin Human


 Lispro


  (HumaLOG INSULIN)  SEE


 PROTOCOL


 TABLE  QHS


 SC


   8/22/19 21:00


    9/12/19 20:35





 


 Lactobacillus


 Acidophilus


  (Bacid)  1 ea  TID


 PO


   9/5/19 09:00


    9/19/19 09:09





 


 Latanoprost


  (Xalatan 0.005%


 Op Soln)  1 drop  QHS


 OU


   8/22/19 21:00


    9/18/19 21:23





 


 Lidocaine


  (Lidoderm Patch)  2 patch  QHS


 TD


   8/28/19 21:00


    9/18/19 21:22





 


 Lisinopril


  (Prinivil)  10 mg  QHS


 PO


   8/23/19 21:00


    9/18/19 21:22





 


 Magnesium


 Hydroxide


  (Milk Of


 Magnesia)  30 ml  DAILYPRN  PRN


 PO


 CONSTIPATION  8/22/19 20:15


     





 


 Menthol/Methyl


 Salicylate


  (Bengay Cream)  1 dose  TID


 TOP


   8/30/19 09:00


    9/17/19 17:20





 


 Miscellaneous


  (Unresolved


 Clarification


 Entry)  SEE LABEL


 COMMENTS  DAILY


 XX


   9/11/19 09:00


 9/11/19 11:28 DC  





 


 Miscellaneous


  (Unresolved


 Clarification


 Entry)  SEE LABEL


 COMMENTS  DAILY


 XX


   9/15/19 09:00


 9/15/19 16:08 DC  





 


 Miscellaneous


  (Unresolved


 Clarification


 Entry)  SEE LABEL


 COMMENTS  DAILY


 XX


   9/17/19 09:00


 9/17/19 17:49 DC  





 


 Non-Formulary


 Medication


  (** See Comment


 Field Below **)  REMOVE


 LIDODERM


 PATCH  DAILY@0900


 XX


   8/29/19 09:00


    9/19/19 09:11





 


 Nystatin


  (Mycostatin


 Powder, Nystop)    BID


 TOP


   8/25/19 21:00


    9/19/19 09:10





 


 Oxycodone HCl


  (Roxicodone,


 Oxyir)  5 mg  BID@1000,1300


 PO


   9/4/19 10:00


 9/9/19 14:24 DC 9/9/19 12:26





 


 Oxycodone HCl


  (Roxicodone,


 Oxyir)  5 mg  Q4HP  PRN


 PO


 PAIN  8/30/19 15:00


    9/5/19 17:00





 


 Oxycodone HCl


  (Roxicodone,


 Oxyir)  5 mg  TID@0700,1000,1300


 PO


   9/10/19 07:00


    9/17/19 14:13





 


 Pantoprazole


 Sodium


  (Protonix)  40 mg  DAILY


 PO


   8/23/19 09:00


    9/19/19 09:08





 


 Senna


  (Senokot)  1 tab  QHS


 PO


   8/22/19 21:00


    9/18/19 21:22





 


 Tolterodine


 Tartrate


  (Detrol La)  2 mg  BID


 PO


   9/6/19 09:00


    9/19/19 09:08





 


 Tolterodine


 Tartrate


  (Detrol La)  2 mg  DAILY@1500,2300


 PO


   9/5/19 15:00


 9/5/19 23:01 DC 9/5/19 22:31




















JASWANT ALFARO MD         Sep 19, 2019 11:01

## 2019-09-20 VITALS — SYSTOLIC BLOOD PRESSURE: 120 MMHG | DIASTOLIC BLOOD PRESSURE: 65 MMHG

## 2019-09-20 VITALS — SYSTOLIC BLOOD PRESSURE: 133 MMHG | DIASTOLIC BLOOD PRESSURE: 76 MMHG

## 2019-09-20 LAB
BUN SERPL-MCNC: 18 MG/DL (ref 7–18)
CALCIUM SERPL-MCNC: 9.8 MG/DL (ref 8.5–10.1)
CHLORIDE SERPL-SCNC: 101 MEQ/L (ref 98–107)
CO2 SERPL-SCNC: 30 MEQ/L (ref 21–32)
CREAT SERPL-MCNC: 0.79 MG/DL (ref 0.55–1.3)
GFR SERPL CREATININE-BSD FRML MDRD: > 60 ML/MIN/{1.73_M2} (ref 51–?)
GLUCOSE SERPL-MCNC: 121 MG/DL (ref 70–100)
POTASSIUM SERPL-SCNC: 4.1 MEQ/L (ref 3.5–5.1)
SODIUM SERPL-SCNC: 137 MEQ/L (ref 136–145)

## 2019-09-20 RX ADMIN — FLUTICASONE PROPIONATE SCH SPRAY: 50 SPRAY, METERED NASAL at 07:54

## 2019-09-20 RX ADMIN — DOCUSATE SODIUM SCH MG: 100 CAPSULE, LIQUID FILLED ORAL at 07:52

## 2019-09-20 RX ADMIN — NYSTATIN SCH DOSE: 100000 POWDER TOPICAL at 07:54

## 2019-09-20 RX ADMIN — MENTHOL, METHYL SALICYLATE SCH DOSE: 10; 15 CREAM TOPICAL at 22:30

## 2019-09-20 RX ADMIN — INSULIN LISPRO SCH UNITS: 100 INJECTION, SOLUTION INTRAVENOUS; SUBCUTANEOUS at 12:38

## 2019-09-20 RX ADMIN — INSULIN LISPRO SCH UNITS: 100 INJECTION, SOLUTION INTRAVENOUS; SUBCUTANEOUS at 07:53

## 2019-09-20 RX ADMIN — NYSTATIN SCH DOSE: 100000 POWDER TOPICAL at 22:31

## 2019-09-20 RX ADMIN — TOLTERODINE SCH MG: 2 CAPSULE, EXTENDED RELEASE ORAL at 07:51

## 2019-09-20 RX ADMIN — Medication SCH: at 08:51

## 2019-09-20 RX ADMIN — TOLTERODINE SCH MG: 2 CAPSULE, EXTENDED RELEASE ORAL at 22:29

## 2019-09-20 RX ADMIN — BUPROPION HYDROCHLORIDE SCH MG: 150 TABLET, FILM COATED, EXTENDED RELEASE ORAL at 07:52

## 2019-09-20 RX ADMIN — LISINOPRIL SCH MG: 10 TABLET ORAL at 22:29

## 2019-09-20 RX ADMIN — CLOPIDOGREL BISULFATE SCH MG: 75 TABLET ORAL at 07:52

## 2019-09-20 RX ADMIN — PROBIOTIC PRODUCT - TAB SCH EA: TAB at 22:29

## 2019-09-20 RX ADMIN — INSULIN LISPRO SCH UNITS: 100 INJECTION, SOLUTION INTRAVENOUS; SUBCUTANEOUS at 21:00

## 2019-09-20 RX ADMIN — LIDOCAINE SCH PATCH: 50 PATCH CUTANEOUS at 22:28

## 2019-09-20 RX ADMIN — MENTHOL, METHYL SALICYLATE SCH DOSE: 10; 15 CREAM TOPICAL at 07:53

## 2019-09-20 RX ADMIN — PROBIOTIC PRODUCT - TAB SCH EA: TAB at 07:52

## 2019-09-20 RX ADMIN — INSULIN LISPRO SCH UNITS: 100 INJECTION, SOLUTION INTRAVENOUS; SUBCUTANEOUS at 17:28

## 2019-09-20 RX ADMIN — ACETAMINOPHEN SCH MG: 500 TABLET ORAL at 22:30

## 2019-09-20 RX ADMIN — ATORVASTATIN CALCIUM SCH MG: 20 TABLET, FILM COATED ORAL at 07:52

## 2019-09-20 RX ADMIN — FLUTICASONE PROPIONATE SCH SPRAY: 50 SPRAY, METERED NASAL at 22:30

## 2019-09-20 RX ADMIN — ACETAMINOPHEN SCH MG: 500 TABLET ORAL at 17:27

## 2019-09-20 RX ADMIN — PANTOPRAZOLE SODIUM SCH MG: 40 TABLET, DELAYED RELEASE ORAL at 07:51

## 2019-09-20 RX ADMIN — PROBIOTIC PRODUCT - TAB SCH EA: TAB at 17:27

## 2019-09-20 RX ADMIN — MENTHOL, METHYL SALICYLATE SCH DOSE: 10; 15 CREAM TOPICAL at 16:00

## 2019-09-20 RX ADMIN — LATANOPROST SCH DROP: 50 SOLUTION OPHTHALMIC at 22:30

## 2019-09-20 RX ADMIN — IPRATROPIUM BROMIDE AND ALBUTEROL SULFATE SCH ML: .5; 3 SOLUTION RESPIRATORY (INHALATION) at 08:00

## 2019-09-20 RX ADMIN — ENOXAPARIN SODIUM SCH MG: 40 INJECTION SUBCUTANEOUS at 07:53

## 2019-09-20 RX ADMIN — SENNOSIDES SCH TAB: 8.6 TABLET, FILM COATED ORAL at 22:29

## 2019-09-20 RX ADMIN — DOCUSATE SODIUM SCH MG: 100 CAPSULE, LIQUID FILLED ORAL at 22:29

## 2019-09-20 RX ADMIN — IPRATROPIUM BROMIDE AND ALBUTEROL SULFATE SCH ML: .5; 3 SOLUTION RESPIRATORY (INHALATION) at 20:13

## 2019-09-20 RX ADMIN — ASPIRIN 81 MG CHEWABLE TABLET SCH MG: 81 TABLET CHEWABLE at 07:51

## 2019-09-20 RX ADMIN — ACETAMINOPHEN SCH MG: 500 TABLET ORAL at 07:51

## 2019-09-20 NOTE — IPNPDOC
PM&R Progress Note


DATE OF SERVICE:  Sep 20, 2019


Physiatrist Progress Note


Subjective:


Patient seen in her room wondering when she can get her equipment. She is happy 

she was able to use her left arm more in therapy toady. 





REVIEW OF SYSTEMS: The following is a completed review of systems and has been 

reviewed. Review of systems otherwise unremarkable.


PAIN: Patient self reports no pain


EYES: No recent vision changes


EARS, NOSE, & THROAT: +dysphagia


CARDIOVASCULAR: Denies chest pain or palpitations


PULMONARY: Denies shortness of breath, except with exertion


GASTROINTESTINAL: Denies constipation/diarrhea


GENITOURINARY: denies dysuria


MUSCULOSKELETAL: LUE weakness and LLE weakness


NEUROLOGICAL: left sided paresis, facial droop


SKIN: no rash


PSYCHIATRIC: +schizophrenia


All other review of systems found to be negative.








PHYSICAL EXAMINATION:


VITAL SIGNS: Please see below.


GENERAL: Pleasant and cooperative. No acute distress. obese


HEENT: PERRL. Extraocular movements intact. Clear conjunctiva, left sided facial

droop


CARDIOVASCULAR: Regular rate and rhythm. No murmurs, rubs, or gallops


LUNGS: decreased breath sounds, no wheezes appreciated


ABDOMEN: Soft, nontender, nondistended. Positive bowel sounds. Normal active 

bowel sounds


NEUROLOGICAL: Alert and oriented times three. Cranial nerves II through XII 

grossly intact except left facial droop. Sensation grossly intact  to light 

touch all 4 extremities


equivocal babinksi bilat, no clonus


EXTREMITIES: 5\5 strength right upper extremities. flaccid LUE, 5\5 strength 

right lower extremity. 4/5 strength in left lower extremity. 





SKIN:keratotic, RLQ of her abdomen with ecchymosis, no induration, non-TTP








ASSESSMENT:54-year-old F with past medical history of obesity, HTN, who presents

status post stroke.





PLAN:


1. Rehab: PT- strengthen/stretch/maintain ROM bilat LE, improve gait and 

endurance, family training- trunk control improving and able to take steps in 

parallel bars


OT-strengthen/stretch/maintain ROM bilat UE, improve trunk control and ADL 

management- trunk control improving


-encouraged to work on mat exercises to engage core better to limit back spasms


SLP- significant dysphagia, MBS today, upgraded to level two, continue nectar


2. Neuro: recent CVA with new onset stroke in the bilateral basal ganglia and 

corona radiata- c/u ASA and Plavix, c/u statin, f/u with [facility] stroke clinic


-[facility]  qhs for motor recovery


3. Cardio: pmh HTN, c/u lisinopril and HCTZ- medicine consulted to assist in 

management


4. Resp: heavy smoker, c/u Duonebs, GIULIANO c/u CPAP, monitor pulse-ox and for 

infection


-CXR 8/23/19, no infiltrate


5. Psych- pmh schizophrenia c/u Wellbutryn


6. DVT ppx: lovenox and TEDs, Dopplers negative


7. GI ppx- protonix


8. : monitor PVRs, patient with increased urination, initial Ucx contaminated,

 s/p one time dose Fosfomycin 9/5/19, Detrol started, repeat UA + E. coli and 

Staph Warneri, s/p course of Ciprofloxacin 


9. Pain: chronic right sided low back pain, c/u lidoderm patch to low back qHS, 

c/u standing tylenol, c/u oxycodone 8am, 10 am and 1pm and q12h prn 


10. Skin: abdominal ecchymosis likely due transfers, will monitor- Hgb stable


9. Dispo: to home pending arrival of equipment, progressing slowly towards 

goals- patient's family is able to provide 24-7 care, family has been trained in

therapy for transfers, they have built a ramp and are putting up grab bars for 

d/c to home tomorrow. Patient and her sister are aware that this plan is not 

optimal or advised by ARU staff as patient is still inconsistent in her mobility

and that there is a high risk of fall at home. Patient and her sister understand

this risk and wish to proceed with a discharge to home tomorrow. Patient was 

repeatedly offered extra time on ARU to enhance her mobility, but has declined 

on multiple occasions. Therapy has trained family and have attempted to create 

the safest discharge given these circumstances.








DME needs


1. Drop Arm Commode- patient has hemiparesis and requires slide-board transfers 

to get from her wheelchair to the commode. She cannot stand or walk to a regular

commode safely.


2. Transfer board: patient has poor trunk control and needs a transfer board to 

help with functional transfers.


3. Semi-electric hospital bed- Patient will need a Hospital bed because she 

requires repositioning which she cannot due in an ordinary bed to prevent bed 

sores. She will need this for more than a month. She is morbidly obese with 

hemiparesis and at risk for developing bed sores so will need reposition herself

to relieve pressure. She will need alternating bed heights to transfer from the 

bed to a wheelchair. She is at increased risk for bed sores and needs frequent 

changes in body position.


4. Half rails- This will allow the patient to help her caregivers reposition her

self and help strengthen her upper body. It will also help her caregivers 

transfer her from the bed.


Allergies


Coded Allergies:  


     No Known Allergies (Verified , 8/26/04)





Vital Signs





Vital Signs








  Date Time  Temp Pulse Resp B/P (MAP) Pulse Ox O2 Delivery O2 Flow Rate FiO2


 


9/20/19 12:39   18     


 


9/20/19 06:00 97.3 71  133/76 (95) 93   











Laboratory Data


CBC/BMP


Laboratory Tests


9/20/19 11:20








Calcium Level 9.8


Labs 24H


Laboratory Tests 2


9/19/19 17:00: Bedside Glucose (Misc Panel) 115H


9/19/19 21:51: Bedside Glucose (Misc Panel) 138H


9/20/19 06:55: Bedside Glucose (Misc Panel) 165H


9/20/19 11:20: 


Anion Gap 6L, Glomerular Filtration Rate > 60.0, Blood Urea Nitrogen 18, 

Creatinine 0.79, Sodium Level 137, Potassium Level 4.1, Chloride Level 101, 

Carbon Dioxide Level 30, Calcium Level 9.8


9/20/19 11:27: Bedside Glucose (Misc Panel) 134H





Current Medications


Current Medications





Current Medications








 Medications


  (Trade)  Dose


 Ordered  Sig/Rose


 Route


 PRN Reason  Start Time


 Stop Time Status Last Admin


Dose Admin


 


 Acetaminophen


  (Tylenol Tab)  650 mg  Q4HP  PRN


 PO


 fever/MILD PAIN (PS 1-4)  8/22/19 20:15


 8/29/19 11:26 DC 8/28/19 08:30





 


 Acetaminophen


  (Tylenol Tab)  1,000 mg  TID


 PO


   8/29/19 16:00


    9/20/19 07:51





 


 Albuterol/


 Ipratropium


  (Duoneb (Ipr


 0.5mg/Alb 2.5mg))  3 ml  RBID


 NEB


   8/23/19 08:00


    9/19/19 07:20





 


 Albuterol/


 Ipratropium


  (Duoneb (Ipr


 0.5mg/Alb 2.5mg))  3 ml  RQ8H  PRN


 NEB


 WHEEZING  8/22/19 20:15


     





 


 Aspirin


  (Aspirin


 Chewable)  81 mg  DAILY


 PO


   8/23/19 09:00


    9/20/19 07:51





 


 Atorvastatin


 Calcium


  (Lipitor)  80 mg  DAILY


 PO


   8/23/19 09:00


    9/20/19 07:52





 


 Bisacodyl


  (Dulcolax


 Suppository)  10 mg  DAILYPRN  PRN


 [place]


 CONSTIPATION  8/22/19 20:15


     





 


 Bupropion HCl


  (Wellbutrin Xl)  150 mg  QAM


 PO


   8/23/19 09:00


    9/20/19 07:52





 


 Ciprofloxacin


  (Cipro)  500 mg  BID@06,18


 PO


   9/7/19 11:00


 9/13/19 23:00 DC 9/13/19 17:20





 


 Clopidogrel


 Bisulfate


  (PLAVix)  75 mg  DAILY


 PO


   8/23/19 09:00


    9/20/19 07:52





 


 Dextrose


  (Dextrose 50%)  25 ml  ASDIRECTED  PRN


 IV


 SEE LABEL COMMENTS  8/22/19 20:15


     





 


 Docusate Sodium


  (Colace)  100 mg  BID


 PO


   8/22/19 21:00


    9/20/19 07:52





 


 Enoxaparin Sodium


  (Lovenox)  40 mg  DAILY


 SC


   8/23/19 09:00


    9/20/19 07:53





 


 Fluoxetine HCl


  (PROzac)  20 mg  QHS


 PO


   8/23/19 21:00


    9/19/19 22:05





 


 Fluticasone


 Propionate


  (Flonase 0.05%


 Nasal Spray)  1 spray  BID


 NARES


   8/22/19 21:00


    9/20/19 07:54





 


 Glucagon


  (Glucagon)  1 mg  ASDIRECTED  PRN


 SC


 SEE LABEL COMMENTS  8/22/19 20:15


     





 


 Glucose


  (Glucose)  16 GM  ASDIRECTED  PRN


 PO


 SEE LABEL COMMENTS  8/22/19 20:15


     





 


 Guaifenesin


  (Robitussin Tab)  400 mg  TID


 PO


   8/23/19 16:00


    9/20/19 07:51





 


 Home Med


  (Med Rec


 Complete!)    ASDIRECTED


 XX


   8/22/19 21:15


 8/22/19 21:15 DC  





 


 Hydrochlorothiazide


  (Hydrodiuril)  25 mg  DAILY


 PO


   8/23/19 09:00


    9/20/19 07:52





 


 Insulin Human


 Lispro


  (HumaLOG INSULIN)  SEE


 PROTOCOL


 TABLE  AC


 SC


   8/23/19 07:30


    9/20/19 12:38





 


 Insulin Human


 Lispro


  (HumaLOG INSULIN)  SEE


 PROTOCOL


 TABLE  QHS


 SC


   8/22/19 21:00


    9/12/19 20:35





 


 Lactobacillus


 Acidophilus


  (Bacid)  1 ea  TID


 PO


   9/5/19 09:00


    9/20/19 07:52





 


 Latanoprost


  (Xalatan 0.005%


 Op Soln)  1 drop  QHS


 OU


   8/22/19 21:00


    9/19/19 22:06





 


 Lidocaine


  (Lidoderm Patch)  2 patch  QHS


 TD


   8/28/19 21:00


    9/19/19 22:06





 


 Lisinopril


  (Prinivil)  10 mg  QHS


 PO


   8/23/19 21:00


    9/19/19 22:05





 


 Magnesium


 Hydroxide


  (Milk Of


 Magnesia)  30 ml  DAILYPRN  PRN


 PO


 CONSTIPATION  8/22/19 20:15


     





 


 Menthol/Methyl


 Salicylate


  (Bengay Cream)  1 dose  TID


 TOP


   8/30/19 09:00


    9/19/19 22:06





 


 Miscellaneous


  (Unresolved


 Clarification


 Entry)  SEE LABEL


 COMMENTS  DAILY


 XX


   9/11/19 09:00


 9/11/19 11:28 DC  





 


 Miscellaneous


  (Unresolved


 Clarification


 Entry)  SEE LABEL


 COMMENTS  DAILY


 XX


   9/15/19 09:00


 9/15/19 16:08 DC  





 


 Miscellaneous


  (Unresolved


 Clarification


 Entry)  SEE LABEL


 COMMENTS  DAILY


 XX


   9/17/19 09:00


 9/17/19 17:49 DC  





 


 Non-Formulary


 Medication


  (** See Comment


 Field Below **)  REMOVE


 LIDODERM


 PATCH  DAILY@0900


 XX


   8/29/19 09:00


    9/20/19 08:51





 


 Nystatin


  (Mycostatin


 Powder, Nystop)    BID


 TOP


   8/25/19 21:00


    9/20/19 07:54





 


 Oxycodone HCl


  (Roxicodone,


 Oxyir)  5 mg  BID@1000,1300


 PO


   9/4/19 10:00


 9/9/19 14:24 DC 9/9/19 12:26





 


 Oxycodone HCl


  (Roxicodone,


 Oxyir)  5 mg  Q4HP  PRN


 PO


 PAIN  8/30/19 15:00


    9/5/19 17:00





 


 Oxycodone HCl


  (Roxicodone,


 Oxyir)  5 mg  TID@0700,1000,1300


 PO


   9/10/19 07:00


    9/20/19 10:06





 


 Pantoprazole


 Sodium


  (Protonix)  40 mg  DAILY


 PO


   8/23/19 09:00


    9/20/19 07:51





 


 Senna


  (Senokot)  1 tab  QHS


 PO


   8/22/19 21:00


    9/18/19 21:22





 


 Tolterodine


 Tartrate


  (Detrol La)  2 mg  BID


 PO


   9/6/19 09:00


    9/20/19 07:51





 


 Tolterodine


 Tartrate


  (Detrol La)  2 mg  DAILY@1500,2300


 PO


   9/5/19 15:00


 9/5/19 23:01 DC 9/5/19 22:31




















JASWANT ALFARO MD         Sep 20, 2019 12:46

## 2019-09-21 VITALS — SYSTOLIC BLOOD PRESSURE: 162 MMHG | DIASTOLIC BLOOD PRESSURE: 88 MMHG

## 2019-09-21 VITALS — SYSTOLIC BLOOD PRESSURE: 105 MMHG | DIASTOLIC BLOOD PRESSURE: 66 MMHG

## 2019-09-21 VITALS — DIASTOLIC BLOOD PRESSURE: 88 MMHG | SYSTOLIC BLOOD PRESSURE: 144 MMHG

## 2019-09-21 RX ADMIN — NYSTATIN SCH DOSE: 100000 POWDER TOPICAL at 08:19

## 2019-09-21 RX ADMIN — ACETAMINOPHEN SCH MG: 500 TABLET ORAL at 08:17

## 2019-09-21 RX ADMIN — PANTOPRAZOLE SODIUM SCH MG: 40 TABLET, DELAYED RELEASE ORAL at 08:16

## 2019-09-21 RX ADMIN — LISINOPRIL SCH MG: 10 TABLET ORAL at 20:29

## 2019-09-21 RX ADMIN — ACETAMINOPHEN SCH MG: 500 TABLET ORAL at 16:00

## 2019-09-21 RX ADMIN — INSULIN LISPRO SCH UNITS: 100 INJECTION, SOLUTION INTRAVENOUS; SUBCUTANEOUS at 20:31

## 2019-09-21 RX ADMIN — IPRATROPIUM BROMIDE AND ALBUTEROL SULFATE SCH ML: .5; 3 SOLUTION RESPIRATORY (INHALATION) at 20:40

## 2019-09-21 RX ADMIN — IPRATROPIUM BROMIDE AND ALBUTEROL SULFATE SCH ML: .5; 3 SOLUTION RESPIRATORY (INHALATION) at 07:30

## 2019-09-21 RX ADMIN — MENTHOL, METHYL SALICYLATE SCH DOSE: 10; 15 CREAM TOPICAL at 16:00

## 2019-09-21 RX ADMIN — FLUTICASONE PROPIONATE SCH SPRAY: 50 SPRAY, METERED NASAL at 20:31

## 2019-09-21 RX ADMIN — FLUTICASONE PROPIONATE SCH SPRAY: 50 SPRAY, METERED NASAL at 08:19

## 2019-09-21 RX ADMIN — ATORVASTATIN CALCIUM SCH MG: 20 TABLET, FILM COATED ORAL at 08:16

## 2019-09-21 RX ADMIN — SENNOSIDES SCH TAB: 8.6 TABLET, FILM COATED ORAL at 20:29

## 2019-09-21 RX ADMIN — PROBIOTIC PRODUCT - TAB SCH EA: TAB at 20:29

## 2019-09-21 RX ADMIN — ASPIRIN 81 MG CHEWABLE TABLET SCH MG: 81 TABLET CHEWABLE at 08:16

## 2019-09-21 RX ADMIN — DOCUSATE SODIUM SCH MG: 100 CAPSULE, LIQUID FILLED ORAL at 20:30

## 2019-09-21 RX ADMIN — Medication SCH: at 08:19

## 2019-09-21 RX ADMIN — CLOPIDOGREL BISULFATE SCH MG: 75 TABLET ORAL at 08:16

## 2019-09-21 RX ADMIN — ENOXAPARIN SODIUM SCH MG: 40 INJECTION SUBCUTANEOUS at 08:16

## 2019-09-21 RX ADMIN — TOLTERODINE SCH MG: 2 CAPSULE, EXTENDED RELEASE ORAL at 20:30

## 2019-09-21 RX ADMIN — DOCUSATE SODIUM SCH MG: 100 CAPSULE, LIQUID FILLED ORAL at 08:16

## 2019-09-21 RX ADMIN — PROBIOTIC PRODUCT - TAB SCH EA: TAB at 08:16

## 2019-09-21 RX ADMIN — INSULIN LISPRO SCH UNITS: 100 INJECTION, SOLUTION INTRAVENOUS; SUBCUTANEOUS at 17:26

## 2019-09-21 RX ADMIN — MENTHOL, METHYL SALICYLATE SCH DOSE: 10; 15 CREAM TOPICAL at 08:18

## 2019-09-21 RX ADMIN — TOLTERODINE SCH MG: 2 CAPSULE, EXTENDED RELEASE ORAL at 08:16

## 2019-09-21 RX ADMIN — LATANOPROST SCH DROP: 50 SOLUTION OPHTHALMIC at 20:31

## 2019-09-21 RX ADMIN — LIDOCAINE SCH PATCH: 50 PATCH CUTANEOUS at 20:34

## 2019-09-21 RX ADMIN — BUPROPION HYDROCHLORIDE SCH MG: 150 TABLET, FILM COATED, EXTENDED RELEASE ORAL at 08:16

## 2019-09-21 RX ADMIN — ACETAMINOPHEN SCH MG: 500 TABLET ORAL at 20:30

## 2019-09-21 RX ADMIN — MENTHOL, METHYL SALICYLATE SCH DOSE: 10; 15 CREAM TOPICAL at 20:33

## 2019-09-21 RX ADMIN — PROBIOTIC PRODUCT - TAB SCH EA: TAB at 16:08

## 2019-09-21 RX ADMIN — INSULIN LISPRO SCH UNITS: 100 INJECTION, SOLUTION INTRAVENOUS; SUBCUTANEOUS at 08:17

## 2019-09-21 RX ADMIN — INSULIN LISPRO SCH UNITS: 100 INJECTION, SOLUTION INTRAVENOUS; SUBCUTANEOUS at 12:12

## 2019-09-21 RX ADMIN — NYSTATIN SCH DOSE: 100000 POWDER TOPICAL at 20:34

## 2019-09-22 VITALS — DIASTOLIC BLOOD PRESSURE: 76 MMHG | SYSTOLIC BLOOD PRESSURE: 142 MMHG

## 2019-09-22 VITALS — DIASTOLIC BLOOD PRESSURE: 65 MMHG | SYSTOLIC BLOOD PRESSURE: 123 MMHG

## 2019-09-22 RX ADMIN — ATORVASTATIN CALCIUM SCH MG: 20 TABLET, FILM COATED ORAL at 07:41

## 2019-09-22 RX ADMIN — LATANOPROST SCH DROP: 50 SOLUTION OPHTHALMIC at 21:17

## 2019-09-22 RX ADMIN — ASPIRIN 81 MG CHEWABLE TABLET SCH MG: 81 TABLET CHEWABLE at 07:40

## 2019-09-22 RX ADMIN — TOLTERODINE SCH MG: 2 CAPSULE, EXTENDED RELEASE ORAL at 21:14

## 2019-09-22 RX ADMIN — LISINOPRIL SCH MG: 10 TABLET ORAL at 21:15

## 2019-09-22 RX ADMIN — FLUTICASONE PROPIONATE SCH SPRAY: 50 SPRAY, METERED NASAL at 21:17

## 2019-09-22 RX ADMIN — ACETAMINOPHEN SCH MG: 500 TABLET ORAL at 17:20

## 2019-09-22 RX ADMIN — IPRATROPIUM BROMIDE AND ALBUTEROL SULFATE SCH ML: .5; 3 SOLUTION RESPIRATORY (INHALATION) at 08:00

## 2019-09-22 RX ADMIN — MENTHOL, METHYL SALICYLATE SCH DOSE: 10; 15 CREAM TOPICAL at 16:00

## 2019-09-22 RX ADMIN — LIDOCAINE SCH PATCH: 50 PATCH CUTANEOUS at 21:00

## 2019-09-22 RX ADMIN — INSULIN LISPRO SCH UNITS: 100 INJECTION, SOLUTION INTRAVENOUS; SUBCUTANEOUS at 17:20

## 2019-09-22 RX ADMIN — BUPROPION HYDROCHLORIDE SCH MG: 150 TABLET, FILM COATED, EXTENDED RELEASE ORAL at 07:41

## 2019-09-22 RX ADMIN — Medication SCH: at 07:44

## 2019-09-22 RX ADMIN — FLUTICASONE PROPIONATE SCH SPRAY: 50 SPRAY, METERED NASAL at 07:45

## 2019-09-22 RX ADMIN — SENNOSIDES SCH TAB: 8.6 TABLET, FILM COATED ORAL at 21:15

## 2019-09-22 RX ADMIN — PANTOPRAZOLE SODIUM SCH MG: 40 TABLET, DELAYED RELEASE ORAL at 07:42

## 2019-09-22 RX ADMIN — IPRATROPIUM BROMIDE AND ALBUTEROL SULFATE SCH ML: .5; 3 SOLUTION RESPIRATORY (INHALATION) at 19:34

## 2019-09-22 RX ADMIN — DOCUSATE SODIUM SCH MG: 100 CAPSULE, LIQUID FILLED ORAL at 21:15

## 2019-09-22 RX ADMIN — ACETAMINOPHEN SCH MG: 500 TABLET ORAL at 21:15

## 2019-09-22 RX ADMIN — MENTHOL, METHYL SALICYLATE SCH DOSE: 10; 15 CREAM TOPICAL at 21:00

## 2019-09-22 RX ADMIN — INSULIN LISPRO SCH UNITS: 100 INJECTION, SOLUTION INTRAVENOUS; SUBCUTANEOUS at 07:40

## 2019-09-22 RX ADMIN — PROBIOTIC PRODUCT - TAB SCH EA: TAB at 21:15

## 2019-09-22 RX ADMIN — TOLTERODINE SCH MG: 2 CAPSULE, EXTENDED RELEASE ORAL at 07:41

## 2019-09-22 RX ADMIN — PROBIOTIC PRODUCT - TAB SCH EA: TAB at 07:42

## 2019-09-22 RX ADMIN — ACETAMINOPHEN SCH MG: 500 TABLET ORAL at 07:42

## 2019-09-22 RX ADMIN — LIDOCAINE SCH PATCH: 50 PATCH CUTANEOUS at 21:17

## 2019-09-22 RX ADMIN — NYSTATIN SCH DOSE: 100000 POWDER TOPICAL at 21:00

## 2019-09-22 RX ADMIN — DOCUSATE SODIUM SCH MG: 100 CAPSULE, LIQUID FILLED ORAL at 07:42

## 2019-09-22 RX ADMIN — PROBIOTIC PRODUCT - TAB SCH EA: TAB at 17:21

## 2019-09-22 RX ADMIN — NYSTATIN SCH DOSE: 100000 POWDER TOPICAL at 07:43

## 2019-09-22 RX ADMIN — MENTHOL, METHYL SALICYLATE SCH DOSE: 10; 15 CREAM TOPICAL at 07:42

## 2019-09-22 RX ADMIN — INSULIN LISPRO SCH UNITS: 100 INJECTION, SOLUTION INTRAVENOUS; SUBCUTANEOUS at 12:15

## 2019-09-22 RX ADMIN — CLOPIDOGREL BISULFATE SCH MG: 75 TABLET ORAL at 07:41

## 2019-09-22 RX ADMIN — INSULIN LISPRO SCH UNITS: 100 INJECTION, SOLUTION INTRAVENOUS; SUBCUTANEOUS at 21:00

## 2019-09-22 RX ADMIN — ENOXAPARIN SODIUM SCH MG: 40 INJECTION SUBCUTANEOUS at 07:39

## 2019-09-23 VITALS — SYSTOLIC BLOOD PRESSURE: 131 MMHG | DIASTOLIC BLOOD PRESSURE: 76 MMHG

## 2019-09-23 VITALS — SYSTOLIC BLOOD PRESSURE: 121 MMHG | DIASTOLIC BLOOD PRESSURE: 57 MMHG

## 2019-09-23 VITALS — SYSTOLIC BLOOD PRESSURE: 167 MMHG | DIASTOLIC BLOOD PRESSURE: 77 MMHG

## 2019-09-23 RX ADMIN — ACETAMINOPHEN SCH MG: 500 TABLET ORAL at 08:57

## 2019-09-23 RX ADMIN — PROBIOTIC PRODUCT - TAB SCH EA: TAB at 08:57

## 2019-09-23 RX ADMIN — ACETAMINOPHEN SCH MG: 500 TABLET ORAL at 20:54

## 2019-09-23 RX ADMIN — BUPROPION HYDROCHLORIDE SCH MG: 150 TABLET, FILM COATED, EXTENDED RELEASE ORAL at 08:58

## 2019-09-23 RX ADMIN — MENTHOL, METHYL SALICYLATE SCH DOSE: 10; 15 CREAM TOPICAL at 08:59

## 2019-09-23 RX ADMIN — DOCUSATE SODIUM SCH MG: 100 CAPSULE, LIQUID FILLED ORAL at 20:54

## 2019-09-23 RX ADMIN — INSULIN LISPRO SCH UNITS: 100 INJECTION, SOLUTION INTRAVENOUS; SUBCUTANEOUS at 20:57

## 2019-09-23 RX ADMIN — IPRATROPIUM BROMIDE AND ALBUTEROL SULFATE SCH ML: .5; 3 SOLUTION RESPIRATORY (INHALATION) at 20:21

## 2019-09-23 RX ADMIN — PROBIOTIC PRODUCT - TAB SCH EA: TAB at 20:54

## 2019-09-23 RX ADMIN — LIDOCAINE SCH PATCH: 50 PATCH CUTANEOUS at 20:54

## 2019-09-23 RX ADMIN — MENTHOL, METHYL SALICYLATE SCH DOSE: 10; 15 CREAM TOPICAL at 20:57

## 2019-09-23 RX ADMIN — ENOXAPARIN SODIUM SCH MG: 40 INJECTION SUBCUTANEOUS at 08:58

## 2019-09-23 RX ADMIN — PROBIOTIC PRODUCT - TAB SCH EA: TAB at 17:32

## 2019-09-23 RX ADMIN — FLUTICASONE PROPIONATE SCH SPRAY: 50 SPRAY, METERED NASAL at 08:59

## 2019-09-23 RX ADMIN — DOCUSATE SODIUM SCH MG: 100 CAPSULE, LIQUID FILLED ORAL at 08:58

## 2019-09-23 RX ADMIN — NYSTATIN SCH DOSE: 100000 POWDER TOPICAL at 20:57

## 2019-09-23 RX ADMIN — LATANOPROST SCH DROP: 50 SOLUTION OPHTHALMIC at 20:56

## 2019-09-23 RX ADMIN — ASPIRIN 81 MG CHEWABLE TABLET SCH MG: 81 TABLET CHEWABLE at 08:58

## 2019-09-23 RX ADMIN — ATORVASTATIN CALCIUM SCH MG: 20 TABLET, FILM COATED ORAL at 08:58

## 2019-09-23 RX ADMIN — TOLTERODINE SCH MG: 2 CAPSULE, EXTENDED RELEASE ORAL at 08:58

## 2019-09-23 RX ADMIN — PANTOPRAZOLE SODIUM SCH MG: 40 TABLET, DELAYED RELEASE ORAL at 08:58

## 2019-09-23 RX ADMIN — INSULIN LISPRO SCH UNITS: 100 INJECTION, SOLUTION INTRAVENOUS; SUBCUTANEOUS at 11:58

## 2019-09-23 RX ADMIN — IPRATROPIUM BROMIDE AND ALBUTEROL SULFATE SCH ML: .5; 3 SOLUTION RESPIRATORY (INHALATION) at 07:35

## 2019-09-23 RX ADMIN — FLUTICASONE PROPIONATE SCH SPRAY: 50 SPRAY, METERED NASAL at 20:56

## 2019-09-23 RX ADMIN — NYSTATIN SCH DOSE: 100000 POWDER TOPICAL at 08:59

## 2019-09-23 RX ADMIN — INSULIN LISPRO SCH UNITS: 100 INJECTION, SOLUTION INTRAVENOUS; SUBCUTANEOUS at 17:32

## 2019-09-23 RX ADMIN — ACETAMINOPHEN SCH MG: 500 TABLET ORAL at 17:32

## 2019-09-23 RX ADMIN — TOLTERODINE SCH MG: 2 CAPSULE, EXTENDED RELEASE ORAL at 20:54

## 2019-09-23 RX ADMIN — LIDOCAINE SCH PATCH: 50 PATCH CUTANEOUS at 21:00

## 2019-09-23 RX ADMIN — SENNOSIDES SCH TAB: 8.6 TABLET, FILM COATED ORAL at 20:54

## 2019-09-23 RX ADMIN — MENTHOL, METHYL SALICYLATE SCH DOSE: 10; 15 CREAM TOPICAL at 16:00

## 2019-09-23 RX ADMIN — Medication SCH: at 09:00

## 2019-09-23 RX ADMIN — LISINOPRIL SCH MG: 10 TABLET ORAL at 20:55

## 2019-09-23 RX ADMIN — INSULIN LISPRO SCH UNITS: 100 INJECTION, SOLUTION INTRAVENOUS; SUBCUTANEOUS at 08:57

## 2019-09-23 RX ADMIN — CLOPIDOGREL BISULFATE SCH MG: 75 TABLET ORAL at 08:57

## 2019-09-24 VITALS — DIASTOLIC BLOOD PRESSURE: 60 MMHG | SYSTOLIC BLOOD PRESSURE: 117 MMHG

## 2019-09-24 VITALS — DIASTOLIC BLOOD PRESSURE: 65 MMHG | SYSTOLIC BLOOD PRESSURE: 142 MMHG

## 2019-09-24 VITALS — DIASTOLIC BLOOD PRESSURE: 69 MMHG | SYSTOLIC BLOOD PRESSURE: 128 MMHG

## 2019-09-24 VITALS — SYSTOLIC BLOOD PRESSURE: 138 MMHG | DIASTOLIC BLOOD PRESSURE: 60 MMHG

## 2019-09-24 RX ADMIN — PROBIOTIC PRODUCT - TAB SCH EA: TAB at 16:58

## 2019-09-24 RX ADMIN — CLOPIDOGREL BISULFATE SCH MG: 75 TABLET ORAL at 08:59

## 2019-09-24 RX ADMIN — ACETAMINOPHEN SCH MG: 500 TABLET ORAL at 08:58

## 2019-09-24 RX ADMIN — DOCUSATE SODIUM SCH MG: 100 CAPSULE, LIQUID FILLED ORAL at 08:59

## 2019-09-24 RX ADMIN — MENTHOL, METHYL SALICYLATE SCH DOSE: 10; 15 CREAM TOPICAL at 21:00

## 2019-09-24 RX ADMIN — MENTHOL, METHYL SALICYLATE SCH DOSE: 10; 15 CREAM TOPICAL at 16:00

## 2019-09-24 RX ADMIN — FLUTICASONE PROPIONATE SCH SPRAY: 50 SPRAY, METERED NASAL at 21:00

## 2019-09-24 RX ADMIN — ATORVASTATIN CALCIUM SCH MG: 20 TABLET, FILM COATED ORAL at 08:58

## 2019-09-24 RX ADMIN — PROBIOTIC PRODUCT - TAB SCH EA: TAB at 21:41

## 2019-09-24 RX ADMIN — DOCUSATE SODIUM SCH MG: 100 CAPSULE, LIQUID FILLED ORAL at 21:00

## 2019-09-24 RX ADMIN — INSULIN LISPRO SCH UNITS: 100 INJECTION, SOLUTION INTRAVENOUS; SUBCUTANEOUS at 21:00

## 2019-09-24 RX ADMIN — LISINOPRIL SCH MG: 10 TABLET ORAL at 21:36

## 2019-09-24 RX ADMIN — PROBIOTIC PRODUCT - TAB SCH EA: TAB at 08:59

## 2019-09-24 RX ADMIN — LATANOPROST SCH DROP: 50 SOLUTION OPHTHALMIC at 21:47

## 2019-09-24 RX ADMIN — Medication SCH: at 09:00

## 2019-09-24 RX ADMIN — INSULIN LISPRO SCH UNITS: 100 INJECTION, SOLUTION INTRAVENOUS; SUBCUTANEOUS at 07:30

## 2019-09-24 RX ADMIN — MENTHOL, METHYL SALICYLATE SCH DOSE: 10; 15 CREAM TOPICAL at 07:55

## 2019-09-24 RX ADMIN — ACETAMINOPHEN SCH MG: 500 TABLET ORAL at 21:35

## 2019-09-24 RX ADMIN — NYSTATIN SCH DOSE: 100000 POWDER TOPICAL at 21:51

## 2019-09-24 RX ADMIN — PANTOPRAZOLE SODIUM SCH MG: 40 TABLET, DELAYED RELEASE ORAL at 08:59

## 2019-09-24 RX ADMIN — NYSTATIN SCH DOSE: 100000 POWDER TOPICAL at 07:55

## 2019-09-24 RX ADMIN — INSULIN LISPRO SCH UNITS: 100 INJECTION, SOLUTION INTRAVENOUS; SUBCUTANEOUS at 12:42

## 2019-09-24 RX ADMIN — ENOXAPARIN SODIUM SCH MG: 40 INJECTION SUBCUTANEOUS at 08:59

## 2019-09-24 RX ADMIN — LIDOCAINE SCH PATCH: 50 PATCH CUTANEOUS at 21:00

## 2019-09-24 RX ADMIN — SENNOSIDES SCH TAB: 8.6 TABLET, FILM COATED ORAL at 21:00

## 2019-09-24 RX ADMIN — INSULIN LISPRO SCH UNITS: 100 INJECTION, SOLUTION INTRAVENOUS; SUBCUTANEOUS at 16:59

## 2019-09-24 RX ADMIN — BUPROPION HYDROCHLORIDE SCH MG: 150 TABLET, FILM COATED, EXTENDED RELEASE ORAL at 08:59

## 2019-09-24 RX ADMIN — IPRATROPIUM BROMIDE AND ALBUTEROL SULFATE SCH ML: .5; 3 SOLUTION RESPIRATORY (INHALATION) at 20:08

## 2019-09-24 RX ADMIN — TOLTERODINE SCH MG: 2 CAPSULE, EXTENDED RELEASE ORAL at 21:35

## 2019-09-24 RX ADMIN — ACETAMINOPHEN SCH MG: 500 TABLET ORAL at 16:58

## 2019-09-24 RX ADMIN — IPRATROPIUM BROMIDE AND ALBUTEROL SULFATE SCH ML: .5; 3 SOLUTION RESPIRATORY (INHALATION) at 07:29

## 2019-09-24 RX ADMIN — FLUTICASONE PROPIONATE SCH SPRAY: 50 SPRAY, METERED NASAL at 07:55

## 2019-09-24 RX ADMIN — TOLTERODINE SCH MG: 2 CAPSULE, EXTENDED RELEASE ORAL at 08:59

## 2019-09-24 RX ADMIN — ASPIRIN 81 MG CHEWABLE TABLET SCH MG: 81 TABLET CHEWABLE at 08:59

## 2019-09-24 NOTE — IPNPDOC
PM&R Progress Note


DATE OF SERVICE:  Sep 23, 2019


Physiatrist Progress Note


Subjective:








REVIEW OF SYSTEMS: The following is a completed review of systems and has been 

reviewed. Review of systems otherwise unremarkable.


PAIN: Patient self reports no pain


EYES: No recent vision changes


EARS, NOSE, & THROAT: +dysphagia


CARDIOVASCULAR: Denies chest pain or palpitations


PULMONARY: Denies shortness of breath, except with exertion


GASTROINTESTINAL: Denies constipation/diarrhea


GENITOURINARY: denies dysuria


MUSCULOSKELETAL: LUE weakness and LLE weakness


NEUROLOGICAL: left sided paresis, facial droop


SKIN: no rash


PSYCHIATRIC: +schizophrenia


All other review of systems found to be negative.








PHYSICAL EXAMINATION:


VITAL SIGNS: Please see below.


GENERAL: Pleasant and cooperative. No acute distress. obese


HEENT: PERRL. Extraocular movements intact. Clear conjunctiva, left sided facial

droop


CARDIOVASCULAR: Regular rate and rhythm. No murmurs, rubs, or gallops


LUNGS: decreased breath sounds, no wheezes appreciated


ABDOMEN: Soft, nontender, nondistended. Positive bowel sounds. Normal active 

bowel sounds


NEUROLOGICAL: Alert and oriented times three. Cranial nerves II through XII 

grossly intact except left facial droop. Sensation grossly intact  to light 

touch all 4 extremities


equivocal babinksi bilat, no clonus


EXTREMITIES: 5\5 strength right upper extremities. flaccid LUE, 5\5 strength 

right lower extremity. 4/5 strength in left lower extremity. 





SKIN:keratotic, RLQ of her abdomen with ecchymosis, no induration, non-TTP








ASSESSMENT:54-year-old F with past medical history of obesity, HTN, who presents

status post stroke.





PLAN:


1. Rehab: PT- strengthen/stretch/maintain ROM bilat LE, improve gait and 

endurance, family training- trunk control improving and able to take steps in 

parallel bars


OT-strengthen/stretch/maintain ROM bilat UE, improve trunk control and ADL 

management- trunk control improving


-encouraged to work on mat exercises to engage core better to limit back spasms


SLP- significant dysphagia, MBS today, upgraded to level two, continue nectar


2. Neuro: recent CVA with new onset stroke in the bilateral basal ganglia and 

corona radiata- c/u ASA and Plavix, c/u statin, f/u with [facility] stroke [facility]


-proza  q for motor recovery


3. Cardio: pmh HTN, c/u lisinopril and HCTZ- medicine consulted to assist in 

management


4. Resp: heavy smoker, c/u Duonebs, GIULIANO c/u CPAP, monitor pulse-ox and for 

infection


-CXR 8/23/19, no infiltrate


5. Psych- pmh schizophrenia c/u Wellbutryn


6. DVT ppx: lovenox and TEDs, Dopplers negative


7. GI ppx- protonix


8. : monitor PVRs, patient with increased urination, initial Ucx contaminated,

 s/p one time dose Fosfomycin 9/5/19, Detrol started, repeat UA + E. coli and 

Staph Warneri, s/p course of Ciprofloxacin 


9. Pain: chronic right sided low back pain, c/u lidoderm patch to low back qHS, 

c/u standing tylenol, c/u oxycodone 8am, 10 am and 1pm and q12h prn 


10. Skin: abdominal ecchymosis likely due transfers, will monitor- Hgb stable


9. Dispo: to home pending arrival of equipment, progressing slowly towards 

goals- patient's family is able to provide 24-7 care, family has been trained in

therapy for transfers, they have built a ramp and are putting up grab bars for 

d/c to home tomorrow. Patient and her sister are aware that this plan is not 

optimal or advised by ARU staff as patient is still inconsistent in her mobility

and that there is a high risk of fall at home. Patient and her sister understand

this risk and wish to proceed with a discharge to home tomorrow. Patient was 

repeatedly offered extra time on ARU to enhance her mobility, but has declined 

on multiple occasions. Therapy has trained family and have attempted to create 

the safest discharge given these circumstances.


Allergies


Coded Allergies:  


     No Known Allergies (Verified , 8/26/04)





Vital Signs





Vital Signs








  Date Time  Temp Pulse Resp B/P (MAP) Pulse Ox O2 Delivery O2 Flow Rate FiO2


 


9/24/19 12:20   16     


 


9/24/19 06:07 96.8 63  142/65 (90) 95   











Laboratory Data


Labs 24H


Laboratory Tests 2


9/23/19 17:00: Bedside Glucose (Misc Panel) 153H


9/23/19 20:04: Bedside Glucose (Misc Panel) 164H


9/24/19 06:24: Bedside Glucose (Misc Panel) 174H


9/24/19 10:40: 


Urine Color YELLOW, Urine Appearance CLEAR, Urine pH 6.0, Urine Specific Gravity

1.013, Urine Protein 1+H, Urine Glucose (UA) NEGATIVE, Urine Ketones NEGATIVE, 

Urine Blood NEGATIVE, Urine Nitrite NEGATIVE, Urine Bilirubin NEGATIVE, Urine 

Urobilinogen 0.2, Urine Leukocyte Esterase NEGATIVE, Urine WBC (Auto) 3, Urine 

RBC (Auto) 3, Urine Hyaline Casts (Auto) 0, Urine Bacteria (Auto) NEGATIVE, 

Urine Squamous Epithelial Cells 1, Urine Amorphous Sediment SMALLH, Urine Mucus 

(Auto) SMALL, Urine Sperm (Auto) 


9/24/19 11:37: Bedside Glucose (Misc Panel) 242H





Current Medications


Current Medications





Current Medications








 Medications


  (Trade)  Dose


 Ordered  Sig/Rose


 Route


 PRN Reason  Start Time


 Stop Time Status Last Admin


Dose Admin


 


 Acetaminophen


  (Tylenol Tab)  650 mg  Q4HP  PRN


 PO


 fever/MILD PAIN (PS 1-4)  8/22/19 20:15


 8/29/19 11:26 DC 8/28/19 08:30





 


 Acetaminophen


  (Tylenol Tab)  1,000 mg  TID


 PO


   8/29/19 16:00


    9/24/19 08:58





 


 Albuterol/


 Ipratropium


  (Duoneb (Ipr


 0.5mg/Alb 2.5mg))  3 ml  RBID


 NEB


   8/23/19 08:00


    9/24/19 07:29





 


 Albuterol/


 Ipratropium


  (Duoneb (Ipr


 0.5mg/Alb 2.5mg))  3 ml  RQ8H  PRN


 NEB


 WHEEZING  8/22/19 20:15


     





 


 Aspirin


  (Aspirin


 Chewable)  81 mg  DAILY


 PO


   8/23/19 09:00


    9/24/19 08:59





 


 Atorvastatin


 Calcium


  (Lipitor)  80 mg  DAILY


 PO


   8/23/19 09:00


    9/24/19 08:58





 


 Bisacodyl


  (Dulcolax


 Suppository)  10 mg  DAILYPRN  PRN


 TX


 CONSTIPATION  8/22/19 20:15


     





 


 Bupropion HCl


  (Wellbutrin Xl)  150 mg  QAM


 PO


   8/23/19 09:00


    9/24/19 08:59





 


 Ciprofloxacin


  (Cipro)  500 mg  BID@06,18


 PO


   9/7/19 11:00


 9/13/19 23:00 DC 9/13/19 17:20





 


 Clopidogrel


 Bisulfate


  (PLAVix)  75 mg  DAILY


 PO


   8/23/19 09:00


    9/24/19 08:59





 


 Dextrose


  (Dextrose 50%)  25 ml  ASDIRECTED  PRN


 IV


 SEE LABEL COMMENTS  8/22/19 20:15


     





 


 Docusate Sodium


  (Colace)  100 mg  BID


 PO


   8/22/19 21:00


    9/24/19 08:59





 


 Enoxaparin Sodium


  (Lovenox)  40 mg  DAILY


 SC


   8/23/19 09:00


    9/24/19 08:59





 


 Fluoxetine HCl


  (PROzac)  20 mg  QHS


 PO


   8/23/19 21:00


    9/23/19 20:54





 


 Fluticasone


 Propionate


  (Flonase 0.05%


 Nasal Spray)  1 spray  BID


 NARES


   8/22/19 21:00


    9/23/19 20:56





 


 Glucagon


  (Glucagon)  1 mg  ASDIRECTED  PRN


 SC


 SEE LABEL COMMENTS  8/22/19 20:15


     





 


 Glucose


  (Glucose)  16 GM  ASDIRECTED  PRN


 PO


 SEE LABEL COMMENTS  8/22/19 20:15


     





 


 Guaifenesin


  (Robitussin Tab)  400 mg  TID


 PO


   8/23/19 16:00


    9/24/19 08:59





 


 Home Med


  (Med Rec


 Complete!)    ASDIRECTED


 XX


   8/22/19 21:15


 8/22/19 21:15 DC  





 


 Hydrochlorothiazide


  (Hydrodiuril)  25 mg  DAILY


 PO


   8/23/19 09:00


    9/24/19 08:59





 


 Insulin Human


 Lispro


  (HumaLOG INSULIN)  SEE


 PROTOCOL


 TABLE  AC


 SC


   8/23/19 07:30


    9/24/19 12:42





 


 Insulin Human


 Lispro


  (HumaLOG INSULIN)  SEE


 PROTOCOL


 TABLE  QHS


 SC


   8/22/19 21:00


    9/12/19 20:35





 


 Lactobacillus


 Acidophilus


  (Bacid)  1 ea  TID


 PO


   9/5/19 09:00


    9/24/19 08:59





 


 Latanoprost


  (Xalatan 0.005%


 Op Soln)  1 drop  QHS


 OU


   8/22/19 21:00


    9/23/19 20:56





 


 Lidocaine


  (Lidoderm Patch)  2 patch  QHS


 TD


   8/28/19 21:00


    9/21/19 20:34





 


 Lisinopril


  (Prinivil)  10 mg  QHS


 PO


   8/23/19 21:00


    9/23/19 20:55





 


 Magnesium


 Hydroxide


  (Milk Of


 Magnesia)  30 ml  DAILYPRN  PRN


 PO


 CONSTIPATION  8/22/19 20:15


     





 


 Menthol/Methyl


 Salicylate


  (Bengay Cream)  1 dose  TID


 TOP


   8/30/19 09:00


    9/24/19 07:55





 


 Miscellaneous


  (Unresolved


 Clarification


 Entry)  SEE LABEL


 COMMENTS  DAILY


 XX


   9/11/19 09:00


 9/11/19 11:28 DC  





 


 Miscellaneous


  (Unresolved


 Clarification


 Entry)  SEE LABEL


 COMMENTS  DAILY


 XX


   9/15/19 09:00


 9/15/19 16:08 DC  





 


 Miscellaneous


  (Unresolved


 Clarification


 Entry)  SEE LABEL


 COMMENTS  DAILY


 XX


   9/17/19 09:00


 9/17/19 17:49 DC  





 


 Miscellaneous


  (Unresolved


 Clarification


 Entry)  SEE LABEL


 COMMENTS  DAILY


 XX


   9/23/19 09:00


 9/24/19 10:47 DC  





 


 Non-Formulary


 Medication


  (** See Comment


 Field Below **)  REMOVE


 LIDODERM


 PATCH  DAILY@0900


 XX


   8/29/19 09:00


    9/22/19 07:44





 


 Nystatin


  (Mycostatin


 Powder, Nystop)    BID


 TOP


   8/25/19 21:00


    9/24/19 07:55





 


 Oxycodone HCl


  (Roxicodone,


 Oxyir)  5 mg  BID@1000,1300


 PO


   9/4/19 10:00


 9/9/19 14:24 DC 9/9/19 12:26





 


 Oxycodone HCl


  (Roxicodone,


 Oxyir)  5 mg  Q4HP  PRN


 PO


 PAIN  8/30/19 15:00


    9/5/19 17:00





 


 Oxycodone HCl


  (Roxicodone,


 Oxyir)  5 mg  TID@0700,1000,1300


 PO


   9/10/19 07:00


    9/21/19 06:28





 


 Pantoprazole


 Sodium


  (Protonix)  40 mg  DAILY


 PO


   8/23/19 09:00


    9/24/19 08:59





 


 Senna


  (Senokot)  1 tab  QHS


 PO


   8/22/19 21:00


    9/23/19 20:54





 


 Tolterodine


 Tartrate


  (Detrol La)  2 mg  BID


 PO


   9/6/19 09:00


    9/24/19 08:59





 


 Tolterodine


 Tartrate


  (Detrol La)  2 mg  DAILY@1500,2300


 PO


   9/5/19 15:00


 9/5/19 23:01 DC 9/5/19 22:31




















JASWANT ALFARO MD         Sep 24, 2019 16:02

## 2019-09-24 NOTE — IPNPDOC
PM&R Progress Note


DATE OF SERVICE:  Sep 24, 2019


Physiatrist Progress Note


Subjective:


Repeat UA





REVIEW OF SYSTEMS: The following is a completed review of systems and has been 

reviewed. Review of systems otherwise unremarkable.


PAIN: Patient self reports no pain


EYES: No recent vision changes


EARS, NOSE, & THROAT: +dysphagia


CARDIOVASCULAR: Denies chest pain or palpitations


PULMONARY: Denies shortness of breath, except with exertion


GASTROINTESTINAL: Denies constipation/diarrhea


GENITOURINARY: denies dysuria


MUSCULOSKELETAL: LUE weakness and LLE weakness


NEUROLOGICAL: left sided paresis, facial droop


SKIN: no rash


PSYCHIATRIC: +schizophrenia


All other review of systems found to be negative.








PHYSICAL EXAMINATION:


VITAL SIGNS: Please see below.


GENERAL: Pleasant and cooperative. No acute distress. obese


HEENT: PERRL. Extraocular movements intact. Clear conjunctiva, left sided facial

droop


CARDIOVASCULAR: Regular rate and rhythm. No murmurs, rubs, or gallops


LUNGS: decreased breath sounds, no wheezes appreciated


ABDOMEN: Soft, nontender, nondistended. Positive bowel sounds. Normal active 

bowel sounds


NEUROLOGICAL: Alert and oriented times three. Cranial nerves II through XII 

grossly intact except left facial droop. Sensation grossly intact  to light 

touch all 4 extremities


equivocal babinksi bilat, no clonus


EXTREMITIES: 5\5 strength right upper extremities. flaccid LUE, 5\5 strength 

right lower extremity. 4/5 strength in left lower extremity. 





SKIN:keratotic, RLQ of her abdomen with ecchymosis, no induration, non-TTP








ASSESSMENT:54-year-old F with past medical history of obesity, HTN, who presents

status post stroke.





PLAN:


1. Rehab: PT- strengthen/stretch/maintain ROM bilat LE, improve gait and enduran

ce, family training- trunk control improving and able to take steps in parallel 

bars


OT-strengthen/stretch/maintain ROM bilat UE, improve trunk control and ADL 

management- trunk control improving


-encouraged to work on mat exercises to engage core better to limit back spasms


SLP- significant dysphagia, MBS today, upgraded to level two, continue nectar


2. Neuro: recent CVA with new onset stroke in the bilateral basal ganglia and 

corona radiata- c/u ASA and Plavix, c/u statin, f/u with [facility] stroke [facility]


3. Cardio: pmh HTN, c/u lisinopril and HCTZ- medicine consulted to assist in 

management


4. Resp: heavy smoker, c/u Duonebs, GIULIANO c/u CPAP, monitor pulse-ox and for 

infection


-CXR 8/23/19, no infiltrate


5. Psych- pmh schizophrenia c/u Wellbutryn


6. DVT ppx: lovenox and TEDs, Dopplers negative


7. GI ppx- protonix


8. : monitor PVRs, patient with increased urination, initial Ucx contaminated,

 s/p one time dose Fosfomycin 9/5/19, Detrol started, repeat UA + E. coli and 

Staph Warneri, s/p course of Ciprofloxacin 


9. Pain: chronic right sided low back pain, c/u lidoderm patch to low back qHS, 

c/u standing tylenol, c/u oxycodone 8am, 10 am and 1pm and q12h prn 


10. Skin: abdominal ecchymosis likely due transfers, will monitor- Hgb stable


9. Dispo: to home pending arrival of equipment, progressing slowly towards 

goals- patient's family is able to provide 24-7 care, family has been trained in

therapy for transfers, they have built a ramp and are putting up grab bars for 

d/c to home tomorrow. Patient and her sister are aware that this plan is not 

optimal or advised by ARU staff as patient is still inconsistent in her mobility

and that there is a high risk of fall at home. Patient and her sister understand

this risk and wish to proceed with a discharge to home tomorrow. Patient was 

repeatedly offered extra time on ARU to enhance her mobility, but has declined 

on multiple occasions. Therapy has trained family and have attempted to create 

the safest discharge given these circumstances.


Allergies


Coded Allergies:  


     No Known Allergies (Verified , 8/26/04)





Vital Signs





Vital Signs








  Date Time  Temp Pulse Resp B/P (MAP) Pulse Ox O2 Delivery O2 Flow Rate FiO2


 


9/24/19 12:20   16     


 


9/24/19 06:07 96.8 63  142/65 (90) 95   











Laboratory Data


Labs 24H


Laboratory Tests 2


9/23/19 17:00: Bedside Glucose (Misc Panel) 153H


9/23/19 20:04: Bedside Glucose (Misc Panel) 164H


9/24/19 06:24: Bedside Glucose (Misc Panel) 174H


9/24/19 10:40: 


Urine Color YELLOW, Urine Appearance CLEAR, Urine pH 6.0, Urine Specific Gravity

1.013, Urine Protein 1+H, Urine Glucose (UA) NEGATIVE, Urine Ketones NEGATIVE, 

Urine Blood NEGATIVE, Urine Nitrite NEGATIVE, Urine Bilirubin NEGATIVE, Urine 

Urobilinogen 0.2, Urine Leukocyte Esterase NEGATIVE, Urine WBC (Auto) 3, Urine 

RBC (Auto) 3, Urine Hyaline Casts (Auto) 0, Urine Bacteria (Auto) NEGATIVE, 

Urine Squamous Epithelial Cells 1, Urine Amorphous Sediment SMALLH, Urine Mucus 

(Auto) SMALL, Urine Sperm (Auto) 


9/24/19 11:37: Bedside Glucose (Misc Panel) 242H





Current Medications


Current Medications





Current Medications








 Medications


  (Trade)  Dose


 Ordered  Sig/Rose


 Route


 PRN Reason  Start Time


 Stop Time Status Last Admin


Dose Admin


 


 Acetaminophen


  (Tylenol Tab)  650 mg  Q4HP  PRN


 PO


 fever/MILD PAIN (PS 1-4)  8/22/19 20:15


 8/29/19 11:26 DC 8/28/19 08:30





 


 Acetaminophen


  (Tylenol Tab)  1,000 mg  TID


 PO


   8/29/19 16:00


    9/24/19 08:58





 


 Albuterol/


 Ipratropium


  (Duoneb (Ipr


 0.5mg/Alb 2.5mg))  3 ml  RBID


 NEB


   8/23/19 08:00


    9/24/19 07:29





 


 Albuterol/


 Ipratropium


  (Duoneb (Ipr


 0.5mg/Alb 2.5mg))  3 ml  RQ8H  PRN


 NEB


 WHEEZING  8/22/19 20:15


     





 


 Aspirin


  (Aspirin


 Chewable)  81 mg  DAILY


 PO


   8/23/19 09:00


    9/24/19 08:59





 


 Atorvastatin


 Calcium


  (Lipitor)  80 mg  DAILY


 PO


   8/23/19 09:00


    9/24/19 08:58





 


 Bisacodyl


  (Dulcolax


 Suppository)  10 mg  DAILYPRN  PRN


 SD


 CONSTIPATION  8/22/19 20:15


     





 


 Bupropion HCl


  (Wellbutrin Xl)  150 mg  QAM


 PO


   8/23/19 09:00


    9/24/19 08:59





 


 Ciprofloxacin


  (Cipro)  500 mg  BID@06,18


 PO


   9/7/19 11:00


 9/13/19 23:00 DC 9/13/19 17:20





 


 Clopidogrel


 Bisulfate


  (PLAVix)  75 mg  DAILY


 PO


   8/23/19 09:00


    9/24/19 08:59





 


 Dextrose


  (Dextrose 50%)  25 ml  ASDIRECTED  PRN


 IV


 SEE LABEL COMMENTS  8/22/19 20:15


     





 


 Docusate Sodium


  (Colace)  100 mg  BID


 PO


   8/22/19 21:00


    9/24/19 08:59





 


 Enoxaparin Sodium


  (Lovenox)  40 mg  DAILY


 SC


   8/23/19 09:00


    9/24/19 08:59





 


 Fluoxetine HCl


  (PROzac)  20 mg  QHS


 PO


   8/23/19 21:00


    9/23/19 20:54





 


 Fluticasone


 Propionate


  (Flonase 0.05%


 Nasal Spray)  1 spray  BID


 NARES


   8/22/19 21:00


    9/23/19 20:56





 


 Glucagon


  (Glucagon)  1 mg  ASDIRECTED  PRN


 SC


 SEE LABEL COMMENTS  8/22/19 20:15


     





 


 Glucose


  (Glucose)  16 GM  ASDIRECTED  PRN


 PO


 SEE LABEL COMMENTS  8/22/19 20:15


     





 


 Guaifenesin


  (Robitussin Tab)  400 mg  TID


 PO


   8/23/19 16:00


    9/24/19 08:59





 


 Home Med


  (Med Rec


 Complete!)    ASDIRECTED


 XX


   8/22/19 21:15


 8/22/19 21:15 DC  





 


 Hydrochlorothiazide


  (Hydrodiuril)  25 mg  DAILY


 PO


   8/23/19 09:00


    9/24/19 08:59





 


 Insulin Human


 Lispro


  (HumaLOG INSULIN)  SEE


 PROTOCOL


 TABLE  AC


 SC


   8/23/19 07:30


    9/24/19 12:42





 


 Insulin Human


 Lispro


  (HumaLOG INSULIN)  SEE


 PROTOCOL


 TABLE  QHS


 SC


   8/22/19 21:00


    9/12/19 20:35





 


 Lactobacillus


 Acidophilus


  (Bacid)  1 ea  TID


 PO


   9/5/19 09:00


    9/24/19 08:59





 


 Latanoprost


  (Xalatan 0.005%


 Op Soln)  1 drop  QHS


 OU


   8/22/19 21:00


    9/23/19 20:56





 


 Lidocaine


  (Lidoderm Patch)  2 patch  QHS


 TD


   8/28/19 21:00


    9/21/19 20:34





 


 Lisinopril


  (Prinivil)  10 mg  QHS


 PO


   8/23/19 21:00


    9/23/19 20:55





 


 Magnesium


 Hydroxide


  (Milk Of


 Magnesia)  30 ml  DAILYPRN  PRN


 PO


 CONSTIPATION  8/22/19 20:15


     





 


 Menthol/Methyl


 Salicylate


  (Bengay Cream)  1 dose  TID


 TOP


   8/30/19 09:00


    9/24/19 07:55





 


 Miscellaneous


  (Unresolved


 Clarification


 Entry)  SEE LABEL


 COMMENTS  DAILY


 XX


   9/11/19 09:00


 9/11/19 11:28 DC  





 


 Miscellaneous


  (Unresolved


 Clarification


 Entry)  SEE LABEL


 COMMENTS  DAILY


 XX


   9/15/19 09:00


 9/15/19 16:08 DC  





 


 Miscellaneous


  (Unresolved


 Clarification


 Entry)  SEE LABEL


 COMMENTS  DAILY


 XX


   9/17/19 09:00


 9/17/19 17:49 DC  





 


 Miscellaneous


  (Unresolved


 Clarification


 Entry)  SEE LABEL


 COMMENTS  DAILY


 XX


   9/23/19 09:00


 9/24/19 10:47 DC  





 


 Non-Formulary


 Medication


  (** See Comment


 Field Below **)  REMOVE


 LIDODERM


 PATCH  DAILY@0900


 XX


   8/29/19 09:00


    9/22/19 07:44





 


 Nystatin


  (Mycostatin


 Powder, Nystop)    BID


 TOP


   8/25/19 21:00


    9/24/19 07:55





 


 Oxycodone HCl


  (Roxicodone,


 Oxyir)  5 mg  BID@1000,1300


 PO


   9/4/19 10:00


 9/9/19 14:24 DC 9/9/19 12:26





 


 Oxycodone HCl


  (Roxicodone,


 Oxyir)  5 mg  Q4HP  PRN


 PO


 PAIN  8/30/19 15:00


    9/5/19 17:00





 


 Oxycodone HCl


  (Roxicodone,


 Oxyir)  5 mg  TID@0700,1000,1300


 PO


   9/10/19 07:00


    9/21/19 06:28





 


 Pantoprazole


 Sodium


  (Protonix)  40 mg  DAILY


 PO


   8/23/19 09:00


    9/24/19 08:59





 


 Senna


  (Senokot)  1 tab  QHS


 PO


   8/22/19 21:00


    9/23/19 20:54





 


 Tolterodine


 Tartrate


  (Detrol La)  2 mg  BID


 PO


   9/6/19 09:00


    9/24/19 08:59





 


 Tolterodine


 Tartrate


  (Detrol La)  2 mg  DAILY@1500,2300


 PO


   9/5/19 15:00


 9/5/19 23:01 DC 9/5/19 22:31




















JASWANT ALFARO MD         Sep 24, 2019 16:02

## 2019-09-25 RX ADMIN — BUPROPION HYDROCHLORIDE SCH MG: 150 TABLET, FILM COATED, EXTENDED RELEASE ORAL at 09:42

## 2019-09-25 RX ADMIN — ATORVASTATIN CALCIUM SCH MG: 20 TABLET, FILM COATED ORAL at 09:42

## 2019-09-25 RX ADMIN — CLOPIDOGREL BISULFATE SCH MG: 75 TABLET ORAL at 09:43

## 2019-09-25 RX ADMIN — PROBIOTIC PRODUCT - TAB SCH EA: TAB at 09:43

## 2019-09-25 RX ADMIN — DOCUSATE SODIUM SCH MG: 100 CAPSULE, LIQUID FILLED ORAL at 09:42

## 2019-09-25 RX ADMIN — NYSTATIN SCH DOSE: 100000 POWDER TOPICAL at 09:45

## 2019-09-25 RX ADMIN — FLUTICASONE PROPIONATE SCH SPRAY: 50 SPRAY, METERED NASAL at 09:00

## 2019-09-25 RX ADMIN — PANTOPRAZOLE SODIUM SCH MG: 40 TABLET, DELAYED RELEASE ORAL at 09:43

## 2019-09-25 RX ADMIN — MENTHOL, METHYL SALICYLATE SCH DOSE: 10; 15 CREAM TOPICAL at 09:00

## 2019-09-25 RX ADMIN — IPRATROPIUM BROMIDE AND ALBUTEROL SULFATE SCH ML: .5; 3 SOLUTION RESPIRATORY (INHALATION) at 07:10

## 2019-09-25 RX ADMIN — Medication SCH: at 09:00

## 2019-09-25 RX ADMIN — ACETAMINOPHEN SCH MG: 500 TABLET ORAL at 09:00

## 2019-09-25 RX ADMIN — ASPIRIN 81 MG CHEWABLE TABLET SCH MG: 81 TABLET CHEWABLE at 09:43

## 2019-09-25 RX ADMIN — TOLTERODINE SCH MG: 2 CAPSULE, EXTENDED RELEASE ORAL at 09:43

## 2019-09-25 RX ADMIN — INSULIN LISPRO SCH UNITS: 100 INJECTION, SOLUTION INTRAVENOUS; SUBCUTANEOUS at 07:30

## 2019-09-25 RX ADMIN — ENOXAPARIN SODIUM SCH MG: 40 INJECTION SUBCUTANEOUS at 09:43

## 2019-10-24 ENCOUNTER — HOSPITAL ENCOUNTER (EMERGENCY)
Dept: HOSPITAL 53 - M ED | Age: 54
Discharge: HOME | End: 2019-10-24
Payer: COMMERCIAL

## 2019-10-24 VITALS — SYSTOLIC BLOOD PRESSURE: 120 MMHG | DIASTOLIC BLOOD PRESSURE: 60 MMHG

## 2019-10-24 VITALS — HEIGHT: 66 IN | WEIGHT: 293 LBS | BODY MASS INDEX: 47.09 KG/M2

## 2019-10-24 DIAGNOSIS — Z87.442: ICD-10-CM

## 2019-10-24 DIAGNOSIS — Z95.5: ICD-10-CM

## 2019-10-24 DIAGNOSIS — Z79.899: ICD-10-CM

## 2019-10-24 DIAGNOSIS — Z79.82: ICD-10-CM

## 2019-10-24 DIAGNOSIS — N28.1: ICD-10-CM

## 2019-10-24 DIAGNOSIS — Z87.448: ICD-10-CM

## 2019-10-24 DIAGNOSIS — I25.10: ICD-10-CM

## 2019-10-24 DIAGNOSIS — N39.0: ICD-10-CM

## 2019-10-24 DIAGNOSIS — E11.9: ICD-10-CM

## 2019-10-24 DIAGNOSIS — M54.5: Primary | ICD-10-CM

## 2019-10-24 DIAGNOSIS — I10: ICD-10-CM

## 2019-10-24 DIAGNOSIS — F17.200: ICD-10-CM

## 2019-10-24 DIAGNOSIS — Z87.59: ICD-10-CM

## 2019-10-24 DIAGNOSIS — Z86.73: ICD-10-CM

## 2019-10-24 LAB
ALBUMIN SERPL BCG-MCNC: 3.1 GM/DL (ref 3.2–5.2)
ALT SERPL W P-5'-P-CCNC: 10 U/L (ref 12–78)
BILIRUB CONJ SERPL-MCNC: 0.2 MG/DL (ref 0–0.2)
BILIRUB SERPL-MCNC: 0.7 MG/DL (ref 0.2–1)
HCT VFR BLD AUTO: 39.2 % (ref 36–47)
HGB BLD-MCNC: 13.8 G/DL (ref 12–15.5)
LIPASE SERPL-CCNC: 72 U/L (ref 73–393)
MCH RBC QN AUTO: 30.5 PG (ref 27–33)
MCHC RBC AUTO-ENTMCNC: 35.2 G/DL (ref 32–36.5)
MCV RBC AUTO: 86.7 FL (ref 80–96)
PLATELET # BLD AUTO: 382 10^3/UL (ref 150–450)
PROT SERPL-MCNC: 6.9 GM/DL (ref 6.4–8.2)
RBC # BLD AUTO: 4.52 10^6/UL (ref 4–5.4)
WBC # BLD AUTO: 14 10^3/UL (ref 4–10)

## 2019-10-24 PROCEDURE — 96360 HYDRATION IV INFUSION INIT: CPT

## 2019-10-24 PROCEDURE — 74177 CT ABD & PELVIS W/CONTRAST: CPT

## 2019-10-24 PROCEDURE — 80047 BASIC METABLC PNL IONIZED CA: CPT

## 2019-10-24 PROCEDURE — 36415 COLL VENOUS BLD VENIPUNCTURE: CPT

## 2019-10-24 PROCEDURE — 83605 ASSAY OF LACTIC ACID: CPT

## 2019-10-24 PROCEDURE — 87088 URINE BACTERIA CULTURE: CPT

## 2019-10-24 PROCEDURE — 96374 THER/PROPH/DIAG INJ IV PUSH: CPT

## 2019-10-24 PROCEDURE — 80076 HEPATIC FUNCTION PANEL: CPT

## 2019-10-24 PROCEDURE — 81001 URINALYSIS AUTO W/SCOPE: CPT

## 2019-10-24 PROCEDURE — 85027 COMPLETE CBC AUTOMATED: CPT

## 2019-10-24 PROCEDURE — 87040 BLOOD CULTURE FOR BACTERIA: CPT

## 2019-10-24 PROCEDURE — 99284 EMERGENCY DEPT VISIT MOD MDM: CPT

## 2019-10-24 PROCEDURE — 83690 ASSAY OF LIPASE: CPT

## 2019-10-24 PROCEDURE — 96361 HYDRATE IV INFUSION ADD-ON: CPT

## 2019-10-26 NOTE — ED PDOC
Post-Departure Follow-Up


dr henry faxed formal report of ct abd/p for fu mlg Lundborg-Gray,Maja MD          Oct 26, 2019 13:55

## 2020-01-22 ENCOUNTER — HOSPITAL ENCOUNTER (OUTPATIENT)
Dept: HOSPITAL 53 - M PT | Age: 55
LOS: 9 days | End: 2020-01-31
Attending: FAMILY MEDICINE
Payer: COMMERCIAL

## 2020-01-22 DIAGNOSIS — G81.94: Primary | ICD-10-CM

## 2020-01-24 ENCOUNTER — HOSPITAL ENCOUNTER (OUTPATIENT)
Dept: HOSPITAL 53 - M RAD | Age: 55
End: 2020-01-24
Attending: FAMILY MEDICINE
Payer: COMMERCIAL

## 2020-01-24 DIAGNOSIS — R63.4: ICD-10-CM

## 2020-01-24 DIAGNOSIS — R59.9: ICD-10-CM

## 2020-01-24 DIAGNOSIS — I31.3: Primary | ICD-10-CM

## 2020-01-24 DIAGNOSIS — E04.1: ICD-10-CM

## 2020-01-24 PROCEDURE — 76536 US EXAM OF HEAD AND NECK: CPT

## 2020-01-24 PROCEDURE — 71260 CT THORAX DX C+: CPT

## 2020-01-24 PROCEDURE — 74177 CT ABD & PELVIS W/CONTRAST: CPT

## 2020-01-24 NOTE — REP
CT of the abdomen and pelvis with IV and oral contrast for abnormal weight loss:

Comparisons are 10/24/2019 and 05/23/2009.

On the comparison studies there was a small pericardial effusion.  This is

unchanged.

The hepatic parenchyma is homogeneous.

The gallbladder, pancreas and spleen are normal size and unremarkable.

The adrenals are unremarkable. There is a 1.2 cm simple cyst at the lower pole of

the right kidney, unchanged.  The right left kidneys are otherwise, unremarkable.

The abdominal aorta is unremarkable.

There are a few normal-size mesenteric nodes in the upper abdomen.  These are

unchanged.  There is no retroperitoneal adenopathy or mass.

The bowel and mesentery are unremarkable.

Pelvis:

On the comparison study there was an ill-defined soft tissue density just

inferior to the aortic bifurcation down consistent with peritoneal fibrosis .

This is no longer present on the study today.

There is no pelvic adenopathy or mass.  The bladder is unremarkable.  The uterus

and adnexa are unremarkable.

There are no lytic, blastic or destructive skeletal changes.

Impression:

Chronic small pericardial effusion, unchanged.

Simple right renal cortical cyst, unchanged.

A few normal-size upper abdomen nodes, unchanged.

The ill-defined soft tissue density identified previously at the aortic

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

There is no adenopathy, mass or ascites.

There are no lytic, blastic or destructive skeletal changes.

 

 

Electronically Signed by

Kahlil Vargas MD 01/24/2020 05:28 P

## 2020-01-24 NOTE — REP
CT of the chest with IV contrast:

There are no comparison chest CT studies.

There are no lung masses or nodules filtrates or pleural effusions.

There is no mediastinal, hilar or axillary adenopathy.

The right lobe of the thyroid is mildly enlarged and contains nodules.  Thyroid

ultrasound follow-up might be considered.

The thoracic aorta is unremarkable.  The cardiac size is normal.

Impression:

There is no adenopathy.

Lung fields are clear.

Nodules in the right lobe of the thyroid.  The right lobe of the thyroid appears

enlarged.  Consider follow-up thyroid ultrasound.

 

 

Electronically Signed by

Kahlil Vargas MD 01/24/2020 05:13 P

## 2020-01-24 NOTE — REP
Thyroid ultrasound for palpable neck lumps and abnormal weight loss:

The thyroid right lobe measures 4.7 x 2.6 x 1.9 cm.

The thyroid left lobe measures 4.5 x 2.0 x 1.4 cm.

The isthmus measures 6 mm thickness.

The thyroid gland is diffusely enlarged.

There is a solid nodule in the mid pole right lobe measuring 1.7 x 1.0 x 0.8 cm.

There is a solid nodule in the lower pole right lobe measuring 1.0 x 1.8 x 1.5

cm.  The

There are no solid nodules in the left lobe.

There are no cysts in either the right on the left lobe.

Impression:

Diffusely enlarged thyroid.

There are two solid nodules in the right lobe as described.

 

Consideration might be given to a radionuclide thyroid scan.

Depending on clinical factors ultrasound-guided biopsy of the right lobe solid

nodules might be considered.

 

 

Electronically Signed by

Kahlil Vargas MD 01/24/2020 05:07 P

## 2020-02-18 ENCOUNTER — HOSPITAL ENCOUNTER (OUTPATIENT)
Dept: HOSPITAL 53 - M ST | Age: 55
End: 2020-02-18
Attending: FAMILY MEDICINE
Payer: COMMERCIAL

## 2020-02-18 DIAGNOSIS — G81.94: Primary | ICD-10-CM

## 2020-02-18 NOTE — REP
Examination Requested: Cookie Swallow

 

Reason For Exam: Dysphasia

 

The procedure was performed by ANI Hung, under the direct

supervision of Dr. Garcia.

 

The procedure was performed with Tena Savage from speech pathology present.

 

5 ml aliquots of thin, pudding, mixed fruit, soft food, nectar and pill

consistency barium was administered. Aspiration was visualized with thin

consistency barium.  Inconsistent flash penetration was visualized with nectar

thick consistency barium.

 

The detailed report of this examination will be provided by speech pathology.

 

1.5 minutes of fluoroscopy time was utilized for this procedure.

 

 

Reviewed by

ANI Merida 02/18/2020 02:53 P

Electronically Signed by

Andre Garcia MD 02/18/2020 03:47 P

## 2020-02-20 ENCOUNTER — HOSPITAL ENCOUNTER (OUTPATIENT)
Dept: HOSPITAL 53 - M ED | Age: 55
Setting detail: OBSERVATION
LOS: 4 days | Discharge: HOME | End: 2020-02-24
Attending: INTERNAL MEDICINE | Admitting: INTERNAL MEDICINE
Payer: COMMERCIAL

## 2020-02-20 VITALS — DIASTOLIC BLOOD PRESSURE: 53 MMHG | SYSTOLIC BLOOD PRESSURE: 105 MMHG

## 2020-02-20 VITALS — SYSTOLIC BLOOD PRESSURE: 92 MMHG | DIASTOLIC BLOOD PRESSURE: 49 MMHG

## 2020-02-20 VITALS — DIASTOLIC BLOOD PRESSURE: 54 MMHG | SYSTOLIC BLOOD PRESSURE: 108 MMHG

## 2020-02-20 VITALS — BODY MASS INDEX: 37.98 KG/M2 | WEIGHT: 222.47 LBS | HEIGHT: 64 IN

## 2020-02-20 DIAGNOSIS — E78.5: ICD-10-CM

## 2020-02-20 DIAGNOSIS — Z79.899: ICD-10-CM

## 2020-02-20 DIAGNOSIS — Z79.82: ICD-10-CM

## 2020-02-20 DIAGNOSIS — N17.9: ICD-10-CM

## 2020-02-20 DIAGNOSIS — I95.9: Primary | ICD-10-CM

## 2020-02-20 DIAGNOSIS — I69.354: ICD-10-CM

## 2020-02-20 DIAGNOSIS — R53.83: ICD-10-CM

## 2020-02-20 DIAGNOSIS — E66.9: ICD-10-CM

## 2020-02-20 DIAGNOSIS — I10: ICD-10-CM

## 2020-02-20 DIAGNOSIS — Z79.02: ICD-10-CM

## 2020-02-20 LAB
ALBUMIN SERPL BCG-MCNC: 3.4 GM/DL (ref 3.2–5.2)
ALT SERPL W P-5'-P-CCNC: 12 U/L (ref 12–78)
BASOPHILS # BLD AUTO: 0.1 10^3/UL (ref 0–0.2)
BASOPHILS NFR BLD AUTO: 1 % (ref 0–1)
BILIRUB SERPL-MCNC: 0.2 MG/DL (ref 0.2–1)
BUN SERPL-MCNC: 69 MG/DL (ref 7–18)
CALCIUM SERPL-MCNC: 9.6 MG/DL (ref 8.5–10.1)
CHLORIDE SERPL-SCNC: 99 MEQ/L (ref 98–107)
CK SERPL-CCNC: 39 U/L (ref 26–192)
CO2 SERPL-SCNC: 32 MEQ/L (ref 21–32)
CREAT SERPL-MCNC: 2.27 MG/DL (ref 0.55–1.3)
EOSINOPHIL # BLD AUTO: 0.3 10^3/UL (ref 0–0.5)
EOSINOPHIL NFR BLD AUTO: 3.3 % (ref 0–3)
ERYTHROCYTE [SEDIMENTATION RATE] IN BLOOD BY WESTERGREN METHOD: 43 MM/HR (ref 0–30)
GFR SERPL CREATININE-BSD FRML MDRD: 23.8 ML/MIN/{1.73_M2} (ref 51–?)
GLUCOSE SERPL-MCNC: 124 MG/DL (ref 70–100)
HCT VFR BLD AUTO: 38.2 % (ref 36–47)
HGB BLD-MCNC: 12.4 G/DL (ref 12–15.5)
LYMPHOCYTES # BLD AUTO: 1.7 10^3/UL (ref 1.5–5)
LYMPHOCYTES NFR BLD AUTO: 18.7 % (ref 24–44)
MAGNESIUM SERPL-MCNC: 2.2 MG/DL (ref 1.8–2.4)
MCH RBC QN AUTO: 27.7 PG (ref 27–33)
MCHC RBC AUTO-ENTMCNC: 32.5 G/DL (ref 32–36.5)
MCV RBC AUTO: 85.5 FL (ref 80–96)
MONOCYTES # BLD AUTO: 0.4 10^3/UL (ref 0–0.8)
MONOCYTES NFR BLD AUTO: 4.8 % (ref 0–5)
NEUTROPHILS # BLD AUTO: 6.5 10^3/UL (ref 1.5–8.5)
NEUTROPHILS NFR BLD AUTO: 71.8 % (ref 36–66)
PLATELET # BLD AUTO: 326 10^3/UL (ref 150–450)
POTASSIUM SERPL-SCNC: 3.8 MEQ/L (ref 3.5–5.1)
PROT SERPL-MCNC: 7.3 GM/DL (ref 6.4–8.2)
RBC # BLD AUTO: 4.47 10^6/UL (ref 4–5.4)
SODIUM SERPL-SCNC: 134 MEQ/L (ref 136–145)
URATE SERPL-MCNC: 14.2 MG/DL (ref 2.6–6)
WBC # BLD AUTO: 9.1 10^3/UL (ref 4–10)

## 2020-02-20 RX ADMIN — SODIUM CHLORIDE SCH MLS/HR: 9 INJECTION, SOLUTION INTRAVENOUS at 20:24

## 2020-02-20 RX ADMIN — INSULIN LISPRO SCH UNITS: 100 INJECTION, SOLUTION INTRAVENOUS; SUBCUTANEOUS at 20:27

## 2020-02-20 RX ADMIN — NYSTATIN SCH DOSE: 100000 POWDER TOPICAL at 20:26

## 2020-02-20 RX ADMIN — INSULIN LISPRO SCH UNITS: 100 INJECTION, SOLUTION INTRAVENOUS; SUBCUTANEOUS at 17:30

## 2020-02-20 RX ADMIN — SODIUM CHLORIDE SCH MLS/HR: 9 INJECTION, SOLUTION INTRAVENOUS at 17:02

## 2020-02-20 NOTE — ECGEPIP
[facility] - ED

                                       

                                       Test Date:    2020

Pat Name:     JOCELYN EPPS           Department:   

Patient ID:   [id number]                 Room:         -

Gender:       Female                   Technician:   brneda

:          1965               Requested By: Viki Thompson 

Order Number: [id number]     Reading MD:   Elliot Cabral

                                 Measurements

Intervals                              Axis          

Rate:         50                       P:            58

SC:           182                      QRS:          62

QRSD:         82                       T:            68

QT:           434                                    

QTc:          398                                    

                           Interpretive Statements

SINUS BRADYCARDIA

NSTTW ABNORMALITIES

SIMILAR TO 19

Electronically Signed on 2020 18:45:58 EST by Elliot Cabral

## 2020-02-20 NOTE — REP
Clinical:  Acute renal insufficiency.

 

Technique: Axial noncontrast images from the lung bases to the pubic symphysis

with coronal and sagittal re-formations.

 

Comparison:  01/24/2020.

 

Findings:

Lung bases are clear.  There is a small/moderate pericardial effusion unchanged

from prior examination.

 

Liver, spleen, pancreas, gallbladder, bilateral adrenal glands and kidneys are

normal for noncontrast evaluation.  Incidental 1.5 cm hypodensity along the

lateral aspect of the right kidney likely represents cyst and is stable in

appearance.  No perinephric stranding or hydroureteronephrosis noted.

 

The enteric system demonstrates dense contrast material within the colon

consistent with history of recent barium procedure.  There is no evidence for

bowel obstruction or acute inflammatory process.  Colonic diverticulosis noted

without obvious diverticulitis.

 

Pelvis demonstrates normal bladder and age-appropriate uterus/ adnexa.  Small fat

containing inguinal hernias noted.  No ascites.  No free air.  No obvious

adenopathy.  Abdominal aorta demonstrates atherosclerotic changes without

aneurysm.  Musculoskeletal structures demonstrate age-related degenerative

change.

 

Impression:

1.  Small/moderate stable pericardial effusion.  No cardiomegaly.

2.  Stable 1.5 cm right renal cyst.  No further urinary tract pathology

appreciated.

3.  Scattered colonic diverticula without acute diverticulitis.

4.  No ascites, focal inflammatory stranding, or adenopathy.

 

 

Electronically Signed by

Houston Reid MD 02/20/2020 01:29 P

## 2020-02-20 NOTE — HPEPDOC
General


Date of Admission


02/20/20


Date of Service:  Feb 20, 2020


Chief Complaint


The patient is a 54-year-old female admitted with a reason for visit of Low 

Blood Pressure.





History of Present Illness


54 year old female presents from physical therapy for low blood pressure. 

Daughter at bedside assisting with history. States noted blood pressure readings

on the lower side during last PT session. Went to see her PCP today, was told to

stop taking torsemide. Patient went to her scheduled PT session and noted BP 

readings in the low 80's systolic. Sent to the ED for evaluation. Patient states

she's been spending more time in bed lately due to fatigue but otherwise no 

specific complaints. Denies cp/pressure, n/v/d, abdominal pain, shortness of 

breath, urinary complaints. BP 82 systolic in ED, tx with IVF, labs with SCr 

2.27 from normal baseline.





Home Medications


Scheduled


Aspirin (Aspirin) 81 Mg Tab.chew, 81 MG PO DAILY, (Reported)


Atorvastatin Calcium (Atorvastatin Calcium) 40 Mg Tablet, 40 MG PO DAILY, 

(Reported)


Bupropion Hcl (Bupropion Xl) 150 Mg Tab, 150 MG PO DAILY, (Reported)


Carvedilol (Carvedilol) 6.25 Mg Tablet, 6.25 MG PO BID, (Reported)


Cholecalciferol (Vitamin D3) (Vitamin D3) 1,250 Mcg Capsule, 1,250 MCG PO QWEEK,

(Reported)


   takes on monday 


Clopidogrel Bisulfate (Clopidogrel) 75 Mg Tablet, 75 MG PO DAILY, (Reported)


Fluoxetine Hcl (Fluoxetine HCl) 20 Mg Capsule, 20 MG PO QHS, (Reported)


Ketoconazole (Ketoconazole) 120 Ml Shampoo, 1 DOSE TOP 2XW, (Reported)


   apply to scalp, does not use on specific days 


Lisinopril (Lisinopril) 10 Mg Tablet, 10 MG PO DAILY, (Reported)


Pantoprazole Sodium (Pantoprazole Sodium) 40 Mg Tablet.dr, 40 MG PO DAILY, 

(Reported)


Tolterodine Tartrate (Tolterodine Tartrate ER) 4 Mg Cap.er.24h, 4 MG PO DAILY, 

(Reported)


Torsemide (Torsemide) 20 Mg Tablet, 20 MG PO DAILY, (Reported)


Trazodone HCl (Trazodone HCl) 100 Mg Tablet, 100 MG PO QHS, (Reported)





Scheduled PRN


Albuterol Sulfate (Albuterol Sulfate Hfa) 8.5 Gm Hfa.aer.ad, 2 PUFFS INH QID PRN

for SHORTNESS OF BREATH, (Reported)


Fluticasone Furoate (Arnuity Ellipta) 100 Mcg Blst.w.dev, 1 PUFF INH DAILY PRN 

for SHORTNESS OF BREATH, (Reported)


   patient states prn 


Nitroglycerin (Nitroglycerin) 0.4 Mg Tab.subl, 0.4 MG SL NITRO PRN for CHEST 

PAIN, (Reported)


Triamcinolone Acetonide (Triamcinolone Acetonide) 15 Gm Oint...g., 1 DOSE TOP 

BID PRN for RASH, (Reported)


   apply to hands and arms 





Allergies


Coded Allergies:  


     No Known Allergies (Verified , 8/26/04)





Past Medical History


Medical History


HTN, obesity, CVA


Surgical History


as above





A-FIB/CHADSVASC


A-FIB History


Current/History of A-Fib/PAF?:  No


Current PO Anticoag Therapy:  No





Review of Systems


Constitutional:  Reports: Fatigue


Eyes:  Denies: Pain, Vision change


ENT:  Denies: Head Aches, Ear Pain, Dysphagia


Skin:  Denies: Rash, Lesions, Breakdown


Pulmonary:  Denies: Dyspnea, Cough


Cardiovascular:  Denies: Chest Pain, Palpitations, Orthopnea, Paroxysmal Noc. 

Dyspnea, Lt Headedness


Gastrointestinal:  Denies: Nausea, Vomiting, Abdominal Pain, Diarrhea


Genitourinary:  Denies: Dysuria, Frequency, Incontinence, Retention


Hematologic:  Denies: Bruising, Bleeding Excessively


Musculoskeletal:  Denies: Neck Pain, Back Pain, Joint Pain, Muscle Pain, Spasms


Neurological:  Denies: Weakness, Numbness, Change in speech, Confusion


Psych:  Reports: Mood Normal; 


   Denies: Depression, Memory Issues





Physical Examination


General Exam:  Positive: Alert, No Acute Distress


Eye Exam:  Positive: PERRLA, Conjunctiva & lids normal, EOMI; 


   Negative: Sclera icteric


ENT Exam:  Positive: Atraumatic, Mucous membr. moist/pink, Pharynx Normal


Neck Exam:  Positive: Supple; 


   Negative: JVD, thyromegaly


Chest Exam:  Positive: Clear to auscultation, Normal air movement


Heart Exam:  Positive: Rate Normal, Regular Rhythm, Normal S1, Normal S2; 


   Negative: Murmurs, Rubs


Telemetry:  Positive: No significant arrhythmia


Abdomen Exam:  Positive: Normal bowel sounds, Soft; 


   Negative: Tenderness, Hepatospenomegaly


Extremity Exam:  Positive: Normal pulses; 


   Negative: Clubbing, Cyanosis, Edema


Skin Exam:  Positive: Nl turgor and temperature; 


   Negative: Breakdown, Lesion


Neuro Exam:  Positive: Normal Gait, Normal Speech, Cranial Nerves 3-12 NL, 

Reflexes 2+


Psych Exam:  Positive: Mental status NL, Mood NL, Oriented x 3





Vital Signs





Vital Signs








  Date Time  Temp Pulse Resp B/P (MAP) Pulse Ox O2 Delivery O2 Flow Rate FiO2


 


2/20/20 12:31  51  92/50 (64) 93   


 


2/20/20 10:38 97.1       


 


2/20/20 10:14   16   Room Air  











Laboratory Data


Labs 24H


Laboratory Tests 2


2/20/20 11:13: 


Immature Granulocyte % (Auto) 0.4, Neutrophils (%) (Auto) 71.8H, Lymphocytes (%)

(Auto) 18.7L, Monocytes (%) (Auto) 4.8, Eosinophils (%) (Auto) 3.3H, Basophils 

(%) (Auto) 1.0, Neutrophils # (Auto) 6.5, Lymphocytes # (Auto) 1.7, Monocytes # 

(Auto) 0.4, Eosinophils # (Auto) 0.3, Basophils # (Auto) 0.1, Nucleated Red 

Blood Cells % (auto) 0.0, Erythrocyte Sedimentation Rate 43H, Anion Gap 3L, 

Glomerular Filtration Rate 23.8L, Osmolality 306H, Lactic Acid Level 1.8, Uric 

Acid 14.2H, Calcium Level 9.6, Magnesium Level 2.2, Total Bilirubin 0.2, 

Aspartate Amino Transf (AST/SGOT) 11, Alanine Aminotransferase (ALT/SGPT) 12, 

Alkaline Phosphatase 62, Total Creatine Kinase 39, Total Protein 7.3, Albumin 

3.4, Albumin/Globulin Ratio 0.87L


CBC/BMP


Laboratory Tests


2/20/20 11:13








Microbiology





Microbiology


2/20/20 Blood Culture, Received


          Pending


2/20/20 Blood Culture, Received


          Pending





 Assessment/Plan


1. hypotension


- admit to medical floor.


- continue IVF NS, gentle.


- monitor respiratory status on fluids with cardiac history.


- monitor BP for improvement.


- hold anti-hypertensives, diuretics. 





2. MALCOLM


- IVF NS, repeat SCr.


- should note improvement with fluids, patient notes decreased oral intake for 

past 2 weeks.





3. HTN


- hold anti-hypertensives and diuretics. 





4. HLD


- continue atorvastatin.





5. recent CVA


- continue asa, plavix.





Plan / VTE


VTE Prophylaxis Ordered?:  Yes











JESU DIXON MD              Feb 20, 2020 14:02

## 2020-02-21 VITALS — DIASTOLIC BLOOD PRESSURE: 61 MMHG | SYSTOLIC BLOOD PRESSURE: 105 MMHG

## 2020-02-21 VITALS — SYSTOLIC BLOOD PRESSURE: 128 MMHG | DIASTOLIC BLOOD PRESSURE: 68 MMHG

## 2020-02-21 VITALS — SYSTOLIC BLOOD PRESSURE: 102 MMHG | DIASTOLIC BLOOD PRESSURE: 56 MMHG

## 2020-02-21 VITALS — SYSTOLIC BLOOD PRESSURE: 78 MMHG | DIASTOLIC BLOOD PRESSURE: 48 MMHG

## 2020-02-21 VITALS — SYSTOLIC BLOOD PRESSURE: 122 MMHG | DIASTOLIC BLOOD PRESSURE: 70 MMHG

## 2020-02-21 VITALS — DIASTOLIC BLOOD PRESSURE: 50 MMHG | SYSTOLIC BLOOD PRESSURE: 98 MMHG

## 2020-02-21 LAB
ALBUMIN SERPL BCG-MCNC: 2.7 GM/DL (ref 3.2–5.2)
ALT SERPL W P-5'-P-CCNC: 8 U/L (ref 12–78)
BILIRUB SERPL-MCNC: 0.3 MG/DL (ref 0.2–1)
BUN SERPL-MCNC: 46 MG/DL (ref 7–18)
CALCIUM SERPL-MCNC: 8.6 MG/DL (ref 8.5–10.1)
CHLORIDE SERPL-SCNC: 107 MEQ/L (ref 98–107)
CO2 SERPL-SCNC: 28 MEQ/L (ref 21–32)
CREAT SERPL-MCNC: 1.4 MG/DL (ref 0.55–1.3)
GFR SERPL CREATININE-BSD FRML MDRD: 41.6 ML/MIN/{1.73_M2} (ref 51–?)
GLUCOSE SERPL-MCNC: 88 MG/DL (ref 70–100)
POTASSIUM SERPL-SCNC: 3.9 MEQ/L (ref 3.5–5.1)
PROT SERPL-MCNC: 5.7 GM/DL (ref 6.4–8.2)
SODIUM SERPL-SCNC: 138 MEQ/L (ref 136–145)

## 2020-02-21 RX ADMIN — INSULIN LISPRO SCH UNITS: 100 INJECTION, SOLUTION INTRAVENOUS; SUBCUTANEOUS at 11:54

## 2020-02-21 RX ADMIN — PANTOPRAZOLE SODIUM SCH MG: 40 TABLET, DELAYED RELEASE ORAL at 08:57

## 2020-02-21 RX ADMIN — SODIUM CHLORIDE SCH MLS/HR: 9 INJECTION, SOLUTION INTRAVENOUS at 08:57

## 2020-02-21 RX ADMIN — ATORVASTATIN CALCIUM SCH MG: 20 TABLET, FILM COATED ORAL at 08:57

## 2020-02-21 RX ADMIN — BUPROPION HYDROCHLORIDE SCH MG: 150 TABLET, FILM COATED, EXTENDED RELEASE ORAL at 08:57

## 2020-02-21 RX ADMIN — INSULIN LISPRO SCH UNITS: 100 INJECTION, SOLUTION INTRAVENOUS; SUBCUTANEOUS at 20:46

## 2020-02-21 RX ADMIN — INSULIN LISPRO SCH UNITS: 100 INJECTION, SOLUTION INTRAVENOUS; SUBCUTANEOUS at 07:30

## 2020-02-21 RX ADMIN — SODIUM CHLORIDE SCH MLS/HR: 9 INJECTION, SOLUTION INTRAVENOUS at 10:23

## 2020-02-21 RX ADMIN — ASPIRIN 81 MG CHEWABLE TABLET SCH MG: 81 TABLET CHEWABLE at 08:57

## 2020-02-21 RX ADMIN — SODIUM CHLORIDE SCH MLS/HR: 9 INJECTION, SOLUTION INTRAVENOUS at 20:45

## 2020-02-21 RX ADMIN — NYSTATIN SCH DOSE: 100000 POWDER TOPICAL at 20:45

## 2020-02-21 RX ADMIN — NYSTATIN SCH DOSE: 100000 POWDER TOPICAL at 08:57

## 2020-02-21 RX ADMIN — TOLTERODINE SCH MG: 2 CAPSULE, EXTENDED RELEASE ORAL at 08:57

## 2020-02-21 RX ADMIN — INSULIN LISPRO SCH UNITS: 100 INJECTION, SOLUTION INTRAVENOUS; SUBCUTANEOUS at 17:30

## 2020-02-21 RX ADMIN — CLOPIDOGREL BISULFATE SCH MG: 75 TABLET ORAL at 08:57

## 2020-02-21 NOTE — IPNPDOC
Subjective


Date Seen


The patient was seen on 2/21/20.





Subjective


Chief Complaint/HPI


seen and examined at bedside, feels slightly better today with increasing 

energy.


General:  Reports: Normal Appetite; 


   Denies: Chills, Night Sweats, Fatigue, Malaise


Constitutional:  Denies: Chills, Fever, Night Sweats


Eyes:  Denies: Pain, Vision change


ENT:  Denies: Head Aches, Ear Pain, Dysphagia


Skin:  Denies: Rash, Lesions, Breakdown


Pulmonary:  Denies: Dyspnea, Cough


Cardiovascular:  Denies: Chest Pain, Palpitations, Orthopnea, Paroxysmal Noc. 

Dyspnea, Lt Headedness


Gastrointestinal:  Denies: Nausea, Vomiting, Abdominal Pain, Diarrhea, 

Constipation


Genitourinary:  Denies: Dysuria, Frequency, Incontinence, Retention


Hematologic:  Denies: Bruising, Bleeding Excessively


Musculoskeletal:  Denies: Neck Pain, Back Pain, Joint Pain, Muscle Pain, Spasms


Neurological:  Denies: Weakness, Numbness, Change in speech, Confusion


Psych:  Reports: Mood Normal; 


   Denies: Depression, Memory Issues





Objective


Physical Examination


General Exam:  Positive: Alert, No Acute Distress


Eye Exam:  Positive: PERRLA, Conjunctiva & lids normal, EOMI; 


   Negative: Sclera icteric


ENT Exam:  Positive: Atraumatic, Mucous membr. moist/pink, Pharynx Normal


Neck Exam:  Positive: Supple; 


   Negative: JVD, thyromegaly


Chest Exam:  Positive: Clear to auscultation, Normal air movement


Heart Exam:  Positive: Rate Normal, Regular Rhythm, Normal S1, Normal S2; 


   Negative: Murmurs, Rubs


Telemetry:  Positive: No significant arrhythmia


Abdomen Exam:  Positive: Normal bowel sounds, Soft; 


   Negative: Tenderness, Hepatospenomegaly


Female  Exam:  Positive: Nl Ext Genitalia; 


   Negative: Lesions, Discharge, Odor, Tenderness


Extremity Exam:  Positive: Normal pulses; 


   Negative: Clubbing, Cyanosis, Edema


Skin Exam:  Positive: Nl turgor and temperature; 


   Negative: Breakdown, Lesion


Neuro Exam:  Positive: Normal Gait, Normal Speech, Cranial Nerves 3-12 NL, 

Reflexes 2+


Psych Exam:  Positive: Mental status NL, Mood NL, Oriented x 3





Assessment /Plan


Assessment


1. hypotension


- slow/steady improvement in BP, on IVF NS, continue same. 


- monitor respiratory status on fluids with cardiac history.


- hold anti-hypertensives, diuretics. 





2. MALCOLM


- IVF NS, SCr improvement today.


- should note improvement with fluids, patient notes decreased oral intake for 

past 2 weeks.





3. HTN


- hold anti-hypertensives and diuretics. 





4. HLD


- continue atorvastatin.





5. recent CVA


- continue asa, plavix.





Plan/VTE


VTE Prophylaxis Ordered?:  Yes





VS, I&O, 24H, Fishbone


Vital Signs/I&O





Vital Signs








  Date Time  Temp Pulse Resp B/P (MAP) Pulse Ox O2 Delivery O2 Flow Rate FiO2


 


2/21/20 06:00 97.3 57 18 102/56 (71) 94 Room Air  














I&O- Last 24 Hours up to 6 AM 


 


 2/21/20





 05:59


 


Intake Total 5375 ml


 


Output Total 0 ml


 


Balance 5375 ml











Laboratory Data


24H LABS


Laboratory Tests 2


2/20/20 11:13: 


Immature Granulocyte % (Auto) 0.4, Neutrophils (%) (Auto) 71.8H, Lymphocytes (%)

(Auto) 18.7L, Monocytes (%) (Auto) 4.8, Eosinophils (%) (Auto) 3.3H, Basophils 

(%) (Auto) 1.0, Neutrophils # (Auto) 6.5, Lymphocytes # (Auto) 1.7, Monocytes # 

(Auto) 0.4, Eosinophils # (Auto) 0.3, Basophils # (Auto) 0.1, Nucleated Red 

Blood Cells % (auto) 0.0, Erythrocyte Sedimentation Rate 43H, Anion Gap 3L, 

Glomerular Filtration Rate 23.8L, Osmolality 306H, Lactic Acid Level 1.8, Uric 

Acid 14.2H, Calcium Level 9.6, Magnesium Level 2.2, Total Bilirubin 0.2, 

Aspartate Amino Transf (AST/SGOT) 11, Alanine Aminotransferase (ALT/SGPT) 12, 

Alkaline Phosphatase 62, Total Creatine Kinase 39, Total Protein 7.3, Albumin 

3.4, Albumin/Globulin Ratio 0.87L


2/20/20 17:32: Bedside Glucose (Misc Panel) 100


2/20/20 20:26: Bedside Glucose (Misc Panel) 143H


2/21/20 06:42: 


Anion Gap 3L, Glomerular Filtration Rate 41.6L, Calcium Level 8.6, Total 

Bilirubin 0.3, Aspartate Amino Transf (AST/SGOT) 7, Alanine Aminotransferase 

(ALT/SGPT) 8L, Alkaline Phosphatase 53, Total Protein 5.7#L, Albumin 2.7#L, 

Albumin/Globulin Ratio 0.90L


CBC/BMP


Laboratory Tests


2/20/20 11:13








2/21/20 06:42








Microbiology





Microbiology


2/20/20 Blood Culture, Received


          Pending


2/20/20 Blood Culture, Received


          Pending











JESU DIXON MD              Feb 21, 2020 10:13

## 2020-02-22 VITALS — SYSTOLIC BLOOD PRESSURE: 119 MMHG | DIASTOLIC BLOOD PRESSURE: 68 MMHG

## 2020-02-22 VITALS — DIASTOLIC BLOOD PRESSURE: 70 MMHG | SYSTOLIC BLOOD PRESSURE: 118 MMHG

## 2020-02-22 VITALS — DIASTOLIC BLOOD PRESSURE: 69 MMHG | SYSTOLIC BLOOD PRESSURE: 110 MMHG

## 2020-02-22 VITALS — SYSTOLIC BLOOD PRESSURE: 119 MMHG | DIASTOLIC BLOOD PRESSURE: 70 MMHG

## 2020-02-22 LAB
BUN SERPL-MCNC: 26 MG/DL (ref 7–18)
CALCIUM SERPL-MCNC: 8.1 MG/DL (ref 8.5–10.1)
CHLORIDE SERPL-SCNC: 112 MEQ/L (ref 98–107)
CO2 SERPL-SCNC: 26 MEQ/L (ref 21–32)
CREAT SERPL-MCNC: 1.12 MG/DL (ref 0.55–1.3)
GFR SERPL CREATININE-BSD FRML MDRD: 53.8 ML/MIN/{1.73_M2} (ref 51–?)
GLUCOSE SERPL-MCNC: 90 MG/DL (ref 70–100)
HCT VFR BLD AUTO: 32.2 % (ref 36–47)
HGB BLD-MCNC: 10.3 G/DL (ref 12–15.5)
MCH RBC QN AUTO: 27.5 PG (ref 27–33)
MCHC RBC AUTO-ENTMCNC: 32 G/DL (ref 32–36.5)
MCV RBC AUTO: 86.1 FL (ref 80–96)
PLATELET # BLD AUTO: 266 10^3/UL (ref 150–450)
POTASSIUM SERPL-SCNC: 4.1 MEQ/L (ref 3.5–5.1)
RBC # BLD AUTO: 3.74 10^6/UL (ref 4–5.4)
SODIUM SERPL-SCNC: 142 MEQ/L (ref 136–145)
WBC # BLD AUTO: 6.9 10^3/UL (ref 4–10)

## 2020-02-22 RX ADMIN — INSULIN LISPRO SCH UNITS: 100 INJECTION, SOLUTION INTRAVENOUS; SUBCUTANEOUS at 07:30

## 2020-02-22 RX ADMIN — NYSTATIN SCH DOSE: 100000 POWDER TOPICAL at 08:37

## 2020-02-22 RX ADMIN — ATORVASTATIN CALCIUM SCH MG: 20 TABLET, FILM COATED ORAL at 08:38

## 2020-02-22 RX ADMIN — ASPIRIN 81 MG CHEWABLE TABLET SCH MG: 81 TABLET CHEWABLE at 08:38

## 2020-02-22 RX ADMIN — INSULIN LISPRO SCH UNITS: 100 INJECTION, SOLUTION INTRAVENOUS; SUBCUTANEOUS at 12:35

## 2020-02-22 RX ADMIN — BUPROPION HYDROCHLORIDE SCH MG: 150 TABLET, FILM COATED, EXTENDED RELEASE ORAL at 08:37

## 2020-02-22 RX ADMIN — TOLTERODINE SCH MG: 2 CAPSULE, EXTENDED RELEASE ORAL at 08:37

## 2020-02-22 RX ADMIN — INSULIN LISPRO SCH UNITS: 100 INJECTION, SOLUTION INTRAVENOUS; SUBCUTANEOUS at 17:30

## 2020-02-22 RX ADMIN — SODIUM CHLORIDE SCH MLS/HR: 9 INJECTION, SOLUTION INTRAVENOUS at 16:10

## 2020-02-22 RX ADMIN — INSULIN LISPRO SCH UNITS: 100 INJECTION, SOLUTION INTRAVENOUS; SUBCUTANEOUS at 21:00

## 2020-02-22 RX ADMIN — NYSTATIN SCH DOSE: 100000 POWDER TOPICAL at 21:41

## 2020-02-22 RX ADMIN — SODIUM CHLORIDE SCH MLS/HR: 9 INJECTION, SOLUTION INTRAVENOUS at 05:26

## 2020-02-22 RX ADMIN — PANTOPRAZOLE SODIUM SCH MG: 40 TABLET, DELAYED RELEASE ORAL at 08:37

## 2020-02-22 RX ADMIN — CLOPIDOGREL BISULFATE SCH MG: 75 TABLET ORAL at 08:37

## 2020-02-22 NOTE — DS.PDOC
Discharge Summary


General


Date of Admission


Feb 20, 2020 at 10:07


Date of Discharge


02/22/20





Discharge Summary


PROCEDURES PERFORMED DURING STAY: [None].





ADMITTING DIAGNOSES: 


1. MALCOLM


2. hypotension





DISCHARGE DIAGNOSES:


1. MALCOLM


2. hypotension





COMPLICATIONS/CHIEF COMPLAINT: MALCOLM.





HISTORY OF PRESENT ILLNESS: Please refer to HP for detailed HPI. 





HOSPITAL COURSE: Patient was admitted and treated for the following conditions:





1. hypotension


- resolved.


- slow/steady improvement in BP on IVF NS, 110-120's systolic.


- patient stable for discharge, advised to hold BP meds until seen by PCP.





2. MALCOLM


- secondary to decreased PO intake.


- SCr returned to baseline after IVF.





3. HTN


- patient advised to hold BP meds until evaluated by PCP.





4. HLD


- continue atorvastatin.





5. recent CVA


- continue asa, plavix.





DISCHARGE MEDICATIONS: Please see below.


 


ALLERGIES: Please see below.





PHYSICAL EXAMINATION ON DISCHARGE:


VITAL SIGNS: Please see below.


GENERAL: AAO x 3, NAD.


HEENT: NCAT, anicteric sclera, PERRLA/EOMI


NECK: supple, no JVD, no thyromegaly


CARDIOVASCULAR EXAMINATION: NS1S2, regular, no murmurs/rubs


RESPIRATORY EXAMINATION: CTA b/l, no wheezing, rales, rhonchi.


ABDOMINAL EXAMINATION: NT/ND, positive bowel sounds, no masses


EXTREMITIES: no cyanosis, clubbing, edema


SKIN: warm, no rashes, 


NEUROLOGICAL EXAMINATION: AAO x 3, no motor/sensory deficits, 


PSYCHIATRIC EXAMINATION: calm, cooperative, normal affectis. 





LABORATORY DATA: Please see below.





PROGNOSIS: good





ACTIVITY: [As tolerated].





DIET: low sodium, low fat





DISCHARGE PLAN: d/c home





DISPOSITION: home





DISCHARGE INSTRUCTIONS:


1. Please follow up with PCP in 1 week





ITEMS TO FOLLOWUP ON ON OUTPATIENT:


1. .





DISCHARGE CONDITION: [Stable].





TIME SPENT ON DISCHARGE: Greater than [30] minutes.





Vital Signs/I&Os





Vital Signs








  Date Time  Temp Pulse Resp B/P (MAP) Pulse Ox O2 Delivery O2 Flow Rate FiO2


 


2/22/20 06:00 99.1 58 16 119/70 (86) 97 Room Air  














I&O- Last 24 Hours up to 6 AM 


 


 2/22/20





 06:00


 


Intake Total 3493 ml


 


Output Total 0 ml


 


Balance 3493 ml











Laboratory Data


Labs 24H


Laboratory Tests 2


2/21/20 11:26: Bedside Glucose (Misc Panel) 127H


2/21/20 16:31: Bedside Glucose (Misc Panel) 127H


2/21/20 19:58: Bedside Glucose (Misc Panel) 115H


2/22/20 06:43: 


Nucleated Red Blood Cells % (auto) 0.0, Anion Gap 4L, Glomerular Filtration Rate

 53.8, Calcium Level 8.1L


CBC/BMP


Laboratory Tests


2/22/20 06:43








FSBS





Laboratory Tests








Test


 2/21/20


11:26 2/21/20


16:31 2/21/20


19:58 Range/Units


 


 


Bedside Glucose (Misc Panel) 127 127 115   MG/DL











Microbiology





Microbiology


2/20/20 Blood Culture - Preliminary, Resulted


          No growth after 24 hours . All specim...


2/20/20 Blood Culture - Preliminary, Resulted


          No growth after 24 hours . All specim...





Discharge Medications


Scheduled


Aspirin (Aspirin) 81 Mg Tab.chew, 81 MG PO DAILY, (Reported)


Atorvastatin Calcium (Atorvastatin Calcium) 40 Mg Tablet, 40 MG PO DAILY, (Re

ported)


Bupropion Hcl (Bupropion Xl) 150 Mg Tab, 150 MG PO DAILY, (Reported)


Cholecalciferol (Vitamin D3) (Vitamin D3) 1,250 Mcg Capsule, 1,250 MCG PO QWEEK,

 (Reported)


   takes on monday 


Clopidogrel Bisulfate (Clopidogrel) 75 Mg Tablet, 75 MG PO DAILY, (Reported)


Fluoxetine Hcl (Fluoxetine HCl) 20 Mg Capsule, 20 MG PO QHS, (Reported)


Ketoconazole (Ketoconazole) 120 Ml Shampoo, 1 DOSE TOP 2XW, (Reported)


   apply to scalp, does not use on specific days 


Pantoprazole Sodium (Pantoprazole Sodium) 40 Mg Tablet.dr, 40 MG PO DAILY, 

(Reported)


Tolterodine Tartrate (Tolterodine Tartrate ER) 4 Mg Cap.er.24h, 4 MG PO DAILY, 

(Reported)


Torsemide (Torsemide) 20 Mg Tablet, 20 MG PO DAILY, (Reported)


Trazodone HCl (Trazodone HCl) 100 Mg Tablet, 100 MG PO QHS, (Reported)





Scheduled PRN


Albuterol Sulfate (Albuterol Sulfate Hfa) 8.5 Gm Hfa.aer.ad, 2 PUFFS INH QID PRN

 for SHORTNESS OF BREATH, (Reported)


Fluticasone Furoate (Arnuity Ellipta) 100 Mcg Blst.w.dev, 1 PUFF INH DAILY PRN 

for SHORTNESS OF BREATH, (Reported)


   patient states prn 


Nitroglycerin (Nitroglycerin) 0.4 Mg Tab.subl, 0.4 MG SL NITRO PRN for CHEST 

PAIN, (Reported)


Triamcinolone Acetonide (Triamcinolone Acetonide) 15 Gm Oint...g., 1 DOSE TOP 

BID PRN for RASH, (Reported)


   apply to hands and arms 





Allergies


Coded Allergies:  


     No Known Allergies (Verified , 8/26/04)











JESU DIXON MD              Feb 22, 2020 11:19

## 2020-02-23 VITALS — SYSTOLIC BLOOD PRESSURE: 115 MMHG | DIASTOLIC BLOOD PRESSURE: 61 MMHG

## 2020-02-23 VITALS — SYSTOLIC BLOOD PRESSURE: 124 MMHG | DIASTOLIC BLOOD PRESSURE: 64 MMHG

## 2020-02-23 VITALS — DIASTOLIC BLOOD PRESSURE: 64 MMHG | SYSTOLIC BLOOD PRESSURE: 124 MMHG

## 2020-02-23 RX ADMIN — PANTOPRAZOLE SODIUM SCH MG: 40 TABLET, DELAYED RELEASE ORAL at 07:52

## 2020-02-23 RX ADMIN — NYSTATIN SCH DOSE: 100000 POWDER TOPICAL at 20:17

## 2020-02-23 RX ADMIN — CLOPIDOGREL BISULFATE SCH MG: 75 TABLET ORAL at 07:52

## 2020-02-23 RX ADMIN — ATORVASTATIN CALCIUM SCH MG: 20 TABLET, FILM COATED ORAL at 07:51

## 2020-02-23 RX ADMIN — NYSTATIN SCH DOSE: 100000 POWDER TOPICAL at 07:52

## 2020-02-23 RX ADMIN — BUPROPION HYDROCHLORIDE SCH MG: 150 TABLET, FILM COATED, EXTENDED RELEASE ORAL at 07:52

## 2020-02-23 RX ADMIN — INSULIN LISPRO SCH UNITS: 100 INJECTION, SOLUTION INTRAVENOUS; SUBCUTANEOUS at 21:00

## 2020-02-23 RX ADMIN — ASPIRIN 81 MG CHEWABLE TABLET SCH MG: 81 TABLET CHEWABLE at 07:51

## 2020-02-23 RX ADMIN — INSULIN LISPRO SCH UNITS: 100 INJECTION, SOLUTION INTRAVENOUS; SUBCUTANEOUS at 12:00

## 2020-02-23 RX ADMIN — TOLTERODINE SCH MG: 2 CAPSULE, EXTENDED RELEASE ORAL at 07:51

## 2020-02-23 RX ADMIN — INSULIN LISPRO SCH UNITS: 100 INJECTION, SOLUTION INTRAVENOUS; SUBCUTANEOUS at 07:53

## 2020-02-23 RX ADMIN — INSULIN LISPRO SCH UNITS: 100 INJECTION, SOLUTION INTRAVENOUS; SUBCUTANEOUS at 17:41

## 2020-02-24 VITALS — SYSTOLIC BLOOD PRESSURE: 125 MMHG | DIASTOLIC BLOOD PRESSURE: 64 MMHG

## 2020-02-24 RX ADMIN — INSULIN LISPRO SCH UNITS: 100 INJECTION, SOLUTION INTRAVENOUS; SUBCUTANEOUS at 12:00

## 2020-02-24 RX ADMIN — PANTOPRAZOLE SODIUM SCH MG: 40 TABLET, DELAYED RELEASE ORAL at 08:58

## 2020-02-24 RX ADMIN — ATORVASTATIN CALCIUM SCH MG: 20 TABLET, FILM COATED ORAL at 08:58

## 2020-02-24 RX ADMIN — CLOPIDOGREL BISULFATE SCH MG: 75 TABLET ORAL at 08:58

## 2020-02-24 RX ADMIN — BUPROPION HYDROCHLORIDE SCH MG: 150 TABLET, FILM COATED, EXTENDED RELEASE ORAL at 08:58

## 2020-02-24 RX ADMIN — ASPIRIN 81 MG CHEWABLE TABLET SCH MG: 81 TABLET CHEWABLE at 08:58

## 2020-02-24 RX ADMIN — TOLTERODINE SCH MG: 2 CAPSULE, EXTENDED RELEASE ORAL at 08:58

## 2020-02-24 RX ADMIN — NYSTATIN SCH DOSE: 100000 POWDER TOPICAL at 08:59

## 2020-02-24 RX ADMIN — INSULIN LISPRO SCH UNITS: 100 INJECTION, SOLUTION INTRAVENOUS; SUBCUTANEOUS at 07:19

## 2020-02-28 ENCOUNTER — HOSPITAL ENCOUNTER (OUTPATIENT)
Dept: HOSPITAL 53 - M OT | Age: 55
LOS: 1 days | Discharge: HOME | End: 2020-02-29
Attending: FAMILY MEDICINE
Payer: COMMERCIAL

## 2020-02-28 DIAGNOSIS — Z47.89: ICD-10-CM

## 2020-02-28 DIAGNOSIS — G81.94: Primary | ICD-10-CM

## 2020-03-09 ENCOUNTER — HOSPITAL ENCOUNTER (OUTPATIENT)
Dept: HOSPITAL 53 - M RAD | Age: 55
End: 2020-03-09
Attending: FAMILY MEDICINE
Payer: COMMERCIAL

## 2020-03-09 DIAGNOSIS — R59.9: Primary | ICD-10-CM

## 2020-03-09 PROCEDURE — 70491 CT SOFT TISSUE NECK W/DYE: CPT

## 2020-03-18 ENCOUNTER — HOSPITAL ENCOUNTER (OUTPATIENT)
Dept: HOSPITAL 53 - M IRPRO | Age: 55
End: 2020-03-18
Attending: INTERNAL MEDICINE
Payer: COMMERCIAL

## 2020-03-18 VITALS — SYSTOLIC BLOOD PRESSURE: 146 MMHG | DIASTOLIC BLOOD PRESSURE: 78 MMHG

## 2020-03-18 DIAGNOSIS — E04.2: Primary | ICD-10-CM

## 2020-03-18 NOTE — REP
ULTRASOUND-GUIDED RIGHT THYROID BIOPSY

 

The procedure was performed under the direct supervision of Dr. Black.

 

The risks and benefits of the procedure were explained to the patient and

informed consent was obtained.

 

The right thyroid nodule was localized using ultrasound guidance.  The skin was

prepped and draped in a sterile fashion.  1% lidocaine was used as a local

anesthetic.  Using ultrasound guidance for fine-needle aspirations were obtained

using 25 gauge needles.

 

The patient tolerated the procedure well and there were no immediate

complications.  After the appropriate amount of monitored convalescence the

patient was discharged from the department.

 

 

Electronically Signed by

ANI Rose 03/18/2020 04:37 P

Electronically Signed by

Kahlil Black MD 03/18/2020 04:49 P

## 2020-03-19 ENCOUNTER — HOSPITAL ENCOUNTER (OUTPATIENT)
Dept: HOSPITAL 53 - M PT | Age: 55
LOS: 12 days | End: 2020-03-31
Attending: FAMILY MEDICINE
Payer: COMMERCIAL

## 2020-03-19 DIAGNOSIS — G81.94: Primary | ICD-10-CM

## 2020-04-21 ENCOUNTER — HOSPITAL ENCOUNTER (OUTPATIENT)
Dept: HOSPITAL 53 - M PT | Age: 55
LOS: 9 days | End: 2020-04-30
Attending: FAMILY MEDICINE
Payer: COMMERCIAL

## 2020-04-21 DIAGNOSIS — G81.94: Primary | ICD-10-CM

## 2020-11-24 ENCOUNTER — HOSPITAL ENCOUNTER (OUTPATIENT)
Dept: HOSPITAL 53 - M ST | Age: 55
LOS: 6 days | End: 2020-11-30
Attending: INTERNAL MEDICINE
Payer: COMMERCIAL

## 2020-11-24 DIAGNOSIS — I63.9: Primary | ICD-10-CM
